# Patient Record
Sex: MALE | Race: WHITE | NOT HISPANIC OR LATINO
[De-identification: names, ages, dates, MRNs, and addresses within clinical notes are randomized per-mention and may not be internally consistent; named-entity substitution may affect disease eponyms.]

---

## 2017-01-05 ENCOUNTER — RX RENEWAL (OUTPATIENT)
Age: 82
End: 2017-01-05

## 2017-02-02 ENCOUNTER — APPOINTMENT (OUTPATIENT)
Dept: CARDIOLOGY | Facility: CLINIC | Age: 82
End: 2017-02-02

## 2017-02-02 ENCOUNTER — NON-APPOINTMENT (OUTPATIENT)
Age: 82
End: 2017-02-02

## 2017-02-02 VITALS — DIASTOLIC BLOOD PRESSURE: 84 MMHG | SYSTOLIC BLOOD PRESSURE: 170 MMHG

## 2017-02-02 VITALS
SYSTOLIC BLOOD PRESSURE: 190 MMHG | DIASTOLIC BLOOD PRESSURE: 80 MMHG | HEART RATE: 55 BPM | WEIGHT: 148 LBS | OXYGEN SATURATION: 95 % | BODY MASS INDEX: 27.23 KG/M2 | HEIGHT: 62 IN

## 2017-02-02 VITALS — SYSTOLIC BLOOD PRESSURE: 180 MMHG | DIASTOLIC BLOOD PRESSURE: 86 MMHG

## 2017-02-02 DIAGNOSIS — R01.1 CARDIAC MURMUR, UNSPECIFIED: ICD-10-CM

## 2017-02-02 DIAGNOSIS — I10 ESSENTIAL (PRIMARY) HYPERTENSION: ICD-10-CM

## 2017-02-02 DIAGNOSIS — E78.5 HYPERLIPIDEMIA, UNSPECIFIED: ICD-10-CM

## 2017-02-02 DIAGNOSIS — I35.0 NONRHEUMATIC AORTIC (VALVE) STENOSIS: ICD-10-CM

## 2017-02-02 DIAGNOSIS — I50.32 CHRONIC DIASTOLIC (CONGESTIVE) HEART FAILURE: ICD-10-CM

## 2017-02-02 DIAGNOSIS — H91.93 UNSPECIFIED HEARING LOSS, BILATERAL: ICD-10-CM

## 2017-02-03 LAB
25(OH)D3 SERPL-MCNC: 20 NG/ML
ALBUMIN SERPL ELPH-MCNC: 4.4 G/DL
ALP BLD-CCNC: 76 U/L
ALT SERPL-CCNC: 13 U/L
ANION GAP SERPL CALC-SCNC: 17 MMOL/L
AST SERPL-CCNC: 21 U/L
BASOPHILS # BLD AUTO: 0.04 K/UL
BASOPHILS NFR BLD AUTO: 0.5 %
BILIRUB SERPL-MCNC: 0.5 MG/DL
BUN SERPL-MCNC: 36 MG/DL
CALCIUM SERPL-MCNC: 10 MG/DL
CHLORIDE SERPL-SCNC: 106 MMOL/L
CHOLEST SERPL-MCNC: 309 MG/DL
CHOLEST/HDLC SERPL: 7.2 RATIO
CO2 SERPL-SCNC: 21 MMOL/L
CREAT SERPL-MCNC: 1.3 MG/DL
CRP SERPL HS-MCNC: 1.2 MG/L
EOSINOPHIL # BLD AUTO: 0.3 K/UL
EOSINOPHIL NFR BLD AUTO: 4 %
GLUCOSE SERPL-MCNC: 130 MG/DL
HBA1C MFR BLD HPLC: 6.6 %
HCT VFR BLD CALC: 43.2 %
HDLC SERPL-MCNC: 43 MG/DL
HGB BLD-MCNC: 13.4 G/DL
IMM GRANULOCYTES NFR BLD AUTO: 0.1 %
LDLC SERPL CALC-MCNC: 210 MG/DL
LYMPHOCYTES # BLD AUTO: 1.64 K/UL
LYMPHOCYTES NFR BLD AUTO: 21.9 %
MAGNESIUM SERPL-MCNC: 2.3 MG/DL
MAN DIFF?: NORMAL
MCHC RBC-ENTMCNC: 28 PG
MCHC RBC-ENTMCNC: 31 GM/DL
MCV RBC AUTO: 90.4 FL
MONOCYTES # BLD AUTO: 0.68 K/UL
MONOCYTES NFR BLD AUTO: 9.1 %
NEUTROPHILS # BLD AUTO: 4.83 K/UL
NEUTROPHILS NFR BLD AUTO: 64.4 %
PLATELET # BLD AUTO: 331 K/UL
POTASSIUM SERPL-SCNC: 4.9 MMOL/L
PROT SERPL-MCNC: 6.8 G/DL
RBC # BLD: 4.78 M/UL
RBC # FLD: 15.9 %
SODIUM SERPL-SCNC: 144 MMOL/L
T3FREE SERPL-MCNC: 2.22 PG/ML
T4 FREE SERPL-MCNC: 1.2 NG/DL
TRIGL SERPL-MCNC: 280 MG/DL
TSH SERPL-ACNC: 1.57 UIU/ML
WBC # FLD AUTO: 7.5 K/UL

## 2017-02-08 ENCOUNTER — APPOINTMENT (OUTPATIENT)
Dept: CARDIOLOGY | Facility: CLINIC | Age: 82
End: 2017-02-08

## 2017-02-23 ENCOUNTER — APPOINTMENT (OUTPATIENT)
Dept: CARDIOLOGY | Facility: CLINIC | Age: 82
End: 2017-02-23

## 2017-02-23 VITALS
HEIGHT: 62 IN | DIASTOLIC BLOOD PRESSURE: 80 MMHG | BODY MASS INDEX: 27.6 KG/M2 | SYSTOLIC BLOOD PRESSURE: 140 MMHG | WEIGHT: 150 LBS

## 2017-04-10 ENCOUNTER — INPATIENT (INPATIENT)
Facility: HOSPITAL | Age: 82
LOS: 9 days | Discharge: NURSING, REHAB OR LONG-TERM CARE | End: 2017-04-20
Attending: INTERNAL MEDICINE | Admitting: INTERNAL MEDICINE
Payer: MEDICARE

## 2017-04-10 VITALS — HEIGHT: 61 IN | WEIGHT: 139.99 LBS

## 2017-04-10 LAB
ALBUMIN SERPL ELPH-MCNC: 3.4 G/DL — SIGNIFICANT CHANGE UP (ref 3.3–5)
ALP SERPL-CCNC: 80 U/L — SIGNIFICANT CHANGE UP (ref 40–120)
ALT FLD-CCNC: 21 U/L — SIGNIFICANT CHANGE UP (ref 12–78)
ANION GAP SERPL CALC-SCNC: 13 MMOL/L — SIGNIFICANT CHANGE UP (ref 5–17)
ANISOCYTOSIS BLD QL: SLIGHT — SIGNIFICANT CHANGE UP
AST SERPL-CCNC: 34 U/L — SIGNIFICANT CHANGE UP (ref 15–37)
BILIRUB SERPL-MCNC: 0.5 MG/DL — SIGNIFICANT CHANGE UP (ref 0.2–1.2)
BUN SERPL-MCNC: 46 MG/DL — HIGH (ref 7–23)
CALCIUM SERPL-MCNC: 8.3 MG/DL — LOW (ref 8.5–10.1)
CHLORIDE SERPL-SCNC: 108 MMOL/L — SIGNIFICANT CHANGE UP (ref 96–108)
CO2 SERPL-SCNC: 23 MMOL/L — SIGNIFICANT CHANGE UP (ref 22–31)
CREAT SERPL-MCNC: 1.39 MG/DL — HIGH (ref 0.5–1.3)
EOSINOPHIL NFR BLD AUTO: 1 % — SIGNIFICANT CHANGE UP (ref 0–6)
GLUCOSE SERPL-MCNC: 122 MG/DL — HIGH (ref 70–99)
HCT VFR BLD CALC: 31.9 % — LOW (ref 39–50)
HGB BLD-MCNC: 10.9 G/DL — LOW (ref 13–17)
LYMPHOCYTES # BLD AUTO: 14 % — SIGNIFICANT CHANGE UP (ref 13–44)
MACROCYTES BLD QL: SLIGHT — SIGNIFICANT CHANGE UP
MCHC RBC-ENTMCNC: 29.8 PG — SIGNIFICANT CHANGE UP (ref 27–34)
MCHC RBC-ENTMCNC: 34.2 GM/DL — SIGNIFICANT CHANGE UP (ref 32–36)
MCV RBC AUTO: 87.1 FL — SIGNIFICANT CHANGE UP (ref 80–100)
MICROCYTES BLD QL: SLIGHT — SIGNIFICANT CHANGE UP
MONOCYTES NFR BLD AUTO: 2 % — SIGNIFICANT CHANGE UP (ref 2–14)
NEUTROPHILS NFR BLD AUTO: 83 % — HIGH (ref 43–77)
OVALOCYTES BLD QL SMEAR: SLIGHT — SIGNIFICANT CHANGE UP
PLAT MORPH BLD: NORMAL — SIGNIFICANT CHANGE UP
PLATELET # BLD AUTO: 237 K/UL — SIGNIFICANT CHANGE UP (ref 150–400)
POTASSIUM SERPL-MCNC: 3.8 MMOL/L — SIGNIFICANT CHANGE UP (ref 3.5–5.3)
POTASSIUM SERPL-SCNC: 3.8 MMOL/L — SIGNIFICANT CHANGE UP (ref 3.5–5.3)
PROT SERPL-MCNC: 6.2 GM/DL — SIGNIFICANT CHANGE UP (ref 6–8.3)
RBC # BLD: 3.66 M/UL — LOW (ref 4.2–5.8)
RBC # FLD: 14.2 % — SIGNIFICANT CHANGE UP (ref 11–15)
RBC BLD AUTO: SIGNIFICANT CHANGE UP
SODIUM SERPL-SCNC: 144 MMOL/L — SIGNIFICANT CHANGE UP (ref 135–145)
TROPONIN I SERPL-MCNC: 0.61 NG/ML — HIGH (ref 0.01–0.04)
WBC # BLD: 6.9 K/UL — SIGNIFICANT CHANGE UP (ref 3.8–10.5)
WBC # FLD AUTO: 6.9 K/UL — SIGNIFICANT CHANGE UP (ref 3.8–10.5)

## 2017-04-10 PROCEDURE — 71010: CPT | Mod: 26

## 2017-04-10 PROCEDURE — 72170 X-RAY EXAM OF PELVIS: CPT | Mod: 26

## 2017-04-10 PROCEDURE — 99285 EMERGENCY DEPT VISIT HI MDM: CPT

## 2017-04-10 RX ORDER — SODIUM CHLORIDE 9 MG/ML
1000 INJECTION INTRAMUSCULAR; INTRAVENOUS; SUBCUTANEOUS ONCE
Qty: 0 | Refills: 0 | Status: COMPLETED | OUTPATIENT
Start: 2017-04-10 | End: 2017-04-10

## 2017-04-10 RX ORDER — HALOPERIDOL DECANOATE 100 MG/ML
5 INJECTION INTRAMUSCULAR ONCE
Qty: 0 | Refills: 0 | Status: COMPLETED | OUTPATIENT
Start: 2017-04-10 | End: 2017-04-10

## 2017-04-10 RX ADMIN — Medication 2 MILLIGRAM(S): at 21:49

## 2017-04-10 RX ADMIN — SODIUM CHLORIDE 250 MILLILITER(S): 9 INJECTION INTRAMUSCULAR; INTRAVENOUS; SUBCUTANEOUS at 22:02

## 2017-04-10 RX ADMIN — HALOPERIDOL DECANOATE 5 MILLIGRAM(S): 100 INJECTION INTRAMUSCULAR at 21:49

## 2017-04-10 NOTE — ED PROVIDER NOTE - PRESENTING SYMPTOMS DETAILS
89M w a hx of dementia, b/l hip surg coems in w worsening agitation. per [place] states past 2-3 days his dementia/confusion has gotten worse to the point he is not making sense and uanble to ambulate well. this evening fell from standing ht in the bathroom, unknown loc but son heard a thump upstairs and saw pt on the floor moving. has mild head contusion, but no active bleeding.   ros - limited due to dementia

## 2017-04-10 NOTE — ED PROVIDER NOTE - PHYSICAL EXAMINATION
GEN: Alert, agitated, no gross signs of pain on examination  HEAD: atraumatic, EOMI, PERRL, normal lids/conjunctiva  ENT: normal hearing, patent oropharynx without erythema/exudate, uvula midline  NECK: +supple, no tenderness/meningismus, +Trachea midline  PULM: Bilateral BS, normal resp effort, no wheeze/stridor/retractions  CV: RRR, no M/R/G, +dist ext pulses  ABD: soft, NT/ND  BACK: no CVAT, no midline tenderness  MSK: no edema/erythema/cyanosis  SKIN: no rash  NEURO: Alert, agitated, no gross motor deficits or cranial nerve deficits

## 2017-04-10 NOTE — ED PROVIDER NOTE - MEDICAL DECISION MAKING DETAILS
pt will be admitted for nstemi, placement, fall risk. cardiology called (dr [facility]), awaiting call back

## 2017-04-10 NOTE — ED ADULT NURSE NOTE - OBJECTIVE STATEMENT
Patient BIBA after Fall. Patient oriented to name only. Mildly combative. Medicated as ordered.  MUNGUIA x 4 without difficultly. No obvious injury. No accompanying family members. Patient on 1:1 for safety and elopement precautions

## 2017-04-10 NOTE — ED PROVIDER NOTE - CARE PLAN
Principal Discharge DX:	NSTEMI (non-ST elevated myocardial infarction)  Secondary Diagnosis:	Agitation

## 2017-04-10 NOTE — ED ADULT NURSE NOTE - CHIEF COMPLAINT QUOTE
fell from standing position , laceration back of his head place one to one with alona for safety, on handcuff, pt very agitated, police removed pt handcuffs

## 2017-04-10 NOTE — ED ADULT TRIAGE NOTE - CHIEF COMPLAINT QUOTE
fell from standing position , laceration back of his head place one to one with alona for safety, on handcuff, pt very agitated fell from standing position , laceration back of his head place one to one with alona for safety, on handcuff, pt very agitated, police removed pt handcuffs

## 2017-04-11 DIAGNOSIS — F03.91 UNSPECIFIED DEMENTIA WITH BEHAVIORAL DISTURBANCE: ICD-10-CM
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
## 2017-04-11 DIAGNOSIS — I21.4 NON-ST ELEVATION (NSTEMI) MYOCARDIAL INFARCTION: ICD-10-CM

## 2017-04-11 LAB
ANION GAP SERPL CALC-SCNC: 12 MMOL/L — SIGNIFICANT CHANGE UP (ref 5–17)
APPEARANCE UR: CLEAR — SIGNIFICANT CHANGE UP
BACTERIA # UR AUTO: ABNORMAL
BASOPHILS # BLD AUTO: 0 K/UL — SIGNIFICANT CHANGE UP (ref 0–0.2)
BASOPHILS NFR BLD AUTO: 0.5 % — SIGNIFICANT CHANGE UP (ref 0–2)
BILIRUB UR-MCNC: NEGATIVE — SIGNIFICANT CHANGE UP
BUN SERPL-MCNC: 39 MG/DL — HIGH (ref 7–23)
CALCIUM SERPL-MCNC: 7.8 MG/DL — LOW (ref 8.5–10.1)
CHLORIDE SERPL-SCNC: 111 MMOL/L — HIGH (ref 96–108)
CO2 SERPL-SCNC: 23 MMOL/L — SIGNIFICANT CHANGE UP (ref 22–31)
COLOR SPEC: YELLOW — SIGNIFICANT CHANGE UP
CREAT SERPL-MCNC: 1.04 MG/DL — SIGNIFICANT CHANGE UP (ref 0.5–1.3)
DIFF PNL FLD: ABNORMAL
EOSINOPHIL # BLD AUTO: 0.1 K/UL — SIGNIFICANT CHANGE UP (ref 0–0.5)
EOSINOPHIL NFR BLD AUTO: 0.9 % — SIGNIFICANT CHANGE UP (ref 0–6)
GLUCOSE SERPL-MCNC: 104 MG/DL — HIGH (ref 70–99)
GLUCOSE UR QL: NEGATIVE MG/DL — SIGNIFICANT CHANGE UP
HCT VFR BLD CALC: 30.2 % — LOW (ref 39–50)
HGB BLD-MCNC: 10.3 G/DL — LOW (ref 13–17)
KETONES UR-MCNC: ABNORMAL
LEUKOCYTE ESTERASE UR-ACNC: NEGATIVE — SIGNIFICANT CHANGE UP
LYMPHOCYTES # BLD AUTO: 1.3 K/UL — SIGNIFICANT CHANGE UP (ref 1–3.3)
LYMPHOCYTES # BLD AUTO: 18.7 % — SIGNIFICANT CHANGE UP (ref 13–44)
MCHC RBC-ENTMCNC: 30.2 PG — SIGNIFICANT CHANGE UP (ref 27–34)
MCHC RBC-ENTMCNC: 34.2 GM/DL — SIGNIFICANT CHANGE UP (ref 32–36)
MCV RBC AUTO: 88.1 FL — SIGNIFICANT CHANGE UP (ref 80–100)
MONOCYTES # BLD AUTO: 0.9 K/UL — SIGNIFICANT CHANGE UP (ref 0–0.9)
MONOCYTES NFR BLD AUTO: 13.2 % — SIGNIFICANT CHANGE UP (ref 2–14)
NEUTROPHILS # BLD AUTO: 4.5 K/UL — SIGNIFICANT CHANGE UP (ref 1.8–7.4)
NEUTROPHILS NFR BLD AUTO: 66.7 % — SIGNIFICANT CHANGE UP (ref 43–77)
NITRITE UR-MCNC: NEGATIVE — SIGNIFICANT CHANGE UP
PH UR: 6 — SIGNIFICANT CHANGE UP (ref 4.8–8)
PLATELET # BLD AUTO: 223 K/UL — SIGNIFICANT CHANGE UP (ref 150–400)
POTASSIUM SERPL-MCNC: 3.7 MMOL/L — SIGNIFICANT CHANGE UP (ref 3.5–5.3)
POTASSIUM SERPL-SCNC: 3.7 MMOL/L — SIGNIFICANT CHANGE UP (ref 3.5–5.3)
PROT UR-MCNC: 15 MG/DL
RBC # BLD: 3.42 M/UL — LOW (ref 4.2–5.8)
RBC # FLD: 14.1 % — SIGNIFICANT CHANGE UP (ref 11–15)
RBC CASTS # UR COMP ASSIST: ABNORMAL /HPF (ref 0–4)
SODIUM SERPL-SCNC: 146 MMOL/L — HIGH (ref 135–145)
SP GR SPEC: 1.01 — SIGNIFICANT CHANGE UP (ref 1.01–1.02)
T3 SERPL-MCNC: 84 NG/DL — SIGNIFICANT CHANGE UP (ref 80–200)
T4 AB SER-ACNC: 8.4 UG/DL — SIGNIFICANT CHANGE UP (ref 4.6–12)
TROPONIN I SERPL-MCNC: 2.1 NG/ML — HIGH (ref 0.01–0.04)
TROPONIN I SERPL-MCNC: 3.17 NG/ML — HIGH (ref 0.01–0.04)
TSH SERPL-MCNC: 0.66 UIU/ML — SIGNIFICANT CHANGE UP (ref 0.36–3.74)
UROBILINOGEN FLD QL: NEGATIVE MG/DL — SIGNIFICANT CHANGE UP
WBC # BLD: 6.8 K/UL — SIGNIFICANT CHANGE UP (ref 3.8–10.5)
WBC # FLD AUTO: 6.8 K/UL — SIGNIFICANT CHANGE UP (ref 3.8–10.5)

## 2017-04-11 PROCEDURE — 70450 CT HEAD/BRAIN W/O DYE: CPT | Mod: 26

## 2017-04-11 PROCEDURE — 99223 1ST HOSP IP/OBS HIGH 75: CPT

## 2017-04-11 PROCEDURE — 72125 CT NECK SPINE W/O DYE: CPT | Mod: 26

## 2017-04-11 PROCEDURE — 99223 1ST HOSP IP/OBS HIGH 75: CPT | Mod: AI

## 2017-04-11 RX ORDER — HYDRALAZINE HCL 50 MG
10 TABLET ORAL ONCE
Qty: 0 | Refills: 0 | Status: COMPLETED | OUTPATIENT
Start: 2017-04-11 | End: 2017-04-11

## 2017-04-11 RX ORDER — SODIUM CHLORIDE 9 MG/ML
1000 INJECTION INTRAMUSCULAR; INTRAVENOUS; SUBCUTANEOUS
Qty: 0 | Refills: 0 | Status: DISCONTINUED | OUTPATIENT
Start: 2017-04-11 | End: 2017-04-12

## 2017-04-11 RX ORDER — HEPARIN SODIUM 5000 [USP'U]/ML
5000 INJECTION INTRAVENOUS; SUBCUTANEOUS EVERY 12 HOURS
Qty: 0 | Refills: 0 | Status: DISCONTINUED | OUTPATIENT
Start: 2017-04-11 | End: 2017-04-20

## 2017-04-11 RX ORDER — ASPIRIN/CALCIUM CARB/MAGNESIUM 324 MG
300 TABLET ORAL ONCE
Qty: 0 | Refills: 0 | Status: COMPLETED | OUTPATIENT
Start: 2017-04-11 | End: 2017-04-11

## 2017-04-11 RX ORDER — ASPIRIN/CALCIUM CARB/MAGNESIUM 324 MG
81 TABLET ORAL DAILY
Qty: 0 | Refills: 0 | Status: DISCONTINUED | OUTPATIENT
Start: 2017-04-11 | End: 2017-04-20

## 2017-04-11 RX ORDER — HALOPERIDOL DECANOATE 100 MG/ML
5 INJECTION INTRAMUSCULAR EVERY 6 HOURS
Qty: 0 | Refills: 0 | Status: DISCONTINUED | OUTPATIENT
Start: 2017-04-11 | End: 2017-04-20

## 2017-04-11 RX ORDER — ASPIRIN/CALCIUM CARB/MAGNESIUM 324 MG
325 TABLET ORAL ONCE
Qty: 0 | Refills: 0 | Status: DISCONTINUED | OUTPATIENT
Start: 2017-04-11 | End: 2017-04-11

## 2017-04-11 RX ORDER — OLANZAPINE 15 MG/1
5 TABLET, FILM COATED ORAL ONCE
Qty: 0 | Refills: 0 | Status: COMPLETED | OUTPATIENT
Start: 2017-04-11 | End: 2017-04-11

## 2017-04-11 RX ORDER — OLANZAPINE 15 MG/1
5 TABLET, FILM COATED ORAL EVERY 12 HOURS
Qty: 0 | Refills: 0 | Status: DISCONTINUED | OUTPATIENT
Start: 2017-04-11 | End: 2017-04-20

## 2017-04-11 RX ADMIN — Medication 2 MILLIGRAM(S): at 09:12

## 2017-04-11 RX ADMIN — Medication 81 MILLIGRAM(S): at 13:20

## 2017-04-11 RX ADMIN — SODIUM CHLORIDE 100 MILLILITER(S): 9 INJECTION INTRAMUSCULAR; INTRAVENOUS; SUBCUTANEOUS at 17:41

## 2017-04-11 RX ADMIN — Medication 2 MILLIGRAM(S): at 00:03

## 2017-04-11 RX ADMIN — HEPARIN SODIUM 5000 UNIT(S): 5000 INJECTION INTRAVENOUS; SUBCUTANEOUS at 06:26

## 2017-04-11 RX ADMIN — Medication 10 MILLIGRAM(S): at 09:15

## 2017-04-11 RX ADMIN — OLANZAPINE 5 MILLIGRAM(S): 15 TABLET, FILM COATED ORAL at 17:40

## 2017-04-11 RX ADMIN — HEPARIN SODIUM 5000 UNIT(S): 5000 INJECTION INTRAVENOUS; SUBCUTANEOUS at 17:40

## 2017-04-11 RX ADMIN — OLANZAPINE 5 MILLIGRAM(S): 15 TABLET, FILM COATED ORAL at 13:17

## 2017-04-11 RX ADMIN — Medication 300 MILLIGRAM(S): at 02:30

## 2017-04-11 NOTE — H&P ADULT. - ASSESSMENT
89 y/o man with advanced dementia, agitation and unsteady gait, s/p probable mechanical fall. Has + troponin, but no acute ST T changes.

## 2017-04-11 NOTE — CONSULT NOTE ADULT - SUBJECTIVE AND OBJECTIVE BOX
Chief Complaint:  Patient is a 89y old  Male who presents with a chief complaint of Agitation with fall. (2017 05:15)      HPI:  89 y/o Man withf dementia, b/l hip surgery brought in for worsening agitation accompanied by son. Son states for past 2-3 days his dementia/confusion has gotten worse to the point he is not making sense and unable to ambulate well. The son heard a thump upstairs and found pt on the floor attempting to get up; he had a mild head contusion, but no active bleeding. No history of seizures. History is limited due to dementia, pt required sedation for agitation in ED. (2017 05:15)    Allergies:        Allergies:  	No Known Allergies:     * Incomplete Medication History as of 10-Apr-2017 22:15 documented in Prescription Writer      Past Medical History:  Dementia.    Past Surgical History:  No significant past surgical history.    Family History:  No pertinent family history in first degree relatives.    Social History:  · Marital Status		  · Lives With	spouse	    Substance Use History:  · Substance Use	never used	    Alcohol Use History:  · Have you ever consumed alcohol	unknown	    Tobacco Usage:  · Tobacco Usage: Unknown if ever smoked	    Review of Systems:  General:  No wt loss, fevers, chills, night sweats  Eyes:  Good vision, no reported pain  ENT:  No sore throat, pain, runny nose, dysphagia  CV:  No pain, palpitation hypo/hypertension  Resp:  No dyspnea, cough, tachypnea, wheezing  GI:  No pain, nausea, vomiting, diarrhea, constipation  :  No pain, bleeding, incontinence, nocturia  Muscle:  No pain, weakness  Breast:  No pain, abscess, mass, discharge  Neuro:  No weakness, tingling, memory problems  Psych:  No fatigue, insomnia, mood problems, depression  Endocrine:  No polyuria, polydypsia, cold/heat intolerance  Heme:  No petechiae, ecchymosis, easy bruisability  Skin:  No rash, edema    Discussed with:  PMD, Family    Physical Exam:  Vital Signs:  Vital Signs Last 24 Hrs  T(C): 37.1, Max: 37.8 (04-10 @ 22:04)  T(F): 98.8, Max: 100 (04-10 @ 22:04)  HR: 78 (62 - 78)  BP: 161/67 (138/71 - 210/104)  RR: 18 (17 - 18)  SpO2: 95% (95% - 98%)  Daily Height in cm: 167.64 (2017 10:20)    Daily Weight in k.3 (2017 10:20)    Tele:   General:  Appears stated age, well-groomed, well-nourished, no distress  HEENT:  NC/AT, patent nares w/ pink mucosa, OP clear w/o lesions, EOMI, conjunctivae clear, no thyromegaly, no JVD  Chest:  Full & symmetric excursion, no increased effort, breath sounds clear  Cardiovascular:  Regular rhythm, S1, S2, no murmur/rub/S3/S4, no carotid bruit, radial/pedal pulses 2+  Abdomen:  Soft, non-tender, non-distended, normoactive bowel sounds  Extremities:  no edema  Skin:  No rash/erythema. Skin is warm/dry  Musculoskeletal:  Full ROM in all joints w/o swelling/tenderness/effusion  Neuro/Psych:  Alert, oriented      Laboratory:                            10.3   6.8   )-----------( 223      ( 2017 05:52 )             30.2     04-11    146<H>  |  111<H>  |  39<H>  ----------------------------<  104<H>  3.7   |  23  |  1.04    Ca    7.8<L>      2017 05:52    TPro  6.2  /  Alb  3.4  /  TBili  0.5  /  DBili  x   /  AST  34  /  ALT  21  /  AlkPhos  80  04-10      CARDIAC MARKERS ( 2017 05:52 )  2.100 ng/mL / x     / x     / x     / x      CARDIAC MARKERS ( 10 Apr 2017 22:11 )  .612 ng/mL / x     / x     / x     / x          LIVER FUNCTIONS - ( 10 Apr 2017 22:11 )  Alb: 3.4 g/dL / Pro: 6.2 gm/dL / ALK PHOS: 80 U/L / ALT: 21 U/L / AST: 34 U/L / GGT: x             Urinalysis Basic - ( 2017 02:45 )    Color: Yellow / Appearance: Clear / S.010 / pH: x  Gluc: x / Ketone: Trace  / Bili: Negative / Urobili: Negative mg/dL   Blood: x / Protein: 15 mg/dL / Nitrite: Negative   Leuk Esterase: Negative / RBC: 3-5 /HPF / WBC x   Sq Epi: x / Non Sq Epi: x / Bacteria: Occasional      Imaging:  ECG:    EXAM:  CHEST SINGLE VIEW                            PROCEDURE DATE:  04/10/2017        INTERPRETATION:  cough    A frontal chest film is severely rotated. Interstitial markings are   prominent    Heart is enlarged. Vascular markings are increased      No mediastinal or hilar adenopathy is suggested. .     The osseous structures appear intact intact.     IMPRESSION:  Severely rotated film. Cardiomegaly with congestive changes.    EXAM:  CT CERVICAL SPINE                          EXAM:  CT BRAIN                        PROCEDURE DATE:  2017    IMPRESSION:     CT BRAIN: No acute intracranial bleeding, mass effect, or shift.     Volume loss and chronic microvascular ischemic change.    CT CERVICAL SPINE: Diffuse osteopenia without displaced fracture.    Grade 2 C7-T1 anterolisthesis due to advanced facet degeneration.    C4-C5 degenerative cord compression and at least moderate canal stenosis   due to disc osteophyte complex and OPLL. If no prior imaging is available   for comparison, consider nonemergent MRI cervical spine to assess for   cord edema or myomalacia. Clinically correlate for myelopathy.    Assessment: 89 y/o Man withf dementia, b/l hip surgery brought in for worsening agitation accompanied by son. Son states for past 2-3 days his dementia/confusion has gotten worse to the point he is not making sense and unable to ambulate well. The son heard a thump upstairs and found pt on the floor attempting to get up; he had a mild head contusion, but no active bleeding. No history of seizures. History is limited due to dementia, pt required sedation for agitation in ED. NSTEMI vs. demand ischemia.    Plan:     Tele monitoring.  Con't with:  aspirin enteric coated 81milliGRAM(s) Oral daily  heparin  Injectable 5000Unit(s) SubCutaneous every 12 hours  sodium chloride 0.9%. 1000milliLiter(s) IV Continuous <Continuous>  OLANZapine 5milliGRAM(s) Oral every 12 hours    Supportive care.  Braxton Lafleur MD, FACC, SRINIVAS, ANA MARIA, FACP  Director, Heart Failure Services  [facility]  , Department of Cardiology  [facility] Chief Complaint:  Patient is a 89y old  Male who presents with a chief complaint of Agitation with fall. (2017 05:15)      HPI:  89 y/o Man withf dementia, b/l hip surgery brought in for worsening agitation accompanied by son. Son states for past 2-3 days his dementia/confusion has gotten worse to the point he is not making sense and unable to ambulate well. The son heard a thump upstairs and found pt on the floor attempting to get up; he had a mild head contusion, but no active bleeding. No history of seizures. History is limited due to dementia, pt required sedation for agitation in ED. (2017 05:15)    Allergies:        Allergies:  	No Known Allergies:     * Incomplete Medication History as of 10-Apr-2017 22:15 documented in Prescription Writer      Past Medical History:  Dementia.    Past Surgical History:  No significant past surgical history.    Family History:  No pertinent family history in first degree relatives.    Social History:  · Marital Status		  · Lives With	spouse	    Substance Use History:  · Substance Use	never used	    Alcohol Use History:  · Have you ever consumed alcohol	unknown	    Tobacco Usage:  · Tobacco Usage: Unknown if ever smoked	    Review of Systems:  General:  No wt loss, fevers, chills, night sweats  Eyes:  Good vision, no reported pain  ENT:  No sore throat, pain, runny nose, dysphagia  CV:  No pain, palpitation hypo/hypertension  Resp:  No dyspnea, cough, tachypnea, wheezing  GI:  No pain, nausea, vomiting, diarrhea, constipation  :  No pain, bleeding, incontinence, nocturia  Muscle:  No pain, weakness  Breast:  No pain, abscess, mass, discharge  Neuro:  No weakness, tingling, memory problems  Psych:  No fatigue, insomnia, mood problems, depression  Endocrine:  No polyuria, polydypsia, cold/heat intolerance  Heme:  No petechiae, ecchymosis, easy bruisability  Skin:  No rash, edema    Discussed with:  PMD, Family    Physical Exam:  Vital Signs:  Vital Signs Last 24 Hrs  T(C): 37.1, Max: 37.8 (04-10 @ 22:04)  T(F): 98.8, Max: 100 (04-10 @ 22:04)  HR: 78 (62 - 78)  BP: 161/67 (138/71 - 210/104)  RR: 18 (17 - 18)  SpO2: 95% (95% - 98%)  Daily Height in cm: 167.64 (2017 10:20)    Daily Weight in k.3 (2017 10:20)    Tele:   General:  Appears stated age, well-groomed, well-nourished, no distress  HEENT:  NC/AT, patent nares w/ pink mucosa, OP clear w/o lesions, EOMI, conjunctivae clear, no thyromegaly, no JVD  Chest:  Full & symmetric excursion, no increased effort, breath sounds clear  Cardiovascular:  Regular rhythm, S1, S2, no murmur/rub/S3/S4, no carotid bruit, radial/pedal pulses 2+  Abdomen:  Soft, non-tender, non-distended, normoactive bowel sounds  Extremities:  no edema  Skin:  No rash/erythema. Skin is warm/dry  Musculoskeletal:  Full ROM in all joints w/o swelling/tenderness/effusion  Neuro/Psych:  Alert, oriented      Laboratory:                            10.3   6.8   )-----------( 223      ( 2017 05:52 )             30.2     04-11    146<H>  |  111<H>  |  39<H>  ----------------------------<  104<H>  3.7   |  23  |  1.04    Ca    7.8<L>      2017 05:52    TPro  6.2  /  Alb  3.4  /  TBili  0.5  /  DBili  x   /  AST  34  /  ALT  21  /  AlkPhos  80  04-10      CARDIAC MARKERS ( 2017 05:52 )  2.100 ng/mL / x     / x     / x     / x      CARDIAC MARKERS ( 10 Apr 2017 22:11 )  .612 ng/mL / x     / x     / x     / x          LIVER FUNCTIONS - ( 10 Apr 2017 22:11 )  Alb: 3.4 g/dL / Pro: 6.2 gm/dL / ALK PHOS: 80 U/L / ALT: 21 U/L / AST: 34 U/L / GGT: x             Urinalysis Basic - ( 2017 02:45 )    Color: Yellow / Appearance: Clear / S.010 / pH: x  Gluc: x / Ketone: Trace  / Bili: Negative / Urobili: Negative mg/dL   Blood: x / Protein: 15 mg/dL / Nitrite: Negative   Leuk Esterase: Negative / RBC: 3-5 /HPF / WBC x   Sq Epi: x / Non Sq Epi: x / Bacteria: Occasional      Imaging:  ECG:    EXAM:  CHEST SINGLE VIEW                            PROCEDURE DATE:  04/10/2017        INTERPRETATION:  cough    A frontal chest film is severely rotated. Interstitial markings are   prominent    Heart is enlarged. Vascular markings are increased      No mediastinal or hilar adenopathy is suggested. .     The osseous structures appear intact intact.     IMPRESSION:  Severely rotated film. Cardiomegaly with congestive changes.    EXAM:  CT CERVICAL SPINE                          EXAM:  CT BRAIN                        PROCEDURE DATE:  2017    IMPRESSION:     CT BRAIN: No acute intracranial bleeding, mass effect, or shift.     Volume loss and chronic microvascular ischemic change.    CT CERVICAL SPINE: Diffuse osteopenia without displaced fracture.    Grade 2 C7-T1 anterolisthesis due to advanced facet degeneration.    C4-C5 degenerative cord compression and at least moderate canal stenosis   due to disc osteophyte complex and OPLL. If no prior imaging is available   for comparison, consider nonemergent MRI cervical spine to assess for   cord edema or myomalacia. Clinically correlate for myelopathy.      Echo from 9-11-15  Conclusions: DllzfAoaybmv18Uno FwnojEpftupf868Nkcju   1. Normal right and left ventricular systolic function. Biatrial enlargement. Grade 1 diastolic dysfunction. HtfbpXhbiwpy141Mcv FqozkEcitlfk632Hfzdc QqvrmLotibtc020Bav EpfsfLmawpzm376Fvyuj   2. Moderate tricuspid insufficiency. Mild mitral and aortic insufficiency. Mild aortic stenosis.   3. No pericardial effusion. NtlwmApbqvyy776Tlh KdcneKgnkpvw044Jwlii KsfluRshvnhf603Dap QgkagTwryshu275Twyev QomxkBbgakxa777Bpp WlvjaSqezqnd984Ecnma MqotzHjegxsm865Xqh FrbdeDiluhly923Wwmyw 4. As compared to previous study dated 14, no significant interval changes are seen. XdodvPbaekva184Gao Fokuqbibnixmtnnx3t0i8t-7c5r-6w8e-7l2e-gw4p64xq4k3lHzjkTdj      Assessment: 89 y/o Man withf dementia, b/l hip surgery brought in for worsening agitation accompanied by son. Son states for past 2-3 days his dementia/confusion has gotten worse to the point he is not making sense and unable to ambulate well. The son heard a thump upstairs and found pt on the floor attempting to get up; he had a mild head contusion, but no active bleeding. No history of seizures. History is limited due to dementia, pt required sedation for agitation in ED. NSTEMI vs. demand ischemia.    Plan:     Tele monitoring.  Con't with:  aspirin enteric coated 81milliGRAM(s) Oral daily  heparin  Injectable 5000Unit(s) SubCutaneous every 12 hours  sodium chloride 0.9%. 1000milliLiter(s) IV Continuous <Continuous>  OLANZapine 5milliGRAM(s) Oral every 12 hours    Supportive care.  Braxton Lafleur MD, FACC, FASCONSTANTINE, ANA MARIA, FACP  Director, Heart Failure Services  [facility]  , Department of Cardiology  [facility]    Last office Visit Note from 17:    Reason For Visit  McxrvedbztQOS8pzt4gz3-9mwn-23v8-w078-13x94o41lc58EqggAbcvs XbgktMlrdiou2Mbmel MIGUEL ACSIANO is being seen for a clinic follow-up of coronary artery disease and Heart murmur. JtspaZgkwrzv4Mgu QetdeWrrmgzg6Nkgnu XxpnhGygokzm2Bol UmqwTybaeka04Bpcdf KocaTnhakgk32Xlj HgjxogPxwhizc60Spdql XvfdacLiqsvjk12Niz IpelfhwkbrETY9iiv1dd4-0yhu-05n8-t598-15v37y44db78DipeAob        ReasonForVisitSectionEnd  HistoryofPresentIllnessSectionStart History of Present Illness  OkcpqytojXTEvq782770-z99e-7kle-cnjw-118223h0e104VgyvPfmgt FowzOcxnGxwka1Vodlk 89-year-old male followed for history of coronary artery disease, Also followed for history of diastolic heart failure, hypertension, hyperlipidemia and aortic stenosis.   Miguel has become quite hard of hearing and it was difficult to get an adequate history. He claims he is compliant with his medications however he can't recall which medications he is taking or how many. He denies any recent chest pain or shortness of breath. His blood pressures, which have been somewhat poorly controlled in the past were extremely high today.No recent labs have been drawn. TrgsGklpFpuzf8Tnl YasqocqvlHHBsu266872-r94u-8tdv-lgpv-970300j4f321WnpoOpc        HistoryofPresentIllnessSectionEnd  CardiologySummarySectionStart Cardiology Summary  YsulevpcsqGgmufrhs239d8qc-uc11-729t-n544-d8o425z28074BnywUnzrf XfpazWjajous7Kuoyq   EK17, Sinus Bradycardia -First degree A-V block Voltage criteria for LVH (R(I)+S(III) exceeds 2.50 mV) -Voltage criteria w/o ST/T abnormality may be normal.  -consider old anterior infarct.  -Nonspecific ST depression + Nonspecific T-abnormality TnislCveiyvx1Zhd UmxeeOfeowhn91Ukakd   Stress Test: 4/10, no Ischemia, no exercise induced arrhythmias, no Symptoms LvfthRpioiqt18Tur VhcuoRmauyjv46Bamew   Echo: 14, Mild AS, AI, MR. Moderate TR. Mild pulmonary hypertension. LVEF 50%. 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 PslbzMbrgbak8Lei VtjsqKkgokmm50Efnlj GkpsbXgjkedy52Cmi FlwjsYeguoji91Zlnkz SaxwnGfkjhis08Xpv IzymkWtyllpv66Biwlu IrgfvTywhwmi70Aiv ZslmwHnshhoy36Woykk QscvbVrwdqhf78Ixs JutanSoqmjkx32Sigsn QmhwhEemkugz80Esg YpsqrFxdrynr98Vejlk FnbgnZhisjsn65Uhc KnzqpRujfyfq32Vyfbl GdkkfHfidbxm75Foq HooxxCdndqll06Viwti FzluwMcecmub73Sfd QmlxmYcenfhk08Pbnli RcabwOsqccpg06Vot KooioYuzalyk00Gayny PbumwVgsnoeu32Ymo HatjzYtifkbj83Hsgnt JrlhtFzsnqaj28Ure JovcfykzvsGdyflfuz003q3go-kv42-701k-a015-s3s213b85407TbcuGqy        CardiologySummarySectionEnd  ActiveProblemsSectionStart Active Problems  ActiveProblems_20_twCiteListControlStart Aortic stenosis, moderate (424.1) (I35.0)  Bilateral hearing loss (389.9) (H91.93)  Chronic diastolic heart failure (428.32) (I50.32)  HTN (hypertension) (401.9) (I10)  Hyperlipidemia (272.4) (E78.5)  Systolic murmur (785.2) (R01.1)  ActiveProblems_20_twCiteListControlEnd     ActiveProblemsSectionEnd  PastMedicalHistorySectionStart Past Medical History  PastMedicalHistory_20_twCiteListControlStart History of Elevated cholesterol (272.0) (E78.00)  History of H/O cardiac catheterization (V45.89) (Z98.890)  History of H/O total hip arthroplasty (V43.64) (Z96.649)  History of angina pectoris (V12.59) (Z86.79)  History of sinus bradycardia (V12.59) (Z86.79)  History of Osteoarthritis, hip, bilateral (715.95) (M16.0)  PastMedicalHistory_20_twCiteListControlEnd     PastMedicalHistorySectionEnd  SurgicalHistorySectionStart Surgical History  SurgicalHistory_20_twCiteListControlStart History of Total Hip Replacement  SurgicalHistory_20_twCiteListControlEnd     SurgicalHistorySectionEnd  FamilyHistorySectionStart Family History  FamilyHistory_20_twCiteListControlStart Family history of Hearing loss, unspecified laterality : Father  No pertinent family history : Mother  FamilyHistory_20_twCiteListControlEnd     FamilyHistorySectionEnd  SocialHistorySectionStart Social History  SocialHistory_20_twCiteListControlStart Former smoker (V15.82) (Z87.891)    Never smoker  Non-smoker (V49.89) (Z78.9)  Social alcohol use (Z78.9)  SocialHistory_20_twCiteListControlEnd     SocialHistorySectionEnd  CurrentMedsSectionStart Current Meds  CurrentMeds_4_twCiteListControlStart Aspirin 325 MG Oral Tablet  Crestor 40 MG Oral Tablet; TAKE 1 TABLET BY MOUTH EVERY DAY  Fenofibric Acid 135 MG Oral Capsule Delayed Release; TAKE 1 CAPSULE BY MOUTH  DAILY  Fenofibric Acid CPDR  Isosorbide Mononitrate ER 60 MG Oral Tablet Extended Release 24 Hour; TAKE 1  TABLET BY MOUTH EVERY DAY  CurrentMeds_4_twCiteListControlEnd     CurrentMedsSectionEnd  AllergiesSectionStart Allergies  Allergies_20_twCiteListControlStart No Known Drug Allergies  Allergies_20_twCiteListControlEnd     AllergiesSectionEnd  HistoryReviewedSectionStart History Reviewed  QyrljnaChxrioke86003414-12i5-51y6-7564-jfxr0dvesc59GggoRpjlg VyofWfclqhh6Jhvnx History reviewed. IfinWtkhnkb6Qal KnunNngnvgc4Vowxf Medications and allergies reviewed. WwqzMesrnmi2Rqu QdqjnupFnrriskw47201829-81h6-98y8-0137-vapi1mweuc25BjunQft        HistoryReviewedSectionEnd  ReviewofSystemsSectionStart Review of Systems  Demuvoej7502xt4p-50f6-343f-so05-tj5ld15g3t1hLmsdWpinf GlikmYmlkmlb9Tbqtj ZrijuQrpevuf8Dvj BlaeyFnexgqj3Gmakf XbfhgLifjavb4Nlc ZpnjfFaewsfj4Krajj OuxkoRqyxluz6Sfj TnsyrBqvtdzp2Fbiqa QzhccSmnymzc7Mfm JcvsnJmtqdqs54Qcwjf VzrrnPtubkfw53Ehr DbptjWbzlfty72Hptfz MxhuyAmxbmgk99Efg DfxunIgpgpfh99Baebq JghnnMdynhkp93Byw RbzbsBkegbey95Deygd YakalVxwzvgk17Bmx UhieuMiztirs76Twxhb ZmfbzIbdtwuq21Pzm WvjuvPyflecp8Llctb WqbosUeaignj6Rpx SnpqjTqayelx7Tyzvm XevooIqyblud7Ynz OzoqaHrivape5Tpryw SvbtmWsnrohq6Sid DcppnKvlqknl7Bymuo MzkjbNeznzdf7Toh XwxefEaeombj98Sdqqw SgtsoFndvhvn68Nfa ExesdTxypsas33Zxldw   Constitutional, Eyes, ENT, Cardiovascular, Respiratory, Gastrointestinal, Genitourinary, Musculoskeletal, Integumentary, Neurological, Psychiatric, Endocrine and Heme/Lymph are otherwise negative. PownkCedagrq93Qnd Mvktplyk2598hh3m-79h8-990y-uo57-ud0fg08n0q2tTsghHou        ReviewofSystemsSectionEnd   VitalsSectionStart Vitals  Vitals_110_twCiteListControlStart Vital Signs    ** Printed in Appendix #1 below.   Vitals_110_twCiteListControlEnd     VitalsSectionEnd  PhysicalExamSectionStart Physical Exam  Constitutional(Brief)WFXUS0d1r854c-7o12-623m-82w2-3sp40xjm2y68EcvhOypng VStart Constitutional: well developed, normal appearance, well groomed, well nourished, no deformities and no acute distress. VEnd ChrghGihnrbb6Sdsph HmsdnUqtwbuz2Jlm DgnrlXarhlrb3Rnnll ZmlaoUjwesrs0Jra PpxhcYwotkhw4Vbqhw BnatjDtybhaj4Weh LsigjUrlcfqo7Jhvwd VmatrPmkaqvq2Hzn Constitutional(Brief)EEHON7m8z238q-7e55-382x-79g0-1up15olc8v36IbctFky Eye(Brief)VDTSQ50451203-931d-8620-a909-527m50l5ql1hFdgjOuvyd   VStart Eyes: the conjunctiva exhibited no abnormalities and the eyelids demonstrated no xanthelasmas. VEnd RmhdvDiqprmh3Nwqpe KowqpOpjsjxr9Web Eye(Brief)EQRHC45070576-083z-4126-o961-181d35x6ll8nGdvkIjs HEENT(Brief)KPKUD286k1n8d-36ea-759r-j977-hz6v95719050IjbsHtxlz   PcpcdTgqytkq2Pfimu HEENT: normal oral mucosa, no oral pallor and no oral cyanosis. XiftxXxghtgm2Phb WqkhcCimtvff7Izplg PpnnvGwvjpnq8Ilj BwqjBcdlNvoiu6Hokzg ZaglOidqNysuz9Spk HEENT(Brief)NWTIE630y5l6f-27ko-309o-a839-xz4d81605979SqgiThx Neck(Brief)FBMFY892rx247-w06s-7ftd-g1q6-wi991333523kPlvvPdfvx   OocbcWgqtxms13Dwmva Neck: normal jugular venous A waves present, normal jugular venous V waves present and no jugular venous lema A waves. UejdoPdfhacz33Tvp KuvesTmiexpq1Jvpzf UktujOzslopa8Ulg Neck(Brief)BAVEK114lh904-z05d-3bpr-q3n6-cv528604316rPyymDek Pulmonary(Brief)RJMQQdcj003n3-8pbt-39d0-95k9-v6j4893fpvhtXvboIoxmd   FqqhjDlbuteu04Juhxg Pulmonary: no respiratory distress, normal respiratory rhythm and effort, no accessory muscle use and lungs were clear to auscultation bilaterally. ErmngYgrdaay85Umz EmhizWcxvdml40Tphym SemdzTvfhugb37Pbu UcxbrEydwghd79Cijub EopfaOthadjx43Gtf AaexyNkbagjc69Qxqxj AcwxlHrgbabv61Fgb HtworBtvmffk81Infir RtmkhVpzxwjt72Qfe QubtcFobpanr03Yhskd WhxfyFekgedk87Wms RujosErapatn68Bmyea QvwwfMbfzbwu78Zue FvlzyBmdsoqy3Hwfqv RkgkiBazxxdq3Hqo Pulmonary(Brief)HFTXGuhf609p8-0gqv-23r0-42m6-n5a2873ibywqUsawBmq Abdomen(Brief)BTFPI07ht55h6-96y4-1ak6-47z9-upgj49f27w5cKsmnNtuay   VStart Abdomen: soft, non-tender, no hepato-splenomegaly and no abdominal mass palpated. VEnd ZeiglMczqfqn1Smgic PnmeaRdbfwbs8Cmo JsvobFdovqop5Mczst FuyzaPwgzprd6Yzz ThbxbSzltfrx6Tvnqz ShawaVrdjfbi7Nsq QkshfHmgacvx9Dljhi LjvmfGgmtycy3Ook EjejhBnittbt3Nivmm JtixaIrderej8Cja DpxvlVveezmw2Lzjpy VkaorEsjlmmy8Yzk ExtrkEvizqbb6Pfseo PdzljPgavbpj9Uhl Abdomen(Brief)WYXSE98pf25z9-23p4-3vr5-75o5-avis45h61v8aAvgsTzi Musculoskeletal(Brief)WHNMM791a7390-7bdy-5327-j9g1-h243j680ecl2HpwqEsftg   GwklsCtxjoux2Tqces Musculoskeletal: normal gait . the gait was sufficient for exercise testing. IgzxvQsisjgr1Zsq ZpsiqSvdvzqm8Kbvqu WdbseWsebpli2Uiq BiwquAmozfwn8Mrbkq RascfGyescww4Vqn Musculoskeletal(Brief)TQPKC557b6755-8gdk-1961-n6c7-u628n284sqf7MwfkUym Extremities(Brief)XAJUT2042s1og-8q9o-8zn8-7580-22892u954623MomtHumas   QyhtrPxszgfh3Hmhpp Extremities: no clubbing of the fingernails, no localized cyanosis, no petechial hemorrhages and no ischemic changes. KupeaYndvaet1Scg NwoidUgqrwsc6Kzqsy NgzzvKnevxqh9Tpw PthjnEcidvqj9Eedtl NvsyoJwgjkre7Bxr XstxeInbopdw8Ctlbk ArjqdLbwsdqn0Krs AcqhwNkvwjzb3Qpucb IxpnnBcdpkev9Evz NovgyAfdzvmw7Ekjkg UblvjJnfeubx5Ovb DlpweZpqmivl1Bbbgr DheilSsjsfkt5Jyk FtyerLkaowkw9Gsewn TyfmbDliragy5Tmp Extremities(Brief)UJLOT6720r2lq-2b6z-4lo9-6400-13577c211928MmqzAye Skin(Brief)QAYWW0fa86nm5-7z5n-236u-g1ks-3mnrwz31y8icHlhiMzxjt   LqqtnWfoinyo9Tutna Skin: normal skin color and pigmentation, no rash, no venous stasis, no skin lesions, no skin ulcer and no xanthoma was observed. AwkhnJhjnbbz5Flz TyfhqSgufiqy7Jukop JnpehNanadvs4Uwl ZudtqNvjzgbd1Ythnl DtfktUzvmquh8Xmu JmjngWrvvrqj3Bgajz DejukKqdxazu4Rfp BbhaaPzmnjch6Ojlde VmwpjOfxjfty9Ffb ObhshTawopfy6Eukjp WivcyTcijrqt6Qyl Skin(Brief)BSTKR4ge52ds4-3h6r-147q-g7jo-4aadsn43b8ofUdxqVmg Psychiatric(Brief)[id number]   UaxaqQwavzxf8Fudru Psychiatric: oriented to person, place, and time, the affect was normal, the mood was normal and not feeling anxious. VdztuSzvfisw6Xeu FljwiYmdcwzj9Eluad RqdrfRvdhwpf5Unf BfqsfTmnhwsk5Hfyzi VgrteEaukrlp6Iyt XcqccBalekzp1Yctcm MysppKuaemvw9Pvl YnhavPnanyyt6Ifnmg CajcfHbvibps5Ojx GatpiYyamhwo7Nsqtm DwmepArhhvuj3Idn PwnkpNgeqcsy7Ajsun HqohjGnvhlep4Kba UywpmMnwcrsu16Esdcn LiphlSlfowdo90Fsg Psychiatric(Brief)[id number] EtqcfqwVNHBPGswoury84xck-w9d4-525a-602k-4v939a5v85p4PzvgUudmq   SszluHdveedl17Bbpnk Cardiovascular: SriigNgqryui46Zdb KppurNkoabim00Xeigl PswnaJoamlbn89Nrh AqibtUsukzth39Wzvno The PMI was palpated at the 5th LICS in the midclavicular line. MqvnrWhidfbu88Olr UekjqBhatdkn8Qcvxb The apical impulse was dyskinetic. XybmsYkuoudm0Svx AmaddKnyapkz34Mwhfj a precordial heave was noted at the left lower parasternal line. EfsksVyxrkix79Gmu HzasiEruqfxo2Ennkk DodigOlltkdq0Fyc MprizKidhndi2Uuzrd The heart rate was bradycardic, 52 bpm. UifvmGadtbtt1Pvd QzwvgSpoxsos40Hetza The rhythm was regular. SpoyaFbxdmzp27Yul MxytqDlnzixa4Ejqll Heart sounds: abnormal S1, normal S2. QpbqvSdqfuez8Ojz [id number] [id number] GdwpcWkezxqr47Fbili SexxsIunapin73Hav EmuqwHqxraet2Kqkjc RkhwhQphhnav4Aen CtjjdZzaqyuz40Ajofr OebozJatecov95Vxj UmdlkMsxbuvd75Vwllt CerneJcgpuas51Ouk UswvcNddhgbg78Qvzut EoosoTaymmmy19Utg VzepsIybmpxc9Klefk JifnkPmueblq6Sha UteyfEqalsiv38Ipito LjvnnMzkbfkp18Zos XefnjMoxvwpq92Yfdxc   A grade 2 systolic murmur was heard at the [facility]. TkbjjOisytje72Spd UrkzgLeajtni98Erguq JaddjClszzkz44Vsu QlltdEruesbp11Gdedq VowbbYxjghrm85Axi ZwpzuAyozvfh94Xhjpi CzhwuEnwydzj67Uco IpmqqWhrrrme05Plgzl SuwkrPbjgsrr51Pgp AewelMydoqjw08Fdnog UpnveSmaycav58Lcc EvfiyOvpyoag88Pqlsw GgjmuHmhogxc17Rua FyhwgHolxcdy42Vbdyr HonmaBchvdfz22Kwx JivmbHgzobuh33Zomat XayclWnnvuar06Xzj BbjdoMljlthn77Wesmo OhkylOvjmnae91Qnq JtbrfHntmrav60Qjvew IvavdPhmtvxp92Xma FwqhxNphzayz96Ovbed NoujmSkbudlc76Svi AgdygSnwuako53Hjokl YqaazZbwkjhg43Oxl NeabtZuuwxvh09Wijfa SmwvzZlbxjny97Fkj FzrsoCbslgcm38Vnnbe NykzlXdoyivh49Lhf DpfttQhcezfl27Mqkjv MzucmBehfkpm93Zwd RrxkaOjfrhai10Pamla PhltyRjolpop31Zen CdkvcVpajlon89Munng   Pulses: no bruit heard over the right carotid and no bruit heard over the left carotid. MvslnDrkntvt73Iuk ArborOqcibng18Aiuws PtsstCgbwuxz81Kxf DjgolVlmzizr95Cmiku ThqxjGlqsvgo82Aub KdmhrOiljhdj76Dtnsg NckrhMfohegh72Urc NmiddFykirjs29Sbyqr MyazkOltwisr95Uba AverqXiswtym97Ckbjt OhjphUozqbjo05Vfx UwgloGyxbxwe50Yzhtx Femoral: right 2+ and left 2+. SxpsvMepkjpf89Vaw NnufnEhjcrqz07Ftgje IfyifBtrclro81Vuh UzlqiSrbsoxf50Ykuma GdfcgJgyxkez76Mrc ElgqeEytxfog83Eviqn EzhpkQnhislw09Hkt UfkfyFutzjwu34Lqsif FmmrmOjrucgx03Uux PjtrsTlxioqb67Qqjay SerkyZgliawu78Ior FudqnYqjjfms77Kexfo Dorsalis pedis: right 2+ and left 2+. MbpbwAtfjzkd96Phh GoarqAjdaddf30Qandv SdzicDjhgham24Llz EvthrZwgzlrp28Hlltg   Abdominal Aorta: the abdominal aorta was not enlarged and no bruit was heard, no bruit heard. JcscpWwouyca61Yfv SwmywVvkcydi82Rwjul   Lower Extremities: no varicose veins of the right leg and no varicose veins of the left leg. QhixsVqroxag08Smm EgtbhZzodbky94Jggxf QubhjDdmtoza82Uvj PobnmYxqrkda25Onmkp PtwvbPslacgf25Uki BdiftPgmoinq55Akeel VyglzQemebks93Bxw CezznHcmdirc25Lefir OgptbBqryena92Scq JuhceTifybpd52Uioqr QnkrkNbweali31Dbl PxotnHociecy50Begcp DbaolZlmajzx08Xhh VoojoKmiqbqa05Iiatv HdztfCryjeyi82Jgl ZnvcambKCBOYEqatqup58mvl-s7p6-619i-719o-6t506l6j77s8TwdnLmt        PhysicalExamSectionEnd  AssessmentSectionStart Assessment  Assessment_80_twCiteListControlStart Aortic stenosis, moderate (424.1) (I35.0)  Bilateral hearing loss (389.9) (H91.93)  Chronic diastolic heart failure (428.32) (I50.32)  HTN (hypertension) (401.9) (I10)  Hyperlipidemia (272.4) (E78.5)  Systolic murmur (785.2) (R01.1)  Assessment_80_twCiteListControlEnd     AssessmentSectionEnd  PlanSectionStart Plan  Plan_2_twCiteListControlStart Aortic stenosis, moderate, Chronic diastolic heart failure, Hyperlipidemia   Complete Blood Count w DIFF; Status:Active; Requested for:[id number];   Comprehensive Metabolic Panel; Status:Active; Requested for:2017;   C-Reactive Protein Cardiac; Status:Active; Requested for:2017;   Free T3; Status:Active; Requested for:2017;   Free T4; Status:Active; Requested for:2017;   Hemoglobin A1C, Whole Blood; Status:Active; Requested for:2017;   Lipid Profile; Status:Active; Requested for:2017;   Magnesium, Serum; Status:Active; Requested for:2017;   Thyroid Stimulating Hormone, Serum; Status:Active; Requested for:2017;   Vitamin D, 25-Hydroxy; Status:Active; Requested for:2017;   HTN (hypertension)   Echocardiogram; Status:Hold For - Scheduling; Requested for:2017;   Follow Up___ Outpatient  .with nurse practitioner  Status: Hold For - Scheduling   Requested for: 2017  Plan_2_twCiteListControlEnd     PlanSectionEnd  Discussion/SummarySectionStart Discussion/Summary  CardiologyD/[id number]-8634-49x9j10u04u9HuefOyxfr AhbsfFzxmwte8Ltawa EcvgcWdoanjh1Jfp VxbgpRsxbgli05Ptwsf BsiquBjenfhi32Qxy JhvlsWoyrkbu3Kenac ZyumuFvaxvfr5Ouz InkuxNdselyn39Kmsnr BzgdmFxseczz90Wfm CnpdiPxoytup1Kffoj MpdukIjawwfd9Zgw PwfjzDxpyzuh1Lmlgy OfgvmXmodplp6Nuj GbnbcLxanfuf8Cdmvn CbcdxFixqmiv0Rnw PkmsgJlaopif6Ujyer IoknhFzwvlbv8Cxt MpzjoFmxszib5Ztrdd DkdxaQxzoeje0Tho TlepbLoyamvg14Biyjp   Coronary Artery Disease Discussion/Summary:   Impression: coronary artery disease. Currently, this condition is stable. Medication Changes: none. ZnwnaFowizrn53Qle JycueOpbgpms8Jzcyl VxrfcCwsyytc1Oso DaynmMcdhgkv98Saspb JrtqgSsfsrmu98Opz XsvrpGptaepw3Sgoay   Hyperlipidemia Discussion/Summary:   The impression is hyperlipidemia. Currently, this condition is stable. The diagnostic plan includes a fasting lipid profile. VivuwUwdhmvd0Vax HefmgMeucctq49Diuaw   Hypertension Discussion/Summary: The impression is hypertension. Currently, the condition is stable. Medication Changes: none. NdakzEvyifwc12Lem GfzdgYbwbdql73Mygem YffccXvqdztm75Zwt JbitwTpgyysa89Qtqyh SmkigTdlwusf36Lcr PnvvyXdvcysp71Hhnno MocmuKuvxzcj96Vvw QdtbxXausihr66Lqgfb   Pulmonary Hypertension Discussion/Summary: The impression is pulmonary arterial hypertension. Currently, the condition is stable. Medication Changes: none. DckfgDjdxqkn01Cgp UtdmvRpipkjn72Gqosu AmidwVjwlkjo80Gwv HwzqbHzptssx83Esecq YmybhVmyblij37Iem WovglLzeelfv03Vippk VqivvWntzanr44Tve LwtxgZphrntz07Pnjox NxlbfJdvcyzs41Gfq GwxcoVlseqhb36Hjwva DuufdGdwpvcp95Ymu SoopQzvtKiovn9Arzbj YphuXbxmMoknh8Fna VgedtEokffse89Meeqv   The treatment plan was discussed with for 20 minutes. AdgraWqzfrqd98Hhk SdgdKhguEgsyv9Klkvs OzngKlfiUaktc9Bnk ArtofKjmsevo71Omavm   Patient is instructed to follow up in 1 month(s) with me. FsbnmZfaykbz97Dsx CardiologyD/[id number]-9d2p-5521-09q5p52u24g5LefzKpu Qwilbcwfzu8059khw-17em-2749-802r-79i6o3e0w0yaOidzHppaj   WpqxzFdlexdk2Cqzyn Poorly controlled hypertension, patient denies any blood pressure issues. Will review his meds and repeat low pressures early next week along with an echocardiogram to reevaluate his aortic stenosis. Labs were drawn today. AofufPtsoepb5Qjg VfylmYdhkiwa7Apcrv Chief Complaint:  Patient is a 89y old  Male who presents with a chief complaint of Agitation with fall. (2017 05:15)      HPI:  89 y/o Man withf dementia, b/l hip surgery brought in for worsening agitation accompanied by son. Son states for past 2-3 days his dementia/confusion has gotten worse to the point he is not making sense and unable to ambulate well. The son heard a thump upstairs and found pt on the floor attempting to get up; he had a mild head contusion, but no active bleeding. No history of seizures. History is limited due to dementia, pt required sedation for agitation in ED. (2017 05:15)    Allergies:        Allergies:  	No Known Allergies:     * Incomplete Medication History as of 10-Apr-2017 22:15 documented in Prescription Writer      Past Medical History:  Dementia.    Past Surgical History:  No significant past surgical history.    Family History:  No pertinent family history in first degree relatives.    Social History:  · Marital Status		  · Lives With	spouse	    Substance Use History:  · Substance Use	never used	    Alcohol Use History:  · Have you ever consumed alcohol	unknown	    Tobacco Usage:  · Tobacco Usage: Unknown if ever smoked	    Review of Systems:  General:  No wt loss, fevers, chills, night sweats  Eyes:  Good vision, no reported pain  ENT:  No sore throat, pain, runny nose, dysphagia  CV:  No pain, palpitation hypo/hypertension  Resp:  No dyspnea, cough, tachypnea, wheezing  GI:  No pain, nausea, vomiting, diarrhea, constipation  :  No pain, bleeding, incontinence, nocturia  Muscle:  No pain, weakness  Breast:  No pain, abscess, mass, discharge  Neuro:  No weakness, tingling, memory problems  Psych:  No fatigue, insomnia, mood problems, depression  Endocrine:  No polyuria, polydypsia, cold/heat intolerance  Heme:  No petechiae, ecchymosis, easy bruisability  Skin:  No rash, edema    Discussed with:  PMD, Family    Physical Exam:  Vital Signs:  Vital Signs Last 24 Hrs  T(C): 37.1, Max: 37.8 (04-10 @ 22:04)  T(F): 98.8, Max: 100 (04-10 @ 22:04)  HR: 78 (62 - 78)  BP: 161/67 (138/71 - 210/104)  RR: 18 (17 - 18)  SpO2: 95% (95% - 98%)  Daily Height in cm: 167.64 (2017 10:20)    Daily Weight in k.3 (2017 10:20)    Tele:   General:  Appears stated age, well-groomed, well-nourished, no distress  HEENT:  NC/AT, patent nares w/ pink mucosa, OP clear w/o lesions, EOMI, conjunctivae clear, no thyromegaly, no JVD  Chest:  Full & symmetric excursion, no increased effort, breath sounds clear  Cardiovascular:  Regular rhythm, S1, S2, no murmur/rub/S3/S4, no carotid bruit, radial/pedal pulses 2+  Abdomen:  Soft, non-tender, non-distended, normoactive bowel sounds  Extremities:  no edema  Skin:  No rash/erythema. Skin is warm/dry  Musculoskeletal:  Full ROM in all joints w/o swelling/tenderness/effusion  Neuro/Psych:  Alert, oriented      Laboratory:                            10.3   6.8   )-----------( 223      ( 2017 05:52 )             30.2     04-11    146<H>  |  111<H>  |  39<H>  ----------------------------<  104<H>  3.7   |  23  |  1.04    Ca    7.8<L>      2017 05:52    TPro  6.2  /  Alb  3.4  /  TBili  0.5  /  DBili  x   /  AST  34  /  ALT  21  /  AlkPhos  80  04-10      CARDIAC MARKERS ( 2017 05:52 )  2.100 ng/mL / x     / x     / x     / x      CARDIAC MARKERS ( 10 Apr 2017 22:11 )  .612 ng/mL / x     / x     / x     / x          LIVER FUNCTIONS - ( 10 Apr 2017 22:11 )  Alb: 3.4 g/dL / Pro: 6.2 gm/dL / ALK PHOS: 80 U/L / ALT: 21 U/L / AST: 34 U/L / GGT: x             Urinalysis Basic - ( 2017 02:45 )    Color: Yellow / Appearance: Clear / S.010 / pH: x  Gluc: x / Ketone: Trace  / Bili: Negative / Urobili: Negative mg/dL   Blood: x / Protein: 15 mg/dL / Nitrite: Negative   Leuk Esterase: Negative / RBC: 3-5 /HPF / WBC x   Sq Epi: x / Non Sq Epi: x / Bacteria: Occasional      Imaging:  ECG:    EXAM:  CHEST SINGLE VIEW                            PROCEDURE DATE:  04/10/2017        INTERPRETATION:  cough    A frontal chest film is severely rotated. Interstitial markings are   prominent    Heart is enlarged. Vascular markings are increased      No mediastinal or hilar adenopathy is suggested. .     The osseous structures appear intact intact.     IMPRESSION:  Severely rotated film. Cardiomegaly with congestive changes.    EXAM:  CT CERVICAL SPINE                          EXAM:  CT BRAIN                        PROCEDURE DATE:  2017    IMPRESSION:     CT BRAIN: No acute intracranial bleeding, mass effect, or shift.     Volume loss and chronic microvascular ischemic change.    CT CERVICAL SPINE: Diffuse osteopenia without displaced fracture.    Grade 2 C7-T1 anterolisthesis due to advanced facet degeneration.    C4-C5 degenerative cord compression and at least moderate canal stenosis   due to disc osteophyte complex and OPLL. If no prior imaging is available   for comparison, consider nonemergent MRI cervical spine to assess for   cord edema or myomalacia. Clinically correlate for myelopathy.      Echo from 9-11-15  Conclusions: IraxyOrjemun05Gcy KjhvhBfwludr666Cxtmo   1. Normal right and left ventricular systolic function. Biatrial enlargement. Grade 1 diastolic dysfunction. UsaaeEvrkqbe038Cww AmrwzHjgpjwp753Nbpiq YoxpnYbdgklw727Tbi QuudiSbldexo410Lsenl   2. Moderate tricuspid insufficiency. Mild mitral and aortic insufficiency. Mild aortic stenosis.   3. No pericardial effusion. IenwwYmtjcfi080Piy GcquaLytkeij996Ueely JagzuMbvuzuz080Ypi RdkfmUsmnyvf930Wsugf XsvgoGitrivr829Rhy QlmsxMrznrlx453Dbpka KfawkOwjkqqc052Njh FtuzwFqvytlr189Cxvla 4. As compared to previous study dated 14, no significant interval changes are seen. IwicfVribats061Obi Vbhnpgjmdjakuoan0b0f6d-3d3u-4w2h-8m5k-qr6c70nd6y5iJkezSxq      Echo from 2017  EF 60-65  Mild / moderate AS with mild AI  Mild TR, Trace MR/MI  Assessment: 89 y/o Man withf dementia, b/l hip surgery brought in for worsening agitation accompanied by son. Son states for past 2-3 days his dementia/confusion has gotten worse to the point he is not making sense and unable to ambulate well. The son heard a thump upstairs and found pt on the floor attempting to get up; he had a mild head contusion, but no active bleeding. No history of seizures. History is limited due to dementia, pt required sedation for agitation in ED. NSTEMI vs. demand ischemia.    Plan:     Tele monitoring.  Con't with:  aspirin enteric coated 81milliGRAM(s) Oral daily  heparin  Injectable 5000Unit(s) SubCutaneous every 12 hours  sodium chloride 0.9%. 1000milliLiter(s) IV Continuous <Continuous>  OLANZapine 5milliGRAM(s) Oral every 12 hours    Supportive care.  Braxton Lafleur MD, FACC, SRINIVAS, ANA MARIA, FACP  Director, Heart Failure Services  [facility]  , Department of Cardiology  [facility]    Last office Visit Note from 17:    Reason For Visit  GyufsgemkzFZY4xcz3ix9-4vcr-14t6-n786-57d40d14nj03EdroCwrfv MujreYmcudjv0Oqser MIGUEL CASIANO is being seen for a clinic follow-up of coronary artery disease and Heart murmur. AypliTlsuubr3Vpu OfsjlNyskrhb9Ucqol ChbvwPxclvmy9Zjk AmrjYnqjwqo39Kyyss RxjhByncxqw64Qmb OauezoUsvuxgt05Qlksj IvqbxcDmvrwao98Kif TbywpybelmMCU7rxp9uh2-9jlx-75h2-l199-86q90b26xs03XedwTwz        ReasonForVisitSectionEnd  HistoryofPresentIllnessSectionStart History of Present Illness  RpxmoyzwvSIMat267661-p55l-3gcn-bsxx-390968u5i938VtsqAxnco EufzHuhhIdsud2Jgqaj 89-year-old male followed for history of coronary artery disease, Also followed for history of diastolic heart failure, hypertension, hyperlipidemia and aortic stenosis.   Miguel has become quite hard of hearing and it was difficult to get an adequate history. He claims he is compliant with his medications however he can't recall which medications he is taking or how many. He denies any recent chest pain or shortness of breath. His blood pressures, which have been somewhat poorly controlled in the past were extremely high today.No recent labs have been drawn. FefkPtavLmrsr5Zpc FzvizlzvqOCCph163228-c15g-1fnk-rgnu-009822c5m870CajqVnv        HistoryofPresentIllnessSectionEnd  CardiologySummarySectionStart Cardiology Summary  IqewypsvkxWezihkuv082p4gj-si80-356a-l369-s8g932z71156LeijXoqbu OcsjnAixmvpt7Ncudz   EK17, Sinus Bradycardia -First degree A-V block Voltage criteria for LVH (R(I)+S(III) exceeds 2.50 mV) -Voltage criteria w/o ST/T abnormality may be normal.  -consider old anterior infarct.  -Nonspecific ST depression + Nonspecific T-abnormality YrkemPxccaqr1Zoy LtcpeZhfhxbd18Goxyw   Stress Test: 4/10, no Ischemia, no exercise induced arrhythmias, no Symptoms ZoykcWibpzjz27Nkf TshphYieqbkn80Odpwi   Echo: 14, Mild AS, AI, MR. Moderate TR. Mild pulmonary hypertension. LVEF 50%. 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 ZgfahGbrrwtx2Red KwijeCrfcirq66Smiys XfddtFbyluzi14Hgs MzaufVlanpgp37Vbviz CailjGmkhxss33Zgg GkqaaSdfopnt67Lcdac GagvnNogzjhw24Bag UugimBhdqmga89Gvbqy SqlfcVqqcjvl35Rcy PjuujHaulqzd47Depol WlvuvEjtwshj86Nae EkiufUkzptxm23Lbbqg WjzxnJufngpe13Wue TmzubIqgpftp25Crwsi ScstePqvudgv65Vjq XvwfsQrrizdi49Ipimg LvfueDdxxmij60Ves LreenNzfzdpg76Mczfq YnkilGbujjib89Gmp HsmugTvoxwwc29Xoxmp ZlmakHfusvqd18Imp WmxlxOcatfss00Qvkkn JllfrQtpowuo94Igj NlytmtojgySzrurjjy653w6lp-tb28-706n-l198-f0m728n71983GvvzNuo        CardiologySummarySectionEnd  ActiveProblemsSectionStart Active Problems  ActiveProblems_20_twCiteListControlStart Aortic stenosis, moderate (424.1) (I35.0)  Bilateral hearing loss (389.9) (H91.93)  Chronic diastolic heart failure (428.32) (I50.32)  HTN (hypertension) (401.9) (I10)  Hyperlipidemia (272.4) (E78.5)  Systolic murmur (785.2) (R01.1)  ActiveProblems_20_twCiteListControlEnd     ActiveProblemsSectionEnd  PastMedicalHistorySectionStart Past Medical History  PastMedicalHistory_20_twCiteListControlStart History of Elevated cholesterol (272.0) (E78.00)  History of H/O cardiac catheterization (V45.89) (Z98.890)  History of H/O total hip arthroplasty (V43.64) (Z96.649)  History of angina pectoris (V12.59) (Z86.79)  History of sinus bradycardia (V12.59) (Z86.79)  History of Osteoarthritis, hip, bilateral (715.95) (M16.0)  PastMedicalHistory_20_twCiteListControlEnd     PastMedicalHistorySectionEnd  SurgicalHistorySectionStart Surgical History  SurgicalHistory_20_twCiteListControlStart History of Total Hip Replacement  SurgicalHistory_20_twCiteListControlEnd     SurgicalHistorySectionEnd  FamilyHistorySectionStart Family History  FamilyHistory_20_twCiteListControlStart Family history of Hearing loss, unspecified laterality : Father  No pertinent family history : Mother  FamilyHistory_20_twCiteListControlEnd     FamilyHistorySectionEnd  SocialHistorySectionStart Social History  SocialHistory_20_twCiteListControlStart Former smoker (V15.82) (Z87.891)    Never smoker  Non-smoker (V49.89) (Z78.9)  Social alcohol use (Z78.9)  SocialHistory_20_twCiteListControlEnd     SocialHistorySectionEnd  CurrentMedsSectionStart Current Meds  CurrentMeds_4_twCiteListControlStart Aspirin 325 MG Oral Tablet  Crestor 40 MG Oral Tablet; TAKE 1 TABLET BY MOUTH EVERY DAY  Fenofibric Acid 135 MG Oral Capsule Delayed Release; TAKE 1 CAPSULE BY MOUTH  DAILY  Fenofibric Acid CPDR  Isosorbide Mononitrate ER 60 MG Oral Tablet Extended Release 24 Hour; TAKE 1  TABLET BY MOUTH EVERY DAY  CurrentMeds_4_twCiteListControlEnd     CurrentMedsSectionEnd  AllergiesSectionStart Allergies  Allergies_20_twCiteListControlStart No Known Drug Allergies  Allergies_20_twCiteListControlEnd     AllergiesSectionEnd  HistoryReviewedSectionStart History Reviewed  PfbrvloFucqawvu23836276-68a3-19a3-8152-bfim4uxfku17BgnlIsduu YciaJsarmny1Hgaje History reviewed. PsoqRvqxbid3Mlf EfgkGlgxgch2Glnju Medications and allergies reviewed. DaxuMjgejkv0Jwn ZxukqukPxzivsrb74692073-16k1-46j1-1070-ryha0hrnlw18VrhnMnj        HistoryReviewedSectionEnd  ReviewofSystemsSectionStart Review of Systems  Jpexxjem5297ew1m-70f4-233z-zz40-zi4nj34c8q1hHdkgXgess PwoisWvtkeju5Eyyzh VrrnqOkjfsgp5Fyu FxqfzJgycagk7Lohat WugnwAdgqfhd7Bvi VyvnqHwgtkey1Ahqli FstpnDsfmspq6Iqx WwtbcXbqsssm0Tzihj PrqejHgpyxzx5Bvw EfvooBpzjhsi96Vjzcr XunaxAvggqrf99Rkq DwtqyYyrnssa51Pddaq KabfuYwafdbw48Xlc KjcnzCsuezng62Vccgv MjnwkPbuugqj08Qha UatcmLdjghdi63Jmmgu YazotMqgtvxm13Ftd XjplrIatleoh17Rwmyp VrgqeWtvotwd96Kgq HrwosMzvknzc4Nilat WstjoVnlyval8Glr IrikzVwnryhl7Aqfhg JsrztLvspknk5Qss NvyolWntlpeo6Khjte WrvepZcugzux6Fxi TxzylOwuakse8Sfxsx RodbiUzahxgb4Eep IlxmoVikxkyz54Uwegv RmoflCvzpgpc24Tja OgcymHrzzbfw39Pvrxf   Constitutional, Eyes, ENT, Cardiovascular, Respiratory, Gastrointestinal, Genitourinary, Musculoskeletal, Integumentary, Neurological, Psychiatric, Endocrine and Heme/Lymph are otherwise negative. LpiuyMufnakb87Nue Gslodswb2812nz1d-85i9-520j-dg75-rq4vc97c1c5zPnvpJmj        ReviewofSystemsSectionEnd   VitalsSectionStart Vitals  Vitals_110_twCiteListControlStart Vital Signs    ** Printed in Appendix #1 below.   Vitals_110_twCiteListControlEnd     VitalsSectionEnd  PhysicalExamSectionStart Physical Exam  Constitutional(Brief)PCVMD7j4b787p-1t47-033u-88e8-8dv53xfw3r97UgsjHofpb VStart Constitutional: well developed, normal appearance, well groomed, well nourished, no deformities and no acute distress. VEnd EhbmnOyzeelj0Fnuqw WhqdbVndupin0Vmj AlvomOgiottw1Ihetm KzorwUqacapp2Cvx PyfkxDgnksya5Zoweo WudoeSwttspr7Poj UtcrnUzqwvio0Gvxgs BkpgkFxegkfw2Zis Constitutional(Brief)TNWTV0q5h721v-6b88-941n-04u8-9qv02jut3m55EzuhGcv Eye(Brief)[id number]-2259-[id number]   VStart Eyes: the conjunctiva exhibited no abnormalities and the eyelids demonstrated no xanthelasmas. VEnd XodizZrknkqg0Vsemt VnsbfXvxayku0Sio Eye(Brief)[id number]-6160-[id number] HEENT(Brief)RQRBV913x7p9p-18ji-725t-e686-[id number]   TvanwEevalkf2Jdqnj HEENT: normal oral mucosa, no oral pallor and no oral cyanosis. KeplwCkiazmb6Ubo BnzqvYmuzwun3Tilfp ZnopzGojoluh6Pik FxhkTkwkPlecz4Ofahp CrbuCdwyCjxav3Lsy HEENT(Brief)XORBZ120u6y6w-58wb-357g-g656-[id number] Neck(Brief)EDKFY353zq961-w54b-0dvj-u6h5-xr094147702rKgllZmdya   GkolcIqqcqzg35Oihvn Neck: normal jugular venous A waves present, normal jugular venous V waves present and no jugular venous lema A waves. TdydlTqalbqs91Lyu JynjhIliphcr5Dacdn FrsecVctqxhp8Lrq Neck(Brief)[id number]-k7b0-[id number] Pulmonary(Brief)DLBJFiyy821v2-4hnl-57b1-19w3-r0p8498usfdrEwaiZuvgt   WxpolXpbbkgo80Delxb Pulmonary: no respiratory distress, normal respiratory rhythm and effort, no accessory muscle use and lungs were clear to auscultation bilaterally. CgppmJyhysbi75Ptv EvqqrXjspbef41Nstzr XglhkHzvfahn91Jba RbdmnGzqortw30Qymyj NrrlcSdgjufx98Cxd KaisrLvdkgdg85Zqxvw CypewEpnfbxh92Mzu MdukuKvhoacz81Maxtq FrwdmAqihszb73Vnp FasrkZqskprp06Mcffn AnbtuIzejakn95Lxs WiqcaRkulsbs51Nwtwd HyisbOhsmxdh77Haw OjckeFzkvvqk1Egbzr UhpkaJjrotkf7Gsq Pulmonary(Brief)FTAHNtvw474l7-2zou-82o0-08b7-k7v4485rvfgqFimlEgi Abdomen(Brief)AWWAG72na79u9-62r1-5yy6-49t8-xqsp23p43y6hZsfqOceig   VStart Abdomen: soft, non-tender, no hepato-splenomegaly and no abdominal mass palpated. VEnd PuoqcMesqevq2Iboar HufgzPydklow0Osf WzrtvHvtmavs2Arpxt VrtlvCcommet9Kid QizhzSnubaqq8Adrxm LydycHgcstiw0Lti UtrnfJwlarqp7Uwegu PnhtiGdbypjr2Jwj IemzdUlrtxhs4Mvvvk KllfqXsqyywc6Jtc ZzmjfWczgxsx8Guvre LrxgwXqycjuy4Mun SaxbtDxkjuqi3Hchsm YzjulTbnsfsu2Bfe Abdomen(Brief)KANLL58kk04d2-52s9-1tf3-29r4-vllk02n78a4dQdulRop Musculoskeletal(Brief)XZVLH423o6881-4nms-6414-y7b6-z270m052fkl8SaoqJrgzw   AzwqxEtyyoye1Lemno Musculoskeletal: normal gait . the gait was sufficient for exercise testing. StigdQmhzeye0Obv OinltDqelrxy1Rzlbj PexccSkwtqfh8Vmr IsbunKnmlsqv9Jrokq LyiyxFdbtids2Yuk Musculoskeletal(Brief)EIKKF101y7353-8ywe-7430-g8q5-t427o273xhr2QgdyHpi Extremities(Brief)HIQCS8041u0uu-0m3v-[id number]   SimoqMgnxubd9Iqrgb Extremities: no clubbing of the fingernails, no localized cyanosis, no petechial hemorrhages and no ischemic changes. MyzlzXlfrgre9Cbp ChsofDjgfank3Bzlbp YpdidVqulwnx3Znt RhegbRnitrdh9Dknae VskhjCrtofha7Jyr QzuybWutvhrh9Xoxyt EybaoSixcsqy8Det UlnctYfckprv8Xkteh ItwdqEsmrchy0Mid IcymxDzlzjau5Iumha DwqjdTnecljk5Kpa KximlWjgunyv9Zyftk ItrqkOezcirb8Nvx WlqrfEpxppzg8Mjrdg CxchjXvifnux4Anh Extremities(Brief)KCIPX6767a7pm-8u2n-6yc3-6707-21308d755913NvwhBbn Skin(Brief)TOJGX7oi14oj3-6d8m-631n-s1na-3eujmj64y8qjZikrZldof   KeesdUukpvoc4Enibz Skin: normal skin color and pigmentation, no rash, no venous stasis, no skin lesions, no skin ulcer and no xanthoma was observed. UljobHacfiuf8Ivv CrkjcDleklun6Drmei FvcedBokfdbh4Eax WonjyQzpoozx1Nemua EktivOvrgqll8Gpt SjwwnMlwotwy0Zzfpj WfkdjGnvgkol0Bkg JlcbtKrtxcfh4Mqcin TuvxiInmsnxw2Dtr BpxznJfrkxzh4Lljcg LqfdaUogvygz9Thf Skin(Brief)AETRI5qy21jj5-9s9y-223b-n4wa-0mbqde11j3rvPsdxXgu Psychiatric(Brief)QJWECn181491m-h0e5-6690-y957-b7zf17vo2601WeqvMrwha   YbaojPgglpfc2Rottb Psychiatric: oriented to person, place, and time, the affect was normal, the mood was normal and not feeling anxious. VqqtsRtplbeo5Zci NeqwxVonmena5Vjdmc UxqdzJcfnmjm8Nwe HuvveFldqwml8Dccke CtywiBcuqbjk8Htl UdpudFknpmyg0Ikrpl UpmvpTdbktmu8Yeg QkvbaGwfstfe5Kwdpp GggziYnxcfbm2Uhz IucoqKdtxkmo7Mfcwm PieoaCztktqp4Vzi BwzlbAijomfq5Nfabk PflwcOxjeiql4Dth JzfmrEaqcvkt56Frgdg FwwvhHrblmqd27Jgz Psychiatric(Brief)OVZNDo321347y-z3u9-6699-u386-s8rr14pm7742YskkXfz KujhuodSOLMGKfhlilk28zbe-d4v7-952t-158j-0g286g3x85u6NkdhOfkjb   NlsdqDvrrmap47Uulpb Cardiovascular: LowrePnxquup94Zku LcqplEndopzg64Wqfii GakojYzlrshw31Rvx UcdhvIjxqxxv26Tgket The PMI was palpated at the 5th LICS in the midclavicular line. XfznbTmxhtid33Vob ZnwbwVwzwccv5Swjbc The apical impulse was dyskinetic. WutrxUyfethn0Sgx LqltjGfmvjst32Wcxxt a precordial heave was noted at the left lower parasternal line. NwtbhNjjcdse85Hsl ZdcalQtcfdmc2Qjkoe GxsrqPrisoyo3Jiu RbvomHsorzoh8Mvteo The heart rate was bradycardic, 52 bpm. AerymAsvxjkn8Zdj RnjflTtfocyq15Syrln The rhythm was regular. YagakTdgstzm82Aii JlgjuBsgoiil6Mfqev Heart sounds: abnormal S1, normal S2. MwudbVlphizz8Tok [id number] [id number] SbqokJlowyqd44Ckpwc BbyazQgpaiwh52Cka WjilvGdrwhla8Krtcp LpgsoJenxedg1Jnv QmmbaGjkonna99Ncxwv XprdtNwsblbf20Ewf UvstjYeypgal97Edpgz GsmfpFbuueic55Dmp WnvxfGbzpuno78Kojzv GgdvxTbaanrm93Ybj FkbxoJseqfcn0Cfxkw UxtywLszxnuw8Jqh JqmedAeqxktk38Bvjny BfiluCrgxcnl48Yvm UyyrnCoeyozs18Jzeec   A grade 2 systolic murmur was heard at the [facility]. NbaquApvvjtu84Sya UmvsqXulmwok53Fbycq UvymoFmvmpih81Uiv JmkxnQxdxijn61Kfrdz BzjorUzdkygw73Qew ScfjcMykrlej63Spckr JcbkrPrvtymf03Vnh SniajDxnkabp33Lheob IrwjtZmeempu47Enr EtsecTpbqpqb01Abkio JsnhtYktmcru28Tyx YgswkPpirtgc60Oztma YlbbnKgcgrww72Tph XqhthZoycmic79Ricpx GpkywPvnrekq17Bgo IzbgkLqqbrvz39Yxspg ClticNagmaxa41Ceh XcdxsCxzqflr39Tosxc BafxzBeoautx40Tlr EveqzLvzcxsv43Akpwc FvwxqGnckrxy81Ctv WcsxtIwiwjbe50Dwuzs YvtpjJqcffmi69Sck ZbkapNwqcnxi53Ydajq AcmntNlhgpuo39Ujr WigarZdgfseg72Qyccq EqjakBgqoeqy43Ijt LyiwzJpgyusg92Ukdit VrqwvHeuveoz91Ari CsgaeIrhpszb70Iqspa KdislKuxzdge19Alg WeqpyDxnkefb68Okwyr CdamdLyrltoj66Nyc NuscbPgmvwci71Cnrvm   Pulses: no bruit heard over the right carotid and no bruit heard over the left carotid. FylnkJxmhmwz36Jxv OyudvUjthgpg98Jpsbq CawdmPjdazlu93Qte RzkigYdaalsb59Uodqv ZjjhyAupsmdz81Rmg AqahmDivblxo98Jnjms PftcvHhovutm29Hyc OztmsFhxtigz89Bzcmp UypefSbqiwta56Otm DyiubZefemun77Syxxz KbdscAsnfkgo73Jph KjdlaFraozjm35Mcolm Femoral: right 2+ and left 2+. CogxsGztsymk92Lzl XfspaHcblgxr64Jggvu CzfxkYiquzah91Cow MybhgAelaxyx34Gwbxz SiktcDehtyqp27Uqm IrlmaSirjdxl67Olldm KjbxfLvchaph81Sbp OeaedRxftidt37Ntkjq GbwktDxaetvy49Rwu WkbzsPkdnhku90Ogbvr TphwzKuusoiz11Aky MpshcXvwqovr94Hcbem Dorsalis pedis: right 2+ and left 2+. QfshrOiqveke84Ehq SfukmLfklgzr43Wcsgr TumntVkepbio96Oqe BrlzvSjkrjud65Lxygf   Abdominal Aorta: the abdominal aorta was not enlarged and no bruit was heard, no bruit heard. CmvuiPntmoet44Jve UhvbcEtldydb23Efkvx   Lower Extremities: no varicose veins of the right leg and no varicose veins of the left leg. PnmpjTyqswyl72Gok SxhoaBiofpeg94Mncjx DfmfsGzcessc20Xat UcdrxJzjvlhe70Mdjjc LqwgtNczflou42Ynq GpdzcTyehxdh38Zjevq NdjwrPntagdu66Tog YklwcWxuthlq62Mqnta QqmnlSsgqsgm38Fhg IfwudGbfkeml01Zuwxs XmsgtApwuxur48Bdy ZrmlmIwhztzt99Kkknz DvfpiTtnftxb76Snc BaxneYmuvghg50Dvqgm CwnsyMkvtxgb51Imk IsvxvvpCLVYLKcgaphx96bab-c6h9-119f-023n-6y195g2o44j9GjymFkj        PhysicalExamSectionEnd  AssessmentSectionStart Assessment  Assessment_80_twCiteListControlStart Aortic stenosis, moderate (424.1) (I35.0)  Bilateral hearing loss (389.9) (H91.93)  Chronic diastolic heart failure (428.32) (I50.32)  HTN (hypertension) (401.9) (I10)  Hyperlipidemia (272.4) (E78.5)  Systolic murmur (785.2) (R01.1)  Assessment_80_twCiteListControlEnd     AssessmentSectionEnd  PlanSectionStart Plan  Plan_2_twCiteListControlStart Aortic stenosis, moderate, Chronic diastolic heart failure, Hyperlipidemia   Complete Blood Count w DIFF; Status:Active; Requested for:05Kyp3511;   Comprehensive Metabolic Panel; Status:Active; Requested for:58Dma1678;   C-Reactive Protein Cardiac; Status:Active; Requested for:81Ipa1776;   Free T3; Status:Active; Requested for:2017;   Free T4; Status:Active; Requested for:2017;   Hemoglobin A1C, Whole Blood; Status:Active; Requested for:2017;   Lipid Profile; Status:Active; Requested for:2017;   Magnesium, Serum; Status:Active; Requested for:2017;   Thyroid Stimulating Hormone, Serum; Status:Active; Requested for:2017;   Vitamin D, 25-Hydroxy; Status:Active; Requested for:2017;   HTN (hypertension)   Echocardiogram; Status:Hold For - Scheduling; Requested for:2017;   Follow Up___ Outpatient  .with nurse practitioner  Status: Hold For - Scheduling   Requested for: 2017  Plan_2_twCiteListControlEnd     PlanSectionEnd  Discussion/SummarySectionStart Discussion/Summary  CardiologyD/[id number] KgvswSzutgka9Vtavm DgdujPmevphu9Nnv YdolwIxenfhg00Ixjgf VixcwKyopdhz28Uhw GgyfiGjxvieu0Gkhnk HqmneXxobghb3Grj DxopjSakdfvv52Sivpe ArzhrMhgxpxq90Nae NisbgBrzveod8Fnvil KvdawZykofoc4Bgf EbtzeQevyqrq3Amzvb MmlpyZmgelxk9Qiv KqjvdGgsvaez7Hxdev FmztkMqdxywn1Uqw ViwlyJctttte3Tgyya RlhbyTlwmcvw5Fcf HoaklLzrgbih8Mdyxs EcrrfSmonnyk1Ewv UxfsoLynngcl28Xkldi   Coronary Artery Disease Discussion/Summary:   Impression: coronary artery disease. Currently, this condition is stable. Medication Changes: none. JtbeeKjefftl91Gmk UcyewEaxqqtx5Nnepb SqjrcEiqthjz8Jgg RndlpFhenkjp96Gmmdm XafkfZivoivd20Lgq OnniuGxldgtj9Nznjo   Hyperlipidemia Discussion/Summary:   The impression is hyperlipidemia. Currently, this condition is stable. The diagnostic plan includes a fasting lipid profile. KjqhmEofcnzx0Oex OpzwrKrflsxa24Jagqu   Hypertension Discussion/Summary: The impression is hypertension. Currently, the condition is stable. Medication Changes: none. NkauwXyksqjg23Rri BvlgwGckpfil41Mxnvw MaesuZqvclpb48Fcz SztbiNqzello16Aprsw JebduUoecdpu90Uub SfvoqFpfjvja87Jvias OkdboJsmgicz80Jmr GqncpWccphio20Coiwh   Pulmonary Hypertension Discussion/Summary: The impression is pulmonary arterial hypertension. Currently, the condition is stable. Medication Changes: none. PggmtGslwswj87Fzd RklytBhjaysq40Syruw XzxffPryombr37Bhr GrfdkArzseup32Mrxyl IkgjlRyzztaf77Gow ToqzwCivfese35Ejcha VbersTxmbmem35Tke BzitvEovirxx02Kgucn FsniiFjohjte01Dfe SfsqfOyuqddm11Ocmcg RnhlzJcmojan25Hcp KkqzCnymHqcbh7Dkzkj IqyxUzvxQygoc7Fnp QqhqfHdcjgop98Ajszf   The treatment plan was discussed with for 20 minutes. KenwlPnvqmsg21Afl UwtrUjfcDsruz1Zgomv PwsyQwcyNirwp8Kzm GfhycUepreho88Odvva   Patient is instructed to follow up in 1 month(s) with me. QuibuEjvgbvs37Mtv CardiologyD/[id number] Sssuwyyvxc0352ruq-71qu-2455-733u-08h1k9t3k9paFzftYnpvj   TexutZfrpocz9Vsyli Poorly controlled hypertension, patient denies any blood pressure issues. Will review his meds and repeat low pressures early next week along with an echocardiogram to reevaluate his aortic stenosis. Labs were drawn today. ZjdfmQunkxmt0Pzw UoeesDiywlyg1Qglfi Chief Complaint:  Patient is a 89y old  Male who presents with a chief complaint of Agitation with fall. (2017 05:15)      HPI:  89 y/o Man withf dementia, b/l hip surgery brought in for worsening agitation accompanied by son. Son states for past 2-3 days his dementia/confusion has gotten worse to the point he is not making sense and unable to ambulate well. The son heard a thump upstairs and found pt on the floor attempting to get up; he had a mild head contusion, but no active bleeding. No history of seizures. History is limited due to dementia, pt required sedation for agitation in ED. (2017 05:15)    Allergies:        Allergies:  	No Known Allergies:     Medications    CurrentMeds_4_twCiteListControlStart Aspirin 325 MG Oral Tablet  Crestor 40 MG Oral Tablet; TAKE 1 TABLET BY MOUTH EVERY DAY  Fenofibric Acid 135 MG Oral Capsule Delayed Release; TAKE 1 CAPSULE BY MOUTH  DAILY  Fenofibric Acid CPDR  Isosorbide Mononitrate ER 60 MG Oral Tablet Extended Release 24 Hour; TAKE 1  TABLET BY MOUTH EVERY DAY      Past Medical History:  Dementia.    Past Surgical History:  No significant past surgical history.    Family History:  No pertinent family history in first degree relatives.    Social History:  · Marital Status		  · Lives With	spouse	    Substance Use History:  · Substance Use	never used	    Alcohol Use History:  · Have you ever consumed alcohol	unknown	    Tobacco Usage:  · Tobacco Usage: Unknown if ever smoked	    Review of Systems:  General:  No wt loss, fevers, chills, night sweats  Eyes:  Good vision, no reported pain  ENT:  No sore throat, pain, runny nose, dysphagia  CV:  No pain, palpitation hypo/hypertension  Resp:  No dyspnea, cough, tachypnea, wheezing  GI:  No pain, nausea, vomiting, diarrhea, constipation  :  No pain, bleeding, incontinence, nocturia  Muscle:  No pain, weakness  Breast:  No pain, abscess, mass, discharge  Neuro:  No weakness, tingling, memory problems  Psych:  No fatigue, insomnia, mood problems, depression  Endocrine:  No polyuria, polydypsia, cold/heat intolerance  Heme:  No petechiae, ecchymosis, easy bruisability  Skin:  No rash, edema    Discussed with:  PMD, Family    Physical Exam:  Vital Signs:  Vital Signs Last 24 Hrs  T(C): 37.1, Max: 37.8 (04-10 @ 22:04)  T(F): 98.8, Max: 100 (04-10 @ 22:04)  HR: 78 (62 - 78)  BP: 161/67 (138/71 - 210/104)  RR: 18 (17 - 18)  SpO2: 95% (95% - 98%)  Daily Height in cm: 167.64 (2017 10:20)    Daily Weight in k.3 (2017 10:20)    Tele:   General:  Appears stated age, well-groomed, well-nourished, no distress  HEENT:  NC/AT, patent nares w/ pink mucosa, OP clear w/o lesions, EOMI, conjunctivae clear, no thyromegaly, no JVD  Chest:  Full & symmetric excursion, no increased effort, breath sounds clear  Cardiovascular:  Regular rhythm, S1, S2, no murmur/rub/S3/S4, no carotid bruit, radial/pedal pulses 2+  Abdomen:  Soft, non-tender, non-distended, normoactive bowel sounds  Extremities:  no edema  Skin:  No rash/erythema. Skin is warm/dry  Musculoskeletal:  Full ROM in all joints w/o swelling/tenderness/effusion  Neuro/Psych:  Confused.     Laboratory:                            10.3   6.8   )-----------( 223      ( 2017 05:52 )             30.2     04-11    146<H>  |  111<H>  |  39<H>  ----------------------------<  104<H>  3.7   |  23  |  1.04    Ca    7.8<L>      2017 05:52    TPro  6.2  /  Alb  3.4  /  TBili  0.5  /  DBili  x   /  AST  34  /  ALT  21  /  AlkPhos  80  04-10      CARDIAC MARKERS ( 2017 05:52 )  2.100 ng/mL / x     / x     / x     / x      CARDIAC MARKERS ( 10 Apr 2017 22:11 )  .612 ng/mL / x     / x     / x     / x          LIVER FUNCTIONS - ( 10 Apr 2017 22:11 )  Alb: 3.4 g/dL / Pro: 6.2 gm/dL / ALK PHOS: 80 U/L / ALT: 21 U/L / AST: 34 U/L / GGT: x             Urinalysis Basic - ( 2017 02:45 )    Color: Yellow / Appearance: Clear / S.010 / pH: x  Gluc: x / Ketone: Trace  / Bili: Negative / Urobili: Negative mg/dL   Blood: x / Protein: 15 mg/dL / Nitrite: Negative   Leuk Esterase: Negative / RBC: 3-5 /HPF / WBC x   Sq Epi: x / Non Sq Epi: x / Bacteria: Occasional      Imaging:  ECG:    EXAM:  CHEST SINGLE VIEW                            PROCEDURE DATE:  04/10/2017        INTERPRETATION:  cough    A frontal chest film is severely rotated. Interstitial markings are   prominent    Heart is enlarged. Vascular markings are increased      No mediastinal or hilar adenopathy is suggested. .     The osseous structures appear intact intact.     IMPRESSION:  Severely rotated film. Cardiomegaly with congestive changes.    EXAM:  CT CERVICAL SPINE                          EXAM:  CT BRAIN                        PROCEDURE DATE:  2017    IMPRESSION:     CT BRAIN: No acute intracranial bleeding, mass effect, or shift.     Volume loss and chronic microvascular ischemic change.    CT CERVICAL SPINE: Diffuse osteopenia without displaced fracture.    Grade 2 C7-T1 anterolisthesis due to advanced facet degeneration.    C4-C5 degenerative cord compression and at least moderate canal stenosis   due to disc osteophyte complex and OPLL. If no prior imaging is available   for comparison, consider nonemergent MRI cervical spine to assess for   cord edema or myomalacia. Clinically correlate for myelopathy.      Echo from 9-11-15  Conclusions: EwhthMnmagjc08Lbw JyxgxNojscaq110Qsbda   1. Normal right and left ventricular systolic function. Biatrial enlargement. Grade 1 diastolic dysfunction. DfineFpdsizg758Bsc CopkeBoooeyo063Ybjuf BsxsuLoiebvc497Akx InsofIlhtmfg977Mckrk   2. Moderate tricuspid insufficiency. Mild mitral and aortic insufficiency. Mild aortic stenosis.   3. No pericardial effusion. ArdpmMuaaihd498Sng XweakGxcowdg653Cyplr ZaicoZpogqsp597Lzy ClynuWyudoqa712Ysbeo NypcmAtfoauu491Aqx NodheYpgpxyf887Ueiwm WbavkBwptwis611Vpq PzrjcQflpmvk297Flsyt 4. As compared to previous study dated 14, no significant interval changes are seen. UaeqlWdrkwmw891Ink Niatwwsacqtqkusw7j4r0r-4o0c-4m5g-8w7n-uq3e84cr7l3yMmkdDpc      Echo from 2017  EF 60-65  Mild / moderate AS with mild AI  Mild TR, Trace MR/MT    Assessment: 89 y/o Man withf dementia, b/l hip surgery brought in for worsening agitation accompanied by son. Son states for past 2-3 days his dementia/confusion has gotten worse to the point he is not making sense and unable to ambulate well. The son heard a thump upstairs and found pt on the floor attempting to get up; he had a mild head contusion, but no active bleeding. No history of seizures. History is limited due to dementia, pt required sedation for agitation in ED. NSTEMI vs. demand ischemia.    Plan:     Tele monitoring.  Con't with:  aspirin enteric coated 81milliGRAM(s) Oral daily  heparin  Injectable 5000Unit(s) SubCutaneous every 12 hours  sodium chloride 0.9%. 1000milliLiter(s) IV Continuous <Continuous>  OLANZapine 5milliGRAM(s) Oral every 12 hours    Supportive care.  Braxton Lafleur MD, FACC, FASE, FASNC, FACP  Director, Heart Failure Services  [facility]  , Department of Cardiology  [facility]    Last office Visit Note from 17:    Reason For Visit  VomajffexxBHG9rot8rd6-1apk-69c5-j313-91d37v52ir61VjnrHoyxh JmiwzItzfvcs7Tfklc ROLY CASIANO is being seen for a clinic follow-up of coronary artery disease and Heart murmur. FazjzLdmargq4Qnm VejjdVwuuakr4Itdgh YyhgmGnblrzn4Wts AyfaPrcuyhk04Wjyve LmrpBhhhssv06Fut DvfcjpRrhdoef97Nyppx ElxyhoWgbjuwb47Swi HwxliqiczqGNR5zlx4bt8-8duf-69e3-k822-51z58f03jn63TkxhNmv        ReasonForVisitSectionEnd  HistoryofPresentIllnessSectionStart History of Present Illness  OptazhiygSNFxm041253-p64h-5psx-lpmm-520809t4r023XyirDbzbn KqfyLszmGbown8Dnzxu 89-year-old male followed for history of coronary artery disease, Also followed for history of diastolic heart failure, hypertension, hyperlipidemia and aortic stenosis.   Roly has become quite hard of hearing and it was difficult to get an adequate history. He claims he is compliant with his medications however he can't recall which medications he is taking or how many. He denies any recent chest pain or shortness of breath. His blood pressures, which have been somewhat poorly controlled in the past were extremely high today.No recent labs have been drawn. AkvfVldnDjmei3Rsn CaemlduptWQVph994664-d42n-0eua-yqhc-006500d5l693NnkxDad        HistoryofPresentIllnessSectionEnd  CardiologySummarySectionStart Cardiology Summary  BafsfyqmtpBgigyjui561p6wx-yp07-527y-b071-k0u824a02183EzhxXcnqc BoxirCvurfch6Iphqs   EK17, Sinus Bradycardia -First degree A-V block Voltage criteria for LVH (R(I)+S(III) exceeds 2.50 mV) -Voltage criteria w/o ST/T abnormality may be normal.  -consider old anterior infarct.  -Nonspecific ST depression + Nonspecific T-abnormality SpylbKvvirzw5Vpi EyzgmZdkahun60Okahv   Stress Test: 4/10, no Ischemia, no exercise induced arrhythmias, no Symptoms XxlkjIkovobq39Ele XgarzQhxooah74Mhjjf   Echo: 14, Mild AS, AI, MR. Moderate TR. Mild pulmonary hypertension. LVEF 50%. 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 NwnpkGrstsng3Hus SlhheKvlzqnz96Eehux YvsvvRfkxeiq32Hsv FxsqqXpjvibw87Hfmrw YybwwVyzzqbx56Bzl WldnfJtookls03Ftpmh ZhxhiWwscfty08Gtk DvizpEspmlar41Jvexd SapcePaiojzu75Ihn EladcGetyazp42Dulkv OvgrpXzebicy55Cnt ZqbuyEomgiqk65Byicj UflnbYtuqoga54Ktp DnsygXkviozq25Pmczh CvdjfHslexhv85Rqq OfsqrYtwvosj04Sabor EwmqjFsxehcf33Fsa CphovFckgjlz71Xmwqe IbihtYeeliqc58Eab HcpvsZduhwhk96Leixd QjihaWbsfsex24Jaw ZahxiFzrajxq84Hjsmx RsixtAteuzwv90Kyv RkcrxuzvxwWtmbnhhn883w4qv-so43-309v-s314-y8p280u74519CswlLvi        CardiologySummarySectionEnd  ActiveProblemsSectionStart Active Problems  ActiveProblems_20_twCiteListControlStart Aortic stenosis, moderate (424.1) (I35.0)  Bilateral hearing loss (389.9) (H91.93)  Chronic diastolic heart failure (428.32) (I50.32)  HTN (hypertension) (401.9) (I10)  Hyperlipidemia (272.4) (E78.5)  Systolic murmur (785.2) (R01.1)  ActiveProblems_20_twCiteListControlEnd     ActiveProblemsSectionEnd  PastMedicalHistorySectionStart Past Medical History  PastMedicalHistory_20_twCiteListControlStart History of Elevated cholesterol (272.0) (E78.00)  History of H/O cardiac catheterization (V45.89) (Z98.890)  History of H/O total hip arthroplasty (V43.64) (Z96.649)  History of angina pectoris (V12.59) (Z86.79)  History of sinus bradycardia (V12.59) (Z86.79)  History of Osteoarthritis, hip, bilateral (715.95) (M16.0)  PastMedicalHistory_20_twCiteListControlEnd     PastMedicalHistorySectionEnd  SurgicalHistorySectionStart Surgical History  SurgicalHistory_20_twCiteListControlStart History of Total Hip Replacement  SurgicalHistory_20_twCiteListControlEnd     SurgicalHistorySectionEnd  FamilyHistorySectionStart Family History  FamilyHistory_20_twCiteListControlStart Family history of Hearing loss, unspecified laterality : Father  No pertinent family history : Mother  FamilyHistory_20_twCiteListControlEnd     FamilyHistorySectionEnd  SocialHistorySectionStart Social History  SocialHistory_20_twCiteListControlStart Former smoker (V15.82) (Z87.891)    Never smoker  Non-smoker (V49.89) (Z78.9)  Social alcohol use (Z78.9)  SocialHistory_20_twCiteListControlEnd     SocialHistorySectionEnd  CurrentMedsSectionStart Current Meds  CurrentMeds_4_twCiteListControlStart Aspirin 325 MG Oral Tablet  Crestor 40 MG Oral Tablet; TAKE 1 TABLET BY MOUTH EVERY DAY  Fenofibric Acid 135 MG Oral Capsule Delayed Release; TAKE 1 CAPSULE BY MOUTH  DAILY  Fenofibric Acid CPDR  Isosorbide Mononitrate ER 60 MG Oral Tablet Extended Release 24 Hour; TAKE 1  TABLET BY MOUTH EVERY DAY  CurrentMeds_4_twCiteListControlEnd     CurrentMedsSectionEnd  AllergiesSectionStart Allergies  Allergies_20_twCiteListControlStart No Known Drug Allergies  Allergies_20_twCiteListControlEnd     AllergiesSectionEnd  HistoryReviewedSectionStart History Reviewed  ZnjtdpgVasxtqtd60407110-41v3-90d4-4433-qnmq9raaxp22AfjaAkzrm CvasIpmiavt3Ubbvz History reviewed. LlghIqbetoq1Xgl XdoiUcftaam2Ghenf Medications and allergies reviewed. KsxxWpolehb0Fyh QmfqupjUyuktlmq00697119-00u4-58p6-3727-vmbq6lolid50RahgWqa        HistoryReviewedSectionEnd  ReviewofSystemsSectionStart Review of Systems  Eslxvggx9582en0h-09a6-056j-fp74-zc9xz82j6v2oDoibPxswg DlwrhBbumhhl7Udrgy CbbdmThqwlcx3Ees GjbvrGrmkwfl3Vvkyj ZqzzoBdbjepx5Vho BpapiNuafmke0Nqegz ZlvzoSbedgrv0Qce XgaqnGlystfj3Cdqbs SuqgpUrfhfbn1Cmr ZjdvlYldvuxa46Mevbs PvxadVxaqewi99Csi HuqtzPpatnws52Qamoc PsnjtZwgmpfr16Qqp WhnkrDcakaug18Qhlet XqfqyIjmcoio81Ztz TvkpfDhdoenj87Giwvs PqgmqPcrvsyy08Dvk RozrkWbtgdru87Sxtxx EreauAumxzpa78Ffj ZvojgXyvteiv6Zpiao MoirsBjrkkae3Zwk YdoacGwroujd5Lujwa PahjaDejjbye7Usb JtohoIukumrg1Nohgu IjiqqUbqrlpx9Mjd NzitlWcdeebc5Wgtwq OyumuHhcicrj8Ufb ImvrxZnremko77Slara QyacvCbmuvbb17Mag ElmvzWkgtmvv27Wgyov   Constitutional, Eyes, ENT, Cardiovascular, Respiratory, Gastrointestinal, Genitourinary, Musculoskeletal, Integumentary, Neurological, Psychiatric, Endocrine and Heme/Lymph are otherwise negative. DsynkTihoyjf83Oln Vmokqyjb9689kb3k-55y7-391f-au93-bp4ay97y7j6sNuuzNhx        ReviewofSystemsSectionEnd   VitalsSectionStart Vitals  Vitals_110_twCiteListControlStart Vital Signs    ** Printed in Appendix #1 below.   Vitals_110_twCiteListControlEnd     VitalsSectionEnd  PhysicalExamSectionStart Physical Exam  Constitutional(Brief)OAAJZ6x0m906e-8b19-744l-48x9-8qr30nws9g71FthsOqcbx VStart Constitutional: well developed, normal appearance, well groomed, well nourished, no deformities and no acute distress. VEnd GbnwaBtpknjf2Yivpz YwmxzFzcostl3Dgw MlsegWyhfhea0Lpkjd RcwznThvrzfa9Qxj AliweUssbxpn8Yqwra QqypzAftvofs0Eps JuiwjYbxjsoh6Cyoji JvobaHgpjmlh9Awy Constitutional(Brief)IEBLW1a4g772f-2b05-031t-30r4-2bv32eva0t45PahsHbv Eye(Brief)[id number]   VStart Eyes: the conjunctiva exhibited no abnormalities and the eyelids demonstrated no xanthelasmas. VEnd VkahcNdcrxng0Blwkn AwvsiTcaetxm4Lni Eye(Brief)[id number] HEENT(Brief)OSOPS083q4o2c-90re-071n-j488-tb3w17465963VpiaWemgm   NfknyOygyzwl0Nolrm HEENT: normal oral mucosa, no oral pallor and no oral cyanosis. WovpsOlhxiwt4Tmu ZpaocWbxvqou5Twxuh DujpwFstthih2Yuu XhzaXbnxZowkf3Urixi XnljUekxHfkzk9Pyb HEENT(Brief)ALKDB384z4g4x-82db-653h-t767-fj9z56421929IpilYug Neck(Brief)[id number]-e7f4-to799456242iNumlQuida   JsfztBpnrpvd47Fvdgs Neck: normal jugular venous A waves present, normal jugular venous V waves present and no jugular venous lema A waves. ElljdBscpdgx31Des ZuddwSnqhdze8Yegfs PtsdbMsxwskj6Cqi Neck(Brief)ENHFJ771fy229-n78n-7qjy-o3t8-[id number] Pulmonary(Brief)JTLGMajr982f0-8zwm-75a1-98q8-u6d3123adzalWqqsRvqcs   ImjigOjrtzfj65Tkyak Pulmonary: no respiratory distress, normal respiratory rhythm and effort, no accessory muscle use and lungs were clear to auscultation bilaterally. EccbuCxdkaqy17Xki JcdwsBixmzuh18Dzmla OfgicSrklitp78Ozc KvqzmFpqiusl93Ubdpa JrpdxGndplyy14Mgn MjqydZldpkfz78Ygqis ZzfcpWkmgibl06Ycc QgxzzOwfmvmn61Ijzft JknnxJpmxhyq40Pza YosfoLvoafdo86Kcord QtdhoVhjvhcn20Kwd AjsfeWsgtgcl99Gbyoe MhsfhRtwxygp51Ike QaevyRvkiwzm9Dhyrm EockzRaovmfh8Fbu Pulmonary(Brief)COFIWhzt203z5-2sur-27t4-58e8-p7m3373pknrcActbQbo Abdomen(Brief)TWXCV28ec40u2-97g6-3td0-18z6-kxhv48q04w3zRedwXstun   VStart Abdomen: soft, non-tender, no hepato-splenomegaly and no abdominal mass palpated. VEnd AkfurEwqcwji2Iegmo ZfqucVnhglen3Gar XzfvsFagzjbx9Akgpo PsnstLhcwegm6Qnj LpaqaVovukbu9Moxrq JipwhYiqevix5Pwt CbcnaWkzwsko9Cgfbs SbsiwNsqevkp1Hsz DshgxHvnadcj7Gmxbs QngheDquyuwc1Zvz XhidpBpazvks9Laury OptetGgeuhay3Pxi RqxuhYksyvaz0Msduj FzchoGfscqdy6Agd Abdomen(Brief)XPPPL29ef64j3-24s5-2gn4-17o3-dbua39i48u2vLcvjCqc Musculoskeletal(Brief)PMAZG284g4198-7oog-3134-c4m6-t060w557qpl3JxutDcdbu   FksnsDvjpsou5Fwaaj Musculoskeletal: normal gait . the gait was sufficient for exercise testing. VmkwcZmjkawq3Bqp ZqpxwMuweexh1Ipnca TurxaYutflve3Lwl IomlrSuughoz7Blbhr HfjnjEkzocud0Fqv Musculoskeletal(Brief)KGDYV264z9465-9myn-7575-p5w6-o610u396rbg1PfvjCau Extremities(Brief)YHOAL3369b8ee-0f2w-3xo8-7349-56870y095032YsnmIgqzd   ScctkLbijsix6Zfrdx Extremities: no clubbing of the fingernails, no localized cyanosis, no petechial hemorrhages and no ischemic changes. UyqqyJrzulmd9Jcz UckxqDkelxng1Xviwx JapsxHgrrtyb7Kzz WszysQvycmpw7Zqhpe BjlseQkcqzyj3Hgd AxirhBtuspcx5Xxhwt QskbvUkxhefq2Trx NwaflLfbfgfb5Ikbnx HtmkvNehuvqr8Swa LymeuIkjjuph8Pwdjh NoayrGenoomp5Uys MwaezMzshbzy1Dvjde IjtyjNwlvgps3Jod ToeexMtkxaca7Skisn ZxcikRdiwxtw9Ygb Extremities(Brief)ZTKZG7875p7aj-8z7v-2bo0-3796-07391c864358MgpbDdt Skin(Brief)UVJLZ6zb63uh0-1u8p-071h-g3va-4mcstk74j9ljKvrkTamve   MgjgkFhohkpj3Unxev Skin: normal skin color and pigmentation, no rash, no venous stasis, no skin lesions, no skin ulcer and no xanthoma was observed. RutugIuzjmsm6Iqt XdmodOnxamhk9Yfwsh ZbpyyIkmcirw9Zja VlfelHxfpzfx8Klhjn QuvyjXushikf3Zbu IvnnoAlsvnrx3Hsufs BxbplPxjtaqb7Ewb OgwyyYipemwo7Aakbc FekwcQoijjbu0Ety TtyxvXmnopyx9Ifdph EysrxXhhpqmz5Ebn Skin(Brief)SJAJH8zt85ts5-4z5p-847w-e0ck-3tyrjc71g6tkVjcwNhm Psychiatric(Brief)YADRBc619759a-c0m5-1623-r843-y2ap19lf6227SkwtIdeez   FdgmrUyqhywd0Ehygm Psychiatric: oriented to person, place, and time, the affect was normal, the mood was normal and not feeling anxious. XcjecVgemusz9Enf MuzykQbvegyk3Ujmts NcqodNkdtzsr4Mqe EavktFipsoil9Gknen YjjxkOvollkb0Lsv IivvzAglsjgr7Iibyo QlwhvVadpsfz6Ckh QqkfnSuvpawc4Jufnk XzqjbMzxustx9Fls FczheUdtmzha1Dhwaf TfgerNtaypte8Qch BwveiRnhxola5Uqqlk YcvvcNbusshp2Bfm MlvanTpuxzsu13Xcjht PnjyzAgfepix43Mzv Psychiatric(Brief)HRMEJl419819e-p5c2-7876-m436-u7eu71ir0380LkbeJpo LvwvinuBFIYQCkvqdtb67nsx-o0r5-958j-992l-8t900m3m57p7EfkfVbxgm   TedgfYckspbi30Xkcpn Cardiovascular: CotwpLskynsc65Gzl QliymUnwybda18Wzjxm VsegfZqjvhhy04Hcf DbjtyUonyyuw68Cetkn The PMI was palpated at the 5th LICS in the midclavicular line. VbwbgXtdsdml14Uye FpsixDysdoel0Fptvz The apical impulse was dyskinetic. JqqofQuevvui3Yyv CsiltChauotf05Tplvq a precordial heave was noted at the left lower parasternal line. CxskcHwbpzpf68Wga XsnmjIvbwmkd4Lyegg NjqmoScsfntd8Zoa NqicjWfxmfyq5Wvtah The heart rate was bradycardic, 52 bpm. XeoegIidwvop2Hma NfhezVkhbgty28Xrgru The rhythm was regular. MluyqMzcpcgl57Rlb QryzqMneqsxh9Vcyoy Heart sounds: abnormal S1, normal S2. EbogwZfpnmqg8Mai [id number] [id number] EfqbgMexgtfv65Lyjmg VyipsBdvjgvq63Xlk ObbqqPzeimsu4Kzlzk IocqjTgsltdl7Uvr RhoooIcoacsc03Bradr ImiigNmandqw31Xly HdkuwErbmhsr41Shqya WhfuqBctoekj48Sdw BhypdMsgkhto85Ruezb QinslNvrefff35Ugh HkywiUbvlvrg3Qktrd DklibPjagadp1Zpf YhvdiRdlyaqi43Qnfqu UireeConrfda60Ahb JidulMjolgdt74Txlwh   A grade 2 systolic murmur was heard at the [facility]. IzgatNulekuc29Sav ZwqycOinkvjn34Zqaqh HkrmsKvjytvs63Uqw VixinKszsjua77Kksut MupjlHguvfen89Zxu FgqeiPnsyoyy19Relrq IjvinFfxommp59Nhw AarbuPozpxyg11Ldypz HjillNqwtjoa23Mwj OqqgnHlxucwa34Ijygi CqrgxCwzryzq43Yic YewyrPuftkyi84Bjdbt HyvqkAjsoqvw26Vwp WkpazSemiehm77Crevy WstuiRmzpuru28Pjo MvlobZtjuind25Xhrjt YppftIezvdfw60Ieb IizvqAnnsdye88Zwltj TqaylRrdojxe99Svx WihveXjtezmv69Vinnr ZlxtbDpcvbsu20Wop HdcqtCokcfkv04Zdgtl QebykZiayomu63Gyo QyvmlMnehtjo22Jxthn DetjjJprzdcl65Xxo UfylvYaqclkx91Ttdhk HrlleSnkslyu74Viz GcosbHddmsgo53Dxhyd EohqiDlqwplw39Zyj WfuhnSdboxyt37Wvkyd SmpmvYumntti62Tqq YatmdNpqjwpn14Oecoy QfahcZyxqucv28Ixt ZkvkyWztxafc69Input   Pulses: no bruit heard over the right carotid and no bruit heard over the left carotid. NihzbLubaspi26Dxk OrpfaAfuzggd49Qhevi YlgzsNhoilze77Xir SecdsAgjuffr43Jwusn GtizrOavfmsy02Jhr OopauVqoirrt28Sneph MjmfvQxxtcyg07Fkx MpcwoErmtajq98Ixhhg UojxdWejkaby19Shl WgpjbEcspugz89Ofwln FttjrUwncoif70Oqm AxrgnJbhmyet84Fiiay Femoral: right 2+ and left 2+. JeqocXjyfyrr71Ebf AjccqMtkozjl86Iitmt UiiejUphpzkl15Ayt KvrgyHkbtnou81Rsfyo NpmyxGmabvck82Ubm DtscvZwdcqhx89Tsics GbhhsPxftejk92Nkp FccelNhwazji84Vaolu CaznnJntzzrp24Xvy EhuodVikvexs04Tyfud UiwyqEdrbuoe92Dhh NvjlgCtwkcji81Vfufj Dorsalis pedis: right 2+ and left 2+. JpicmNumjnmf28Ddx EjeafWewtmyi44Biqnn VohctPiugmuw19Lvo NwsjjDfriivv63Xmgbe   Abdominal Aorta: the abdominal aorta was not enlarged and no bruit was heard, no bruit heard. UipbyTznhhff71Exx RrflxVtbxzhn31Djauf   Lower Extremities: no varicose veins of the right leg and no varicose veins of the left leg. QtigkCyxdtdj71Sbf NwmbrMfdtpdi32Uytfm QaeqxIukykgg92Nfu EmiexClfcnso03Zbbiu NhpmuDgywugy84Hoh AgolpKjejvps49Vpzaq EubmjKiblpar30Cba KguugOqsrgqx49Wfdnd YhoirKrnkeqr94Ceb RydriDisuvnc81Dimcm RcrxoDhwrtun06Lmw GbbgzZbufgnt19Guqdl BrwwdSqpvdls87Tmx JbtecTegwwsk00Jlatt QwtloComkebk79Cnm WghxvkcHRJCUFmwygqh81qca-f9s5-731z-209u-1e065z2l92s8AqmqVgq        PhysicalExamSectionEnd  AssessmentSectionStart Assessment  Assessment_80_twCiteListControlStart Aortic stenosis, moderate (424.1) (I35.0)  Bilateral hearing loss (389.9) (H91.93)  Chronic diastolic heart failure (428.32) (I50.32)  HTN (hypertension) (401.9) (I10)  Hyperlipidemia (272.4) (E78.5)  Systolic murmur (785.2) (R01.1)  Assessment_80_twCiteListControlEnd     AssessmentSectionEnd  PlanSectionStart Plan  Plan_2_twCiteListControlStart Aortic stenosis, moderate, Chronic diastolic heart failure, Hyperlipidemia   Complete Blood Count w DIFF; Status:Active; Requested for:2017;   Comprehensive Metabolic Panel; Status:Active; Requested for:2017;   C-Reactive Protein Cardiac; Status:Active; Requested for:2017;   Free T3; Status:Active; Requested for:2017;   Free T4; Status:Active; Requested for:2017;   Hemoglobin A1C, Whole Blood; Status:Active; Requested for:2017;   Lipid Profile; Status:Active; Requested for:2017;   Magnesium, Serum; Status:Active; Requested for:2017;   Thyroid Stimulating Hormone, Serum; Status:Active; Requested for:2017;   Vitamin D, 25-Hydroxy; Status:Active; Requested for:2017;   HTN (hypertension)   Echocardiogram; Status:Hold For - Scheduling; Requested for:2017;   Follow Up___ Outpatient  .with nurse practitioner  Status: Hold For - Scheduling   Requested for: 2017  Plan_2_twCiteListControlEnd     PlanSectionEnd  Discussion/SummarySectionStart Discussion/Summary  CardiologyD/[id number] YczvpIfdabtw9Pened CxfjxNsgyrim0Huz GfeldDmosian14Vmtnj EnnbmIuurqbo47Myl EuxavAdytexk7Ugmmj OnqyjMgdlrgx8Kii RkugyMpkghpq34Iiwjm AvsodRbhznhi16Kyj CfnuiJssbqsm4Vbbqz GmgbtRvrhzmt5Alw MxmmwEshdxwv7Psean DlsshXhbhsjm8Eip TjgivIvrfchc9Kjyhk ZmbatFwnfzzr6Fxk YamtnTojtjnt9Qgrxf BalplOqhlkvu4Uzm YdgpxSdqfxss9Qtytt OythqFuztbee4Sqa BeqliKalrhpm47Zftum   Coronary Artery Disease Discussion/Summary:   Impression: coronary artery disease. Currently, this condition is stable. Medication Changes: none. OjecpWmwqpvb83Som SdnflDkrjpbx7Zugkn KrnopPozyetn9Eux MvdsdIeevdcc47Vnkhd YbjvfKfmfkzy84Yoy TrxmfMrrvxmw6Xrthm   Hyperlipidemia Discussion/Summary:   The impression is hyperlipidemia. Currently, this condition is stable. The diagnostic plan includes a fasting lipid profile. MbnmzFesowej5Jvf MtcpsIqdlsfw73Nfxjq   Hypertension Discussion/Summary: The impression is hypertension. Currently, the condition is stable. Medication Changes: none. QjcfkKrzoxfx27Jcu GdelxZmcfhcw17Nfnhs ZixoqSgwawkv83Chl VahagMiewxmf23Poeuc GlemoQnagknb49Anu KbzzlCzgtqpa89Mhxuv SzwgePsuyclf02Frs KfmgiJuylteb29Aclfy   Pulmonary Hypertension Discussion/Summary: The impression is pulmonary arterial hypertension. Currently, the condition is stable. Medication Changes: none. EpafdKwlrcds70Pjc StuadVcoshky09Uzymh WmvvkHbdtwye07Pvd CilpdXwmpiie65Nvlqh SadywDtdljuy33Szp AxenaTijdmry74Legyw VifczEkqfnkn73Pjy ZjqtyZqerzbn93Pbhgz KtprdBwizajb65Ikh FnkcmFwudggq85Kenqt UaybbCmvylge88Lwi SptfGsrlEtvwg0Tguix ZsiyJkknDecet1Joz XxgmgOaduldp61Kkinj   The treatment plan was discussed with for 20 minutes. FklueNfretgp40Nyl LxfwVklyJsaet9Cvbgh BzgkDrzqFpldf8Yrf OwngqNpgcytf21Amktw   Patient is instructed to follow up in 1 month(s) with me. GzgjyDqxkksi12Opr CardiologyD/[id number]-0w5l-3063-60z4e34f64t8AoeaHng Bhdotdsvuz1374gmf-37ts-0826-014n-54l1a8o6d4ujEgisKothd   HytrxZjregyn0Tyuse Poorly controlled hypertension, patient denies any blood pressure issues. Will review his meds and repeat low pressures early next week along with an echocardiogram to reevaluate his aortic stenosis. Labs were drawn today. XhkmmTvrhtla0Via VssqeRuxgsax9Vowrm Chief Complaint:  Patient is a 89y old  Male who presents with a chief complaint of Agitation with fall. (2017 05:15)      HPI:  89 y/o Man withf dementia, b/l hip surgery brought in for worsening agitation accompanied by son. Son states for past 2-3 days his dementia/confusion has gotten worse to the point he is not making sense and unable to ambulate well. The son heard a thump upstairs and found pt on the floor attempting to get up; he had a mild head contusion, but no active bleeding. No history of seizures. History is limited due to dementia, pt required sedation for agitation in ED. (2017 05:15)    Allergies:        Allergies:  	No Known Allergies:     Medications    CurrentMeds_4_twCiteListControlStart Aspirin 325 MG Oral Tablet  Crestor 40 MG Oral Tablet; TAKE 1 TABLET BY MOUTH EVERY DAY  Fenofibric Acid 135 MG Oral Capsule Delayed Release; TAKE 1 CAPSULE BY MOUTH  DAILY  Fenofibric Acid CPDR  Isosorbide Mononitrate ER 60 MG Oral Tablet Extended Release 24 Hour; TAKE 1  TABLET BY MOUTH EVERY DAY      Past Medical History:  Dementia.    Past Surgical History:  No significant past surgical history.    Family History:  No pertinent family history in first degree relatives.    Social History:  · Marital Status		  · Lives With	spouse	    Substance Use History:  · Substance Use	never used	    Alcohol Use History:  · Have you ever consumed alcohol	unknown	    Tobacco Usage:  · Tobacco Usage: Unknown if ever smoked	    Review of Systems:  General:  No wt loss, fevers, chills, night sweats  Eyes:  Good vision, no reported pain  ENT:  No sore throat, pain, runny nose, dysphagia  CV:  No pain, palpitation hypo/hypertension  Resp:  No dyspnea, cough, tachypnea, wheezing  GI:  No pain, nausea, vomiting, diarrhea, constipation  :  No pain, bleeding, incontinence, nocturia  Muscle:  No pain, weakness  Breast:  No pain, abscess, mass, discharge  Neuro:  No weakness, tingling, memory problems  Psych:  No fatigue, insomnia, mood problems, depression  Endocrine:  No polyuria, polydypsia, cold/heat intolerance  Heme:  No petechiae, ecchymosis, easy bruisability  Skin:  No rash, edema    Discussed with:  PMD, Family    Physical Exam:  Vital Signs:  Vital Signs Last 24 Hrs  T(C): 37.1, Max: 37.8 (04-10 @ 22:04)  T(F): 98.8, Max: 100 (04-10 @ 22:04)  HR: 78 (62 - 78)  BP: 161/67 (138/71 - 210/104)  RR: 18 (17 - 18)  SpO2: 95% (95% - 98%)  Daily Height in cm: 167.64 (2017 10:20)    Daily Weight in k.3 (2017 10:20)    Tele: SR, first degree AVB, PVCs  General:  Appears stated age, well-groomed, well-nourished, no distress  HEENT:  NC/AT, patent nares w/ pink mucosa, OP clear w/o lesions, EOMI, conjunctivae clear, no thyromegaly, no JVD  Chest:  Full & symmetric excursion, no increased effort, breath sounds clear  Cardiovascular:  Regular rhythm, S1, S2, no murmur/rub/S3/S4, no carotid bruit, radial/pedal pulses 2+  Abdomen:  Soft, non-tender, non-distended, normoactive bowel sounds  Extremities:  no edema  Skin:  No rash/erythema. Skin is warm/dry  Musculoskeletal:  Full ROM in all joints w/o swelling/tenderness/effusion  Neuro/Psych:  Confused.     Laboratory:                            10.3   6.8   )-----------( 223      ( 2017 05:52 )             30.2     04-11    146<H>  |  111<H>  |  39<H>  ----------------------------<  104<H>  3.7   |  23  |  1.04    Ca    7.8<L>      2017 05:52    TPro  6.2  /  Alb  3.4  /  TBili  0.5  /  DBili  x   /  AST  34  /  ALT  21  /  AlkPhos  80  04-10      CARDIAC MARKERS ( 2017 05:52 )  2.100 ng/mL / x     / x     / x     / x      CARDIAC MARKERS ( 10 Apr 2017 22:11 )  .612 ng/mL / x     / x     / x     / x          LIVER FUNCTIONS - ( 10 Apr 2017 22:11 )  Alb: 3.4 g/dL / Pro: 6.2 gm/dL / ALK PHOS: 80 U/L / ALT: 21 U/L / AST: 34 U/L / GGT: x             Urinalysis Basic - ( 2017 02:45 )    Color: Yellow / Appearance: Clear / S.010 / pH: x  Gluc: x / Ketone: Trace  / Bili: Negative / Urobili: Negative mg/dL   Blood: x / Protein: 15 mg/dL / Nitrite: Negative   Leuk Esterase: Negative / RBC: 3-5 /HPF / WBC x   Sq Epi: x / Non Sq Epi: x / Bacteria: Occasional      Imaging:  ECG: SR, first degree AVB, LVH, 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    EXAM:  CHEST SINGLE VIEW                            PROCEDURE DATE:  04/10/2017        INTERPRETATION:  cough    A frontal chest film is severely rotated. Interstitial markings are   prominent    Heart is enlarged. Vascular markings are increased      No mediastinal or hilar adenopathy is suggested. .     The osseous structures appear intact intact.     IMPRESSION:  Severely rotated film. Cardiomegaly with congestive changes.    EXAM:  CT CERVICAL SPINE                          EXAM:  CT BRAIN                        PROCEDURE DATE:  2017    IMPRESSION:     CT BRAIN: No acute intracranial bleeding, mass effect, or shift.     Volume loss and chronic microvascular ischemic change.    CT CERVICAL SPINE: Diffuse osteopenia without displaced fracture.    Grade 2 C7-T1 anterolisthesis due to advanced facet degeneration.    C4-C5 degenerative cord compression and at least moderate canal stenosis   due to disc osteophyte complex and OPLL. If no prior imaging is available   for comparison, consider nonemergent MRI cervical spine to assess for   cord edema or myomalacia. Clinically correlate for myelopathy.      Echo from 9-11-15  Conclusions: FrtznIpzadoi99Fvq WyljpHxwodle370Kmwhr   1. Normal right and left ventricular systolic function. Biatrial enlargement. Grade 1 diastolic dysfunction. XiyrlEhelffx764Bzh HxdbwFrzucin018Fdwvm OjfwbFofhvta810Izu BiqkzGlvmwsm027Lcyaf   2. Moderate tricuspid insufficiency. Mild mitral and aortic insufficiency. Mild aortic stenosis.   3. No pericardial effusion. EudzkSyjmnkv950Krp DatnbHbzelcf302Ivjik QzwknLegcvdh290Nww BpbqxRyroeof486Gueuy FrytcYlekspk906Pyi VgsjnQctsggn670Zbhzp UemanJipnwhv876Shu BswvrQrtwaig732Yliet 4. As compared to previous study dated 14, no significant interval changes are seen. LeipmPdwfoeh084Mjm Aiauvnllhzlhnpzk3o9b4a-6l1r-9v5y-3d0m-fh3u71yx0n6nWcmeHgj      Echo from 2017  EF 60-65  Mild / moderate AS with mild AI  Mild TR, Trace MR/MS    Assessment: 89 y/o Man withf dementia, b/l hip surgery brought in for worsening agitation accompanied by son. Son states for past 2-3 days his dementia/confusion has gotten worse to the point he is not making sense and unable to ambulate well. The son heard a thump upstairs and found pt on the floor attempting to get up; he had a mild head contusion, but no active bleeding. No history of seizures. History is limited due to dementia, pt required sedation for agitation in ED. NSTEMI vs. demand ischemia.    Plan:     Tele monitoring.  Con't with:  aspirin enteric coated 81milliGRAM(s) Oral daily  heparin  Injectable 5000Unit(s) SubCutaneous every 12 hours  sodium chloride 0.9%. 1000milliLiter(s) IV Continuous <Continuous>  OLANZapine 5milliGRAM(s) Oral every 12 hours    Supportive care.  Braxton Lafleur MD, FACC, FASE, FASNC, FACP  Director, Heart Failure Services  [facility]  , Department of Cardiology  [facility]    Last office Visit Note from 17:    Reason For Visit  YuueeptawzTWF6ljk7gg8-6elx-42l0-d930-70t46w78dx84VotkIjibq YlwpeEqvqmgw1Vvwzb ROLY CASIANO is being seen for a clinic follow-up of coronary artery disease and Heart murmur. NiojdApucrjk6Scc TknrnNcohcdg9Lsmkx WbiyuWmiutwe2Mln RkzlBdxwthb51Maabd BwbtRpgvmuq89Wkx LawvdyIcdgwau82Muqph BgvvgeJatpjrn39Rpu UarcqcdcllQCK7pqk0we0-0ksv-18i8-z352-68a78i00iv52BdprTgs        ReasonForVisitSectionEnd  HistoryofPresentIllnessSectionStart History of Present Illness  LniwtrvjeCSIen845831-l06b-2nua-zzhb-314794t9r664JlhzXjyhn WevtEhznCevve0Mxvid 89-year-old male followed for history of coronary artery disease, Also followed for history of diastolic heart failure, hypertension, hyperlipidemia and aortic stenosis.   Roly has become quite hard of hearing and it was difficult to get an adequate history. He claims he is compliant with his medications however he can't recall which medications he is taking or how many. He denies any recent chest pain or shortness of breath. His blood pressures, which have been somewhat poorly controlled in the past were extremely high today.No recent labs have been drawn. SijgUiqkVwdpd5Xrl LzmesznkoTXPxr535178-d34t-8yuz-kpif-145160w5t193XcvnOgx        HistoryofPresentIllnessSectionEnd  CardiologySummarySectionStart Cardiology Summary  IkgydirgboVcnixuae847l7zp-rj99-583m-f119-k4t331o05636YjlbJlymb IaxwbAdmlfcj8Yfrfa   EK17, Sinus Bradycardia -First degree A-V block Voltage criteria for LVH (R(I)+S(III) exceeds 2.50 mV) -Voltage criteria w/o ST/T abnormality may be normal.  -consider old anterior infarct.  -Nonspecific ST depression + Nonspecific T-abnormality JfohcLamwmaj3Qze JttudVeofcne50Lcvxw   Stress Test: 4/10, no Ischemia, no exercise induced arrhythmias, no Symptoms YrjveXtcfwfj72Lth IhytcSsmilzy59Wgxsv   Echo: 14, Mild AS, AI, MR. Moderate TR. Mild pulmonary hypertension. LVEF 50%. 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 WzumpJkrkkbc3Muq RdgreTmisnaq26Yzohn NqqtyGybpdpj82Gpi AhniuDrsuxhx02Bnvav YmwicCshyimg68Ehl KkcuiFvmmfgt42Ebanx PiuatIdkqhay41Udp FgunoKrocfgf82Vjhjm VycdrCfdbteo92Fyi OoobrUbnthtv63Rezij CcrkxJjaente22Uwy IbdxvUnbztch74Sghin UlazeQoqwdrg85Blr CzrpmLeixytl04Fqmzy DjgoxLhteejc75Btd AljpfXzsdjxs74Pwiwm YigewZphesyk96Yps BthboJzvgpgn53Sofrt MmswuGlzrtnw63Vxr ZpchoAporjrl32Cramh AltljUqhbeaw38Jpp QfjpcDzgpxjv06Rhcbc GqpimDfzzqex47Dlw PtiwodrdvpSrqkpvbj350s8da-vp67-741r-o522-o8d219d48415DqbqFhn        CardiologySummarySectionEnd  ActiveProblemsSectionStart Active Problems  ActiveProblems_20_twCiteListControlStart Aortic stenosis, moderate (424.1) (I35.0)  Bilateral hearing loss (389.9) (H91.93)  Chronic diastolic heart failure (428.32) (I50.32)  HTN (hypertension) (401.9) (I10)  Hyperlipidemia (272.4) (E78.5)  Systolic murmur (785.2) (R01.1)  ActiveProblems_20_twCiteListControlEnd     ActiveProblemsSectionEnd  PastMedicalHistorySectionStart Past Medical History  PastMedicalHistory_20_twCiteListControlStart History of Elevated cholesterol (272.0) (E78.00)  History of H/O cardiac catheterization (V45.89) (Z98.890)  History of H/O total hip arthroplasty (V43.64) (Z96.649)  History of angina pectoris (V12.59) (Z86.79)  History of sinus bradycardia (V12.59) (Z86.79)  History of Osteoarthritis, hip, bilateral (715.95) (M16.0)  PastMedicalHistory_20_twCiteListControlEnd     PastMedicalHistorySectionEnd  SurgicalHistorySectionStart Surgical History  SurgicalHistory_20_twCiteListControlStart History of Total Hip Replacement  SurgicalHistory_20_twCiteListControlEnd     SurgicalHistorySectionEnd  FamilyHistorySectionStart Family History  FamilyHistory_20_twCiteListControlStart Family history of Hearing loss, unspecified laterality : Father  No pertinent family history : Mother  FamilyHistory_20_twCiteListControlEnd     FamilyHistorySectionEnd  SocialHistorySectionStart Social History  SocialHistory_20_twCiteListControlStart Former smoker (V15.82) (Z87.891)    Never smoker  Non-smoker (V49.89) (Z78.9)  Social alcohol use (Z78.9)  SocialHistory_20_twCiteListControlEnd     SocialHistorySectionEnd  CurrentMedsSectionStart Current Meds  CurrentMeds_4_twCiteListControlStart Aspirin 325 MG Oral Tablet  Crestor 40 MG Oral Tablet; TAKE 1 TABLET BY MOUTH EVERY DAY  Fenofibric Acid 135 MG Oral Capsule Delayed Release; TAKE 1 CAPSULE BY MOUTH  DAILY  Fenofibric Acid CPDR  Isosorbide Mononitrate ER 60 MG Oral Tablet Extended Release 24 Hour; TAKE 1  TABLET BY MOUTH EVERY DAY  CurrentMeds_4_twCiteListControlEnd     CurrentMedsSectionEnd  AllergiesSectionStart Allergies  Allergies_20_twCiteListControlStart No Known Drug Allergies  Allergies_20_twCiteListControlEnd     AllergiesSectionEnd  HistoryReviewedSectionStart History Reviewed  MirfpowCmhhphqv56489399-24o0-14s2-9459-unyl5diims90FrjzFopvq NpoxOuzbdie1Oqkqy History reviewed. JpfkJxqgjko2Mgj IewnZhqgvvh8Sekef Medications and allergies reviewed. IajlRaxvzse3Gam LcbpulkIqkuralw41720619-25x5-32h3-3177-ggly3exean39DdvjClx        HistoryReviewedSectionEnd  ReviewofSystemsSectionStart Review of Systems  Otshjexs3758at6w-28p6-413a-ta15-uu2tt29s1h8pSvvyZoprl ZdyzdXrifhbg5Kdsyv DbpsfMilkhot5Qpr CheurTdtgqxt8Jrihk IyxbcPqzbmez3Pam NcxpnPijcukq9Iqycy DufsfPtzttzv9Oui PhifyLwosnyv3Gzgsh YioqsMsazorq8Zsp DmwfzLgsewfr74Tjgmo PryawRxujfxd19Run AdjfoXxtihnh70Tnwya DtgshPgtwhkr82Tmd TattjIdhwgwg39Zvzhj BhmzjMmawqwj68Ndc EsojlXkqxwam30Nuzhj PktllRsltejt05Cys XlldaCzpldal48Xxoxr DrwsrVrccsps59Imn XhlvyMjnobnw8Lfuec KjnnjAwrwzvi8Nvi UtlrcCskidcg5Vkuci WegmgYydhipi3Kgr SvzoaJcjiemo0Prtzm ZdotoGgvqfgc5Jgk CkdzjKdzhluk7Yenma XsqgvSnickix9Ggr BzmeuEklorjh06Brtao MeapdNuourgh65Jza SnylfGpiudpr06Pdcgh   Constitutional, Eyes, ENT, Cardiovascular, Respiratory, Gastrointestinal, Genitourinary, Musculoskeletal, Integumentary, Neurological, Psychiatric, Endocrine and Heme/Lymph are otherwise negative. CtwzzPctzovg18Yjp Ssbrwxki6646sy3t-76r4-843h-wc65-le1ky43s6n1aWxvpSjs        ReviewofSystemsSectionEnd   VitalsSectionStart Vitals  Vitals_110_twCiteListControlStart Vital Signs    ** Printed in Appendix #1 below.   Vitals_110_twCiteListControlEnd     VitalsSectionEnd  PhysicalExamSectionStart Physical Exam  Constitutional(Brief)FSISQ1j0o787f-6a35-554p-47u7-9wm97jik0o73SmljTgecj VStart Constitutional: well developed, normal appearance, well groomed, well nourished, no deformities and no acute distress. VEnd BsqgkFhoygqy6Wktqu GarrlDtwlzbo4Fnx GrdtrUbpgzsf6Feyja OavoqZnopshe2Kwz LdegqXotswwm5Liplk UabfiDugelet4Ceg CooodHktaibw7Aysmr ByrrvPsfkkay8Cdg Constitutional(Brief)ZYBYD1v1o666z-3v56-438y-29z5-6ej78uok0o04HkzwAtp Eye(Brief)BGJZL07561677-404q-9002-q224-922i54r7uh5oKholFdjaa   VStart Eyes: the conjunctiva exhibited no abnormalities and the eyelids demonstrated no xanthelasmas. VEnd QcozdKqnchlu7Uvehj PccglZjweejd0Xzc Eye(Brief)YXQFP10675376-964y-4922-n015-739v46a5by7pImdtRdr HEENT(Brief)SCYLJ320z8u3r-01zo-811e-h116-nm6h86615265DmkfHjaos   NdhqzRhnvbqm2Ntbrs HEENT: normal oral mucosa, no oral pallor and no oral cyanosis. SnillKjgaeog8Zvn GdznwAsmuazy4Rqqsy IfzpaXtbgqiu4Tcd GegkGkslVupea7Qklyr YruhJrcjUpjuo2Rhn HEENT(Brief)XBFBX847m8y3u-55qt-938e-u445-at4i08350846WnubNss Neck(Brief)QATTM316rt804-w04x-3kmn-y3z8-nd168715015jTvngMqqto   AajztCdxvukw44Xpajj Neck: normal jugular venous A waves present, normal jugular venous V waves present and no jugular venous lema A waves. CpfmiVyjmtbu25Klu KjnvkRlnubzi6Ydxrk GzkeeVcrlcpv4Kmc Neck(Brief)TWBNI391vl286-e37q-0bfo-g9d1-nx818380586pZqwaZru Pulmonary(Brief)JSAJGpiy558n6-0qgo-21s7-03h2-w2b5942ozdxzEwpwZnbxi   SrmxfKurjpcu51Cfekc Pulmonary: no respiratory distress, normal respiratory rhythm and effort, no accessory muscle use and lungs were clear to auscultation bilaterally. OhexcFpnaweq21Pcs ZuaqiXawbvac60Gdszg UeucxWultkha51Dxh JflbeBwfzhjf84Mxxoq HnnokPrlynmy77Byu KlfkiFpgospc71Kbzwz BtguyBjrnotv22Yhu KaasaAncbsab49Zdtxg EusnxRxnxlyl04Gjj FqbdcUwvlqfc65Iiepm SrkgsMvqbmkb31Iyf FxlolItufxjq85Rtvtm JhzwiPupeoem99Hlf AeukfEimhdqo6Faqyf XdgatCqtjkhr8Ypu Pulmonary(Brief)DQELQfwo720j3-3irg-16g0-74h1-p0w6215fjiurDlhjTbr Abdomen(Brief)FPACX18qf60j7-25t0-1js0-41w5-iron77e66u1cVwewJqfrm   VStart Abdomen: soft, non-tender, no hepato-splenomegaly and no abdominal mass palpated. VEnd SucibBnkyoth0Mutdx RxcgaAnqomwc3Iyj AdsldFfofpzc7Jtkbu TwrdqUputgwt0Jex XkwifJrvwwas6Kriie YsbqvAsqkkvt5Fea GxwdjJepvomp4Bghra ZazidIfpgnoq5Yaw BcgygZwlpcdo4Asliw AncuqByjlguv2Mpl UjelxUxexkjy4Wfkof FhawgMqxwmjb6Ltc TjcatCqxtxqg1Jrfyn JbcwtSofqsqm3Srz Abdomen(Brief)UVHVX55gc87k3-60g9-1va9-55s3-jgto52p27g8kJrqtBql Musculoskeletal(Brief)BFYJJ559m8607-4qji-5977-f2b4-g854x586bhv0HgzeAabgd   LkpfjFtgcvcu3Dlagc Musculoskeletal: normal gait . the gait was sufficient for exercise testing. QbtfbJmtwahh1Ycy AsujsIymblwd4Leewi EwtkaHgoaqre4Irc MzhmuZujbmlh1Ewclk DxmecWejfmbg4Vva Musculoskeletal(Brief)ATCXQ219f1995-7ctz-6467-h5m7-t008z785frq5KcfcRmx Extremities(Brief)ZLBMT6076f0zg-8d6o-8xh5-4666-90297f602999UnwuDdkuk   AmlohZeyajqu5Rnenv Extremities: no clubbing of the fingernails, no localized cyanosis, no petechial hemorrhages and no ischemic changes. LxvnyEjqtaof5Qej AorczFtvtqiw7Uedrp OvqhyDqhfqxx6Bnf SgkwjCrevqdj7Hbyls MvqooEvrkcxj8Emg QxfhbPlmufgh4Paeeg RtsdnSzkkphh1Yfp HlsldWatymbu1Zoyay KjloqBnmhgvf0Zpv SqpncUbhisbt8Pdvqd DffewDgbkbek7She QztifKylrykx1Rylbc VtxpoGbfmcdw4Dex AaospSqykcwm7Abglf XjmwqMqjxzuy2Lyo Extremities(Brief)ZSKFJ9657o8kn-0y5h-0om0-8026-32535f870731IzwmBen Skin(Brief)UYVRP8ry07yu1-8x0j-927d-r6rm-9lsltj76d1njUjmkCffwh   HktcyGxulsfb6Sqkrx Skin: normal skin color and pigmentation, no rash, no venous stasis, no skin lesions, no skin ulcer and no xanthoma was observed. WrxnjBnbdzmv3Mdy ElmgzXegpevs0Cmnec HczzfYtiwzfb9Yqo AlvezGbxhzxx1Pzxrb XksucSroxbme6Het UlvpsLexsscc4Gigfl PhmseGrctlqa2Ife WmhxzXpykjyo2Eppwu PikfhTbpkcqk5Bfd UbgdqNozsrkw5Hurmo UoswrXajojyl5Cnb Skin(Brief)XAKYF3rm37hz8-3i1h-634c-f3vf-9wsvce55d0ccPaxkYwh Psychiatric(Brief)DYZKCb025469e-u2w5-3325-c522-q5gi19he7957VfsjFkcyf   DozmlVtsqrnz3Stxtd Psychiatric: oriented to person, place, and time, the affect was normal, the mood was normal and not feeling anxious. PgzijPqrldkq2Mvl CsivbHklhdaz0Tfpnr UfvrkNumlcuw9Gau IdibnNqvodyc7Tearu SmmkoMgisrlq9Wrd WlkfeUvkzzjf9Cvooe GaytbYmrvpps5Wuh XnroePvqhlhr5Xehao JriheEjgqddq4Kdl NsbixTlwvohf2Xexhb GboukTqusjms8Bmn XeghwFcxsesa7Zrtur LarryNjaxpdg9Twh VnjutZezmwck04Nrzfc VkawzMgaffit22Bvo Psychiatric(Brief)UOOCNi499011h-d3l8-2740-h162-p7pd16hj2978KvqzUly PsocymeYXHILPigaomd02moa-m5c5-560i-626e-8c246g7b20o3MsglQyims   RlnsrPufrwox75Ucsbe Cardiovascular: EzebbMnxetbw19Ucz RraupWcjjnyt46Cmtyd LlrpjWlyhzss23Udk NefceLtaklzj20Bcmpy The PMI was palpated at the 5th LICS in the midclavicular line. CwtpvUmsctln92Lis QkaibRxqwfkp9Hqdee The apical impulse was dyskinetic. VuiizHdeapld8Noe TbxwnYfpgcvb60Hbdnm a precordial heave was noted at the left lower parasternal line. BswzyQtqycxb28Fwn NhbunEyncqha4Rpdbd HrvdiInbiukf6Prh UpmaeBhxcpyn4Yfiin The heart rate was bradycardic, 52 bpm. AgewuLhzvzqh3Eiy WdfjaBgboenj44Ahltp The rhythm was regular. InaacVxmojix32Tec TgmemFdgbsxz8Qxxcz Heart sounds: abnormal S1, normal S2. UjsghSpqhuna6Uhy [id number] [id number] FyadvVrzdfci17Dvdfl UwhrtAehnsoj90Jno TyblxRdqxwvf8Cuquk SgpygDkctaep8Rxw PplzsPcnkhdw77Pijpk LysxuNhbgwaq50Tgs IynawWhfkduc83Ejlew JetobVcpbgpy67Puk JikipTjcvsdc50Sgjaf FgjiuAqxtbds95Twr UjpmnGplrcyq9Tlyrn ZrrdzSlsyyui0Mqg DbjqkLyavesg89Cmkrz SvrceAfkpudj68Pco WqqdwXkcefvh91Kbmvq   A grade 2 systolic murmur was heard at the [facility]. ZbvuuOfuxpev97Yzi LkcatCjtebdo91Fyvtd PeoycEvinjko37Fvr WoiwqQqhvwpu80Nagnl AxggiTdlgxpv28Vjx XvdjqRqwmdvs92Bvmjo YnizuGsbqbqy42Isi DkbynHevhzro07Fawkk ZjfxwEvprdgd77Kfn QvvdjWpsimra31Rnazg RgaovWsupaka94Prj NbdswUfreasr43Dwlmo WgzraKncoexa38Vwe SbuowItzvdob81Pzdcb NiarrVxryrmg10Qsl DmtphRejyivx19Ztgky AyklcXluwbhu72Hvf JwwszNrxtmve55Uwuqo FabcmPtxwfvi17Lgm ZckyfGqjzlys02Dbjhd RgzmvIeugume69Tkk FmfhgMmqzvus39Fqqsj LlyceRothmcs44Yme GishuWuhapte85Uhqtw TtwmoBovtvhb53Nxb RypccPgvbqut75Mkpdm BdrucRsosqzh39Fgb PzecaChcdlsa78Jcmob FjesdDahrzxb40Lub QlydhYcgdtwf30Lkuij UgnqvWunwlgt23Inb AextxYfudgkr96Aeejx AmnosEfxwleu94Vmc BtzmzYblvene81Zeaoi   Pulses: no bruit heard over the right carotid and no bruit heard over the left carotid. NxqjdNokmlhc44Gah MhvhmRrldpch78Sshwh QckxuZkjskkl29Fmp PyrpjYjnjzqk08Lxlig BhkarTpfovle99Sjf VubqrNonfdgs24Tzmcr CgwgfCycyaln98Hvs JocmpGiuarwp92Hxxno DocbzFqqlnvu81Smi XikqiSajwbme98Xeiqa IygtxOkxgjqp90Nfa LywcxXiaptyh35Envrp Femoral: right 2+ and left 2+. XcpdmYrwawgv51Zpf RfuhiZupnfhm12Aoxme BxzrlAwlskmj98Nri AcjyuPfdqlnu36Eqrpb OzpnvQcgnirk88Brg KahvsKbrwquz25Yvtft FzojmUjlvzjq37Ugm ZdvrcAfynket16Kttri VcocfPwtwryo29Tpy KdckwRsmvcqv04Hntzm XqgpwOnaowks15Kzb WqhxzHinzjps99Szijb Dorsalis pedis: right 2+ and left 2+. NjfakYnqlpjt85Rxm ComvsWgwywnh11Jqymq LlhhoLffzpem65Evn VhjnpMjshrdz04Tbyal   Abdominal Aorta: the abdominal aorta was not enlarged and no bruit was heard, no bruit heard. ImoeiFutwrzm93Wml AnurjMwmboze66Cvfgw   Lower Extremities: no varicose veins of the right leg and no varicose veins of the left leg. UuxbdCqhwyev86Rfb XkjysFemvdsr27Fjcrx LmtepOrkljys17Lbd CscfaDxufayx06Qtgca DxlpaFfstwzd58Qae LtfvtEeqldyb31Ofgfu SjeobLhwnohu92Kww QugolWytcduz41Ybyvv GslzwYslzvjo48Vmr CtkvxZqpfnrx91Eusih QojgsVwppocv95Mey AavgdTchvcud95Gnytp OrhpkPvzinet67Siq IdepwGpkyqwz82Mctik VbyoiRohhydw69Ghc UvhlcozNGUUCSshfohw14ckz-k1t5-109z-174h-6i525v3l26k5YtaxZcp        PhysicalExamSectionEnd  AssessmentSectionStart Assessment  Assessment_80_twCiteListControlStart Aortic stenosis, moderate (424.1) (I35.0)  Bilateral hearing loss (389.9) (H91.93)  Chronic diastolic heart failure (428.32) (I50.32)  HTN (hypertension) (401.9) (I10)  Hyperlipidemia (272.4) (E78.5)  Systolic murmur (785.2) (R01.1)  Assessment_80_twCiteListControlEnd     AssessmentSectionEnd  PlanSectionStart Plan  Plan_2_twCiteListControlStart Aortic stenosis, moderate, Chronic diastolic heart failure, Hyperlipidemia   Complete Blood Count w DIFF; Status:Active; Requested for:2017;   Comprehensive Metabolic Panel; Status:Active; Requested for:2017;   C-Reactive Protein Cardiac; Status:Active; Requested for:2017;   Free T3; Status:Active; Requested for:2017;   Free T4; Status:Active; Requested for:2017;   Hemoglobin A1C, Whole Blood; Status:Active; Requested for:2017;   Lipid Profile; Status:Active; Requested for:2017;   Magnesium, Serum; Status:Active; Requested for:2017;   Thyroid Stimulating Hormone, Serum; Status:Active; Requested for:2017;   Vitamin D, 25-Hydroxy; Status:Active; Requested for:2017;   HTN (hypertension)   Echocardiogram; Status:Hold For - Scheduling; Requested for:2017;   Follow Up___ Outpatient  .with nurse practitioner  Status: Hold For - Scheduling   Requested for: 2017  Plan_2_twCiteListControlEnd     PlanSectionEnd  Discussion/SummarySectionStart Discussion/Summary  CardiologyD/[id number]-1005-12z4l25u93l8HbsdClguq ZdpbdXkqaccq4Qxmar AaavkIoqxcye3Uyx FblnsHqnfppo09Plkur UyyaqKlwjqqq27Dxl QqrtpTbqunqq7Aoqkd GfbkjHuaxxxu0Mnp XuxcxMofirfe30Jheeb ZhysqVxwdhwy74Hhe WumsgZwuiapm9Pnfuh UwjvrNktzqnq4Key VdekqZiacfgj8Mchwe QkmceIquvvdp0Rqg ObugqJnfwzds6Hgivn EyqxrZkyjtkl8Hgb PvqatGwcamlx9Jmcsl XcfggQvtggma6Cmc HtqbdQuztrht2Zknsm OexofKbvktis8Zwi VntfvNsbtjsn64Ppxph   Coronary Artery Disease Discussion/Summary:   Impression: coronary artery disease. Currently, this condition is stable. Medication Changes: none. HiaeyWfxjpsv73Nhy CvartTqbttwz3Xfwlg LsesgChcfrda5Zuv ZhihjEosanla73Bkmgn WtwwaSxlptlp46Jmw DvyecKmmmjlt8Graco   Hyperlipidemia Discussion/Summary:   The impression is hyperlipidemia. Currently, this condition is stable. The diagnostic plan includes a fasting lipid profile. IskbmYevjjcg6Gvg SrdvoPornhep45Dimsw   Hypertension Discussion/Summary: The impression is hypertension. Currently, the condition is stable. Medication Changes: none. FcqzvUljvuuk64Dhw JehuyVijbdwn52Byhtu WwspkEpbmudw05Iwf XtvvvEquklqt50Djeig WrzioCnnyhik27Fzd GkpnbWarnlfv77Blncj DoyrpJsxyglt54Myo QipzvZcacqdl87Jmrpe   Pulmonary Hypertension Discussion/Summary: The impression is pulmonary arterial hypertension. Currently, the condition is stable. Medication Changes: none. MuvymEomjomx66Svt AznvjTadqrem09Srvcu HqvynDqmipit99Onb FouudUqmemsb88Icwtb HtcrcGwpmvsg99Nsz RxjvtTlpvlsk60Ibemh OsbtmTydtgjz40Ure EzkypGezfnyi51Wbpqw DkjjlEndbfet23Cxp SewaqPcmrmgx32Xzmzm EqvlsQidpbkn38Jrc EtclNftuIjmia2Kzbyt GeesRijzHmksp6Yao DpjngEdrwyja42Hcecj   The treatment plan was discussed with for 20 minutes. JflhhYsbqqkp27Yil OfmaCellHyvbz7Chkfd QdweEizkZmsas1Aws SjxeyChfhaxy36Kqdnk   Patient is instructed to follow up in 1 month(s) with me. NskwaSrmtjpn22Vfr CardiologyD/[id number]-[id number] Upizyhvnlm0594gtn-19cc-2806-771f-18o8u4f9e8mxWkssFbcdr   LmwrfSotopom6Ybbzg Poorly controlled hypertension, patient denies any blood pressure issues. Will review his meds and repeat low pressures early next week along with an echocardiogram to reevaluate his aortic stenosis. Labs were drawn today. ZyafdFewlkbk5Mnw VeeevXhfzdpz0Ymses Chief Complaint:  Patient is a 89y old  Male who presents with a chief complaint of agitation with fall. (2017 05:15)      HPI:  89 y/o Man with dementia, b/l hip surgery brought in for worsening agitation accompanied by son. Son states for past 2-3 days his dementia/confusion has gotten worse to the point he is not making sense and unable to ambulate well. The son heard a thump upstairs and found pt on the floor attempting to get up; he had a mild head contusion, but no active bleeding. No history of seizures. History is limited due to dementia, pt required sedation for agitation in ED. (2017 05:15)    Allergies:        Allergies:  	No Known Allergies:     Medications    CurrentMeds_4_twCiteListControlStart Aspirin 325 MG Oral Tablet  Crestor 40 MG Oral Tablet; TAKE 1 TABLET BY MOUTH EVERY DAY  Fenofibric Acid 135 MG Oral Capsule Delayed Release; TAKE 1 CAPSULE BY MOUTH  DAILY  Fenofibric Acid CPDR  Isosorbide Mononitrate ER 60 MG Oral Tablet Extended Release 24 Hour; TAKE 1  TABLET BY MOUTH EVERY DAY      Past Medical History:  Dementia.  HTN,  HLD  chronic diastolic HF      Past Surgical History:  PastMedicalHistory_20_twCiteListControlStart History of Elevated cholesterol (272.0) (E78.00)  History of H/O cardiac catheterization (V45.89) (Z98.890)  History of H/O total hip arthroplasty (V43.64) (Z96.649)  History of angina pectoris (V12.59) (Z86.79)  History of sinus bradycardia (V12.59) (Z86.79)  History of Osteoarthritis, hip, bilateral (715.95) (M16.0)    Family History:  No pertinent family history in first degree relatives.    Social History:  · Marital Status		  · Lives With	spouse	    Substance Use History:  · Substance Use	never used	    Alcohol Use History:  · Have you ever consumed alcohol	unknown	    Tobacco Usage:  · Tobacco Usage: no tobacco use	    Review of Systems:  Confused/Agitated. Unable to assess.    Discussed with: Family    Physical Exam:  Vital Signs:  Vital Signs Last 24 Hrs  T(C): 37.1, Max: 37.8 (04-10 @ 22:04)  T(F): 98.8, Max: 100 (04-10 @ 22:04)  HR: 78 (62 - 78)  BP: 161/67 (138/71 - 210/104)  RR: 18 (17 - 18)  SpO2: 95% (95% - 98%)  Daily Height in cm: 167.64 (2017 10:20)    Daily Weight in k.3 (2017 10:20)    Tele: SR, first degree AVB, PVCs  General:  Appears stated age, well-groomed, well-nourished, no distress  HEENT:  NC/AT, patent nares w/ pink mucosa, OP clear w/o lesions, conjunctivae clear, no thyromegaly, no JVD  Chest:  Full & symmetric excursion, no increased effort, breath sounds clear  Cardiovascular:  Regular rhythm, S1, S2, 2/6 ISAÍAS at base, no carotid bruit, radial pulses 1+  Abdomen:  Soft, non-tender, non-distended, normoactive bowel sounds  Extremities:  no edema  Skin:  No rash/erythema. Skin is warm/dry  Musculoskeletal:  Full ROM in all joints w/o swelling/tenderness/effusion  Neuro/Psych:  Confused.     Laboratory:                            10.3   6.8   )-----------( 223      ( 2017 05:52 )             30.2     04-11    146<H>  |  111<H>  |  39<H>  ----------------------------<  104<H>  3.7   |  23  |  1.04    Ca    7.8<L>      2017 05:52    TPro  6.2  /  Alb  3.4  /  TBili  0.5  /  DBili  x   /  AST  34  /  ALT  21  /  AlkPhos  80  04-10      CARDIAC MARKERS ( 2017 05:52 )  2.100 ng/mL / x     / x     / x     / x      CARDIAC MARKERS ( 10 Apr 2017 22:11 )  .612 ng/mL / x     / x     / x     / x          LIVER FUNCTIONS - ( 10 Apr 2017 22:11 )  Alb: 3.4 g/dL / Pro: 6.2 gm/dL / ALK PHOS: 80 U/L / ALT: 21 U/L / AST: 34 U/L / GGT: x             Urinalysis Basic - ( 2017 02:45 )    Color: Yellow / Appearance: Clear / S.010 / pH: x  Gluc: x / Ketone: Trace  / Bili: Negative / Urobili: Negative mg/dL   Blood: x / Protein: 15 mg/dL / Nitrite: Negative   Leuk Esterase: Negative / RBC: 3-5 /HPF / WBC x   Sq Epi: x / Non Sq Epi: x / Bacteria: Occasional      Imaging:  ECG: SR, first degree AVB, LVH, 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    EXAM:  CHEST SINGLE VIEW                            PROCEDURE DATE:  04/10/2017        INTERPRETATION:  cough    A frontal chest film is severely rotated. Interstitial markings are   prominent    Heart is enlarged. Vascular markings are increased      No mediastinal or hilar adenopathy is suggested. .     The osseous structures appear intact intact.     IMPRESSION:  Severely rotated film. Cardiomegaly with congestive changes.    EXAM:  CT CERVICAL SPINE                          EXAM:  CT BRAIN                        PROCEDURE DATE:  2017    IMPRESSION:     CT BRAIN: No acute intracranial bleeding, mass effect, or shift.     Volume loss and chronic microvascular ischemic change.    CT CERVICAL SPINE: Diffuse osteopenia without displaced fracture.    Grade 2 C7-T1 anterolisthesis due to advanced facet degeneration.    C4-C5 degenerative cord compression and at least moderate canal stenosis   due to disc osteophyte complex and OPLL. If no prior imaging is available   for comparison, consider nonemergent MRI cervical spine to assess for   cord edema or myomalacia. Clinically correlate for myelopathy.      Echo from 9-11-15  Conclusions: WffopFrhmfpl71Yry NgwwhCigzgez891Wcqbh   1. Normal right and left ventricular systolic function. Biatrial enlargement. Grade 1 diastolic dysfunction. AbeniGbxtxwx730Qmg QjdskWmrvftg617Infun MxfpqJyocras463Twi MiunlHdvybwc819Fcvcu   2. Moderate tricuspid insufficiency. Mild mitral and aortic insufficiency. Mild aortic stenosis.   3. No pericardial effusion. ImiyfBvmbxzz300Udk ZcjjhNbacpml757Eqypp BmjktLdvbavf063Qwa ZjnbaKoktfdh228Queei IkqilUausjnj403Pcs EbexnBhezibu677Ukrxq LqhzwEvuwjrv707Xrp OtvruSvxmhaf173Iuuvr 4. As compared to previous study dated 14, no significant interval changes are seen. PezpxIxpkvbb598Hhp Bdkzuvzxllgqxzpy1u2a8x-3x3j-4q5v-8g3d-xm0s32cp2c1aVgceGkh      Echo from 2017  EF 60-65  Mild / moderate AS with mild AI  Mild TR, Trace MR/CA    Assessment: 89 y/o Man withf dementia, b/l hip surgery brought in for worsening agitation accompanied by son. Son states for past 2-3 days his dementia/confusion has gotten worse to the point he is not making sense and unable to ambulate well. The son heard a thump upstairs and found pt on the floor attempting to get up; he had a mild head contusion, but no active bleeding. No history of seizures. History is limited due to dementia, pt required sedation for agitation in ED. NSTEMI vs. demand ischemia.    Plan:     Tele monitoring.  Con't with:  aspirin enteric coated 81milliGRAM(s) Oral daily  heparin  Injectable 5000Unit(s) SubCutaneous every 12 hours  sodium chloride 0.9%. 1000milliLiter(s) IV Continuous <Continuous>  OLANZapine 5milliGRAM(s) Oral every 12 hours  Re start statin and imdur.    Supportive care.    Braxton Lafleur MD, FACC, FASCONSTANTINE, ANA MARIA, FACP  Director, Heart Failure Services  [facility]  , Department of Cardiology  [facility]    Last office Visit Note from 17:    Reason For Visit  EfhpymzaylQQW7iyp5is7-4knc-75q1-i764-19q96k05ku61ZkkcAjpqa QrhycEzrtxlv5Tpaae MIGUEL CASIANO is being seen for a clinic follow-up of coronary artery disease and Heart murmur. JzhgwTtckncg7Raq HiiqeJsuuzdp1Bpudp QpserXqweduo9Rcm OdzrFwfbbvp96Cuemb ZwqxRrrzlcj74Yea TzwijcZixcyfl97Xgren RdviywJpzxiwc39Wmw XabxyggtzqDPP2edl2ct6-5htk-90u5-y799-83j91k90qx04TsxrBtf        ReasonForVisitSectionEnd  HistoryofPresentIllnessSectionStart History of Present Illness  HxggciockFOOxk814971-t03b-9iht-qvap-322276k3y825AfmeSebez NeoaBtbfMbqyd0Sftkv 89-year-old male followed for history of coronary artery disease, Also followed for history of diastolic heart failure, hypertension, hyperlipidemia and aortic stenosis.   Miguel has become quite hard of hearing and it was difficult to get an adequate history. He claims he is compliant with his medications however he can't recall which medications he is taking or how many. He denies any recent chest pain or shortness of breath. His blood pressures, which have been somewhat poorly controlled in the past were extremely high today.No recent labs have been drawn. EhzgSzuhXloug9Iqv StqwzshqmKHPgf383963-q13t-3ipg-mzqy-308014j7d128PxabNwh        HistoryofPresentIllnessSectionEnd  CardiologySummarySectionStart Cardiology Summary  VljthrceolVsqoxcek667m6bt-bg97-678r-b767-s6d789c30420NghoAmunb IpijiSqzdxfo6Hrmzi   EK17, Sinus Bradycardia -First degree A-V block Voltage criteria for LVH (R(I)+S(III) exceeds 2.50 mV) -Voltage criteria w/o ST/T abnormality may be normal.  -consider old anterior infarct.  -Nonspecific ST depression + Nonspecific T-abnormality WkeoyLckzvnt3Sgu KuogsLscfetw51Zxhux   Stress Test: 4/10, no Ischemia, no exercise induced arrhythmias, no Symptoms NvkueAvfphiq80Vgr AumqpSdobmmp79Blace   Echo: 14, Mild AS, AI, MR. Moderate TR. Mild pulmonary hypertension. LVEF 50%. 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 BvagqMglpckz8Wwh UiinqSbzpeew70Twwjb PhnsxOhmaqxs53Sjx ClibcHimkzrl13Frqcx UinwyWegevio81Zqj PeeatYwfzpqd86Jorsa FivjnHobrqea19Fnx KefxwWvplnkw46Rptkm XhjplXqdhftl13Nfr GdfshLxxghst59Eexmr EzipvTvvjfaf26Hfh WvneaRmqkijy01Niiac AojnlSieyqai73Vsd PswauAjkahdw72Gtatt KugesHwopadc76Hyj GuapbEahbgtu36Tgeic TrkuiArnatdy98Ort PtmxlXnjhuol12Fxmuz VvnjsXgevmsy76Shg FggthStvxhuv81Kdbps BzhtrPmpgqmu31Snx MjdnxVuemelj47Sjxuk PorbqFuvjcbc82Wga XtainzpxifGvzzpxxw549x2sz-zg73-356z-t487-l9u383e85791JwxqEcj        CardiologySummarySectionEnd  ActiveProblemsSectionStart Active Problems  ActiveProblems_20_twCiteListControlStart Aortic stenosis, moderate (424.1) (I35.0)  Bilateral hearing loss (389.9) (H91.93)  Chronic diastolic heart failure (428.32) (I50.32)  HTN (hypertension) (401.9) (I10)  Hyperlipidemia (272.4) (E78.5)  Systolic murmur (785.2) (R01.1)  ActiveProblems_20_twCiteListControlEnd     ActiveProblemsSectionEnd  PastMedicalHistorySectionStart Past Medical History  PastMedicalHistory_20_twCiteListControlStart History of Elevated cholesterol (272.0) (E78.00)  History of H/O cardiac catheterization (V45.89) (Z98.890)  History of H/O total hip arthroplasty (V43.64) (Z96.649)  History of angina pectoris (V12.59) (Z86.79)  History of sinus bradycardia (V12.59) (Z86.79)  History of Osteoarthritis, hip, bilateral (715.95) (M16.0)  PastMedicalHistory_20_twCiteListControlEnd     PastMedicalHistorySectionEnd  SurgicalHistorySectionStart Surgical History  SurgicalHistory_20_twCiteListControlStart History of Total Hip Replacement  SurgicalHistory_20_twCiteListControlEnd     SurgicalHistorySectionEnd  FamilyHistorySectionStart Family History  FamilyHistory_20_twCiteListControlStart Family history of Hearing loss, unspecified laterality : Father  No pertinent family history : Mother  FamilyHistory_20_twCiteListControlEnd     FamilyHistorySectionEnd  SocialHistorySectionStart Social History  SocialHistory_20_twCiteListControlStart Former smoker (V15.82) (Z87.891)    Never smoker  Non-smoker (V49.89) (Z78.9)  Social alcohol use (Z78.9)  SocialHistory_20_twCiteListControlEnd     SocialHistorySectionEnd  CurrentMedsSectionStart Current Meds  CurrentMeds_4_twCiteListControlStart Aspirin 325 MG Oral Tablet  Crestor 40 MG Oral Tablet; TAKE 1 TABLET BY MOUTH EVERY DAY  Fenofibric Acid 135 MG Oral Capsule Delayed Release; TAKE 1 CAPSULE BY MOUTH  DAILY  Fenofibric Acid CPDR  Isosorbide Mononitrate ER 60 MG Oral Tablet Extended Release 24 Hour; TAKE 1  TABLET BY MOUTH EVERY DAY  CurrentMeds_4_twCiteListControlEnd     CurrentMedsSectionEnd  AllergiesSectionStart Allergies  Allergies_20_twCiteListControlStart No Known Drug Allergies  Allergies_20_twCiteListControlEnd     AllergiesSectionEnd  HistoryReviewedSectionStart History Reviewed  IpjatnuObdwhvfa53949044-62q3-11u5-0037-yrrr4uiafl70TijdIqonx XhfkDzvynpo8Tscaq History reviewed. CwfyEetdhgx3Sng LjjxBjixjwe0Hxmxi Medications and allergies reviewed. ClobLbtivss5Hpe MvutvguRqgycecl94262294-79i6-28c3-1921-dugy1gmfvr81WdcuYvm        HistoryReviewedSectionEnd  ReviewofSystemsSectionStart Review of Systems  Dypyqodb1018ux9n-21p7-209o-zu23-eu9tn27t5z8vWazxZblbg RkbngTcjdsxo1Bcygw NztjhNswfxzl1Zwe HgfuvLmhhsnj3Jaefv IdvfiFjuqtpr1Ntp YvqptBtnlhxe3Xhifb WmmnzHmxsdfc4Jnm DhewqTtseonp7Lvrfc KnfqsDaooqiz5Znv ZwyfyQfosjjb88Myfpr IoikoBpytyvh31Jyk CudmxDkdlwhb81Utdae ZisaiCnwbylk78Tqf SmbbxXlzyehy81Owged WquefMslgsuh52Plk EiwqyXciavgu76Djdcj LgnaqBwajdwr80Ykb JyqkiRdvawgz55Tqbfy LmqqlFabdrgo91Goj CtwtcPcdnljx4Moabh FiypgUglvyic5Xod JdqclQovtpbr4Qkzag AmtigYsihelh2Kjc JzfzbWkqrjso2Egipd UafepZrludxq4Lsg JzgpkUsfkbre0Luvul LpfnuIsbifvq8Iib NqiyoDbhylby17Lkxch RctrrEejzypf19Jfx KylglEsaibdu77Agxek   Constitutional, Eyes, ENT, Cardiovascular, Respiratory, Gastrointestinal, Genitourinary, Musculoskeletal, Integumentary, Neurological, Psychiatric, Endocrine and Heme/Lymph are otherwise negative. ZasjiKmvfntz77Hqa Crjqqsbs3174da1f-69c4-929w-rp80-aj7mx13s3q9oSpnxWdg        ReviewofSystemsSectionEnd   VitalsSectionStart Vitals  Vitals_110_twCiteListControlStart Vital Signs    ** Printed in Appendix #1 below.   Vitals_110_twCiteListControlEnd     VitalsSectionEnd  PhysicalExamSectionStart Physical Exam  Constitutional(Brief)FMWJI6i6n717l-3a22-534o-53z9-6uh68fsc2r30JvibNnmsk VStart Constitutional: well developed, normal appearance, well groomed, well nourished, no deformities and no acute distress. VEnd HufevWorqqas4Fpaud DqajnJqvcsdt6Res JiyxtSipvkyb5Mxqay TqiyzCpuwyue1Lqh ZfggfSrjgoia9Dsgsx PuwwiTnnltdk7Saq JwllgBbrbzyn7Tynvh CplieOvcuawc8Tww Constitutional(Brief)LUQDE4k5l135o-7y11-597z-87v4-9ko21eom3q90BtdoEwv Eye(Brief)[id number]   VStart Eyes: the conjunctiva exhibited no abnormalities and the eyelids demonstrated no xanthelasmas. VEnd KpwkoMxekzpn3Uybhh VcnmePahoioo8Fpy Eye(Brief)[id number] HEENT(Brief)NSSLZ479s2i3r-97pt-248e-z552-[id number]   KebkmOtskmam8Gogab HEENT: normal oral mucosa, no oral pallor and no oral cyanosis. LdqbpAsafgdm0Iyf UatwgYifbyvk1Uhbnd MnsddEduxioi7Iiu ZgcaDrorJcscm1Bvoyb JdulXoxaJljql7Qnp HEENT(Brief)ICSBH164q9h2g-37uo-515f-o000-bh5m77030328HlflIsq Neck(Brief)ELBVW774nw772-v81e-0gam-f3g6-zc997302421qRyomVprne   KhvblQfssqjc52Zmzwj Neck: normal jugular venous A waves present, normal jugular venous V waves present and no jugular venous lema A waves. HqsyiHaagnme44Xdp OogbsHulqwzw4Qnqej RiaxzGjuwlhv3Qhz Neck(Brief)[id number]-[id number]-d8u9-[id number] Pulmonary(Brief)ERZROyjx617p7-8zrp-59s1-58z7-m6f2217wohdsOsvmPnyzk   KzyutFvpsacp08Qfsoe Pulmonary: no respiratory distress, normal respiratory rhythm and effort, no accessory muscle use and lungs were clear to auscultation bilaterally. RmulwXlpjmso81Hsl ZxiixZbgygti53Gkxad XyrjmLnwjwhj20Tjt DjfjcAjjlpni02Gcqua TttigCnvkvhi85Jzo TsfovMvrpnkk56Jihxo SceeeWbvozor37Tgy VhsicUchlkqp94Tvcza EdnogQmqjszt91Wmz WgjdaHrzppjt43Mtbst KimwdQsvjuvv86Thg GyjyjNeoycxw12Nnuja OuvmwNydnxal68Psn IhedyEplumyl3Kwkbk AgvrsRpatntc8Kqc Pulmonary(Brief)CQOSOzaa040l0-2coy-27f8-31w3-m8i2713dxgdaKrtkGle Abdomen(Brief)NHJXK38rw70v7-69z8-7aa5-65l3-eith03t53o0bQxyfWozhz   VStart Abdomen: soft, non-tender, no hepato-splenomegaly and no abdominal mass palpated. VEnd CttdaKtmfvxy5Fxumd MixsgPnpknyj6Nvt ZhztrVxeazin4Srwyc FyabhTvjymyn2Kev RxtiqNqikxsg9Rslmi FleslTxdguvt4Vuz QnvyeBrczzro8Kbyov ZepswVirlity1Dbl KyevcTgrugwv7Eqpml KbjcjXoywtzm1Fxm ApenbXigwchn7Rcymj CkcrpYazbjff1Gjs QfdndWnyhdjw0Siqkb AsasoSiowsby5Dxu Abdomen(Brief)OHMFX39lo25z5-08j5-6ae5-45f4-leij14u26y0kYotxNqt Musculoskeletal(Brief)LPFHV527p5617-6eun-5203-t8j6-d557p090img2DzabChgqz   CamclVjqqwqo7Bqxpv Musculoskeletal: normal gait . the gait was sufficient for exercise testing. PycsyPrzsvty4Zzb YaxiiKzlymwc1Uuekh WwpiyLdvcsge5Mja TohkqIjbhifi7Xnhlu GkuqtGesdikm3Zio Musculoskeletal(Brief)MWXDP677m3311-6qfz-4359-a0g7-n846b934spv7IuatZdj Extremities(Brief)QNNFV7894i2jg-8w8y-9qp0-5324-69053h070069ItsaMouvo   WfevmVrjqeld9Gyxkj Extremities: no clubbing of the fingernails, no localized cyanosis, no petechial hemorrhages and no ischemic changes. QirhdAjszsbl4Bhx HygahBekyvih4Qklcu SnyclVjyxrsj8Civ YmlzkIxhvpph1Mekzm DrjwkTtvhkra1Abd GktsgYovynvm5Voxoy TcpskXrrxcyx9Nqs YfvnbOglvloy1Vhcbk WrdezYowfgjv9Ruc JmbfeWpsttat7Ccyzh RdfpbFsfihhz3Mca FhttmAbvgind2Kvgsw YugtaCielbpi8Dzu YhqvrAeifoqk4Igvij IrilkJqerbwp2Gca Extremities(Brief)HUUUW7225a0mq-8u3s-6xe8-4502-79714n511811VueuTbq Skin(Brief)ANSOQ5kd80wo3-7j8b-235x-l9gf-7qbahf88e1mwZdmoWsxoc   LknepPuolewz1Crsas Skin: normal skin color and pigmentation, no rash, no venous stasis, no skin lesions, no skin ulcer and no xanthoma was observed. ZvejcUkeuvkd6Jcp VzlojKtsczqr9Zhlxs JnddgGsskxyu1Ore GifpfUdmzqfh6Qemnb HpnpiQpvvrqc8Ain KmvfpYcgtpmy5Qaxnt SvarhIvapxeu6Ssn RwmbuTswieft9Vktwn HbibwMcxkemn0Vnu MypxoSyrrznj3Yvbfu YtvrsWpwceze2Orl Skin(Brief)NBUME5dm66ei0-1e1s-039h-x0em-0nawyp37q2emBjlsJwo Psychiatric(Brief)ZOJWIq147092i-i0b8-7969-o101-l4jh12mx4526FggdHzego   DopoxJtshqqq5Bgjnz Psychiatric: oriented to person, place, and time, the affect was normal, the mood was normal and not feeling anxious. ZwvlnQnkptkr8Oqr CxkhbDpgkfkw1Rmbpt QywpqKwjhlxf2Hcv OclqhSrnvcxv0Tykkf CftvuRmhlhdl0Zwf XjobmFartosr6Mypch OiclwDvqwryz9Ucv UxoqvAvykyik5Rharm MsporWxlobbd2Tvr RealuTxirlwy0Xmyjw TtdauXpprnbq8Mkk ZjxgaEzfwgrn3Kodnz IsongAnewzki7Tke PgxglHmpbncv31Oidqs EcuglZabcujr23Pbw Psychiatric(Brief)WPKZWy000438v-b4h3-2772-t327-c8wj67lu9598MnnrGlr WfndxjuJJBNOKldogoa91rik-s9q1-559s-498h-6n104o9q12w2IcwaBrmqu   UjbkdIczvwhh74Owdru Cardiovascular: NsyjpIgbruqm35Ipe EdjjzKikcvbm05Gjvhv CriwpWjglose00Nuh YifuwRbmjeml16Drzfb The PMI was palpated at the 5th LICS in the midclavicular line. OditkBboqyjp39Ayd PzulgWgbynrc9Iluuy The apical impulse was dyskinetic. CabieYbdbajb2Hbt OkwqyAlflgoq71Oojxo a precordial heave was noted at the left lower parasternal line. SyqobUwlpamq11Zds CuuatAdqrasr1Cmmse FqmfzTiesojk0Eob TpcznRvjlsap3Lkxpj The heart rate was bradycardic, 52 bpm. DinbmGfbppmo2Rit PmnydIwmctzc61Hddtn The rhythm was regular. SsefaXflltqc75Epr SgvhpTqodcdy9Avrcq Heart sounds: abnormal S1, normal S2. YzefnRwotimp3Mro [id number] [id number] NbscuCpiqkfj86Axqfh LhizrVkvgeac67Hvj IzohfFurbswv6Cmrmh NvucsDbiuxaj9Edn JfqvhMcaufiz25Tmyad LdkfjRmbkycl23Yza FdjkoNiycckw62Oslhx XwedwHvzppbr95Wib SimwcYsbsxcy60Uhsbq ZmygdApzniyu64Urp VvrfyYaslwuy6Neyec TqiygXhhrstc4Aci MnwkaFzqlzpa36Yugup EshmwExxzcxt86Yya LyvpfXeadlyb34Dyikb   A grade 2 systolic murmur was heard at the [facility]. WtcjdKudqueh00Anz AqpemKrpeefy24Nsngt KypmkTqfoicf02Obd NivufEhybhfx92Urwow PoixgHectxro34Gwi BmdxcZfxkbsn85Nnpkb FlzrrChprlbg60Byy AhlosZtpwnny65Uqklu JapjdWdqcyfb23Rav ZegblRgdprld08Kwraq IsyzdCwuhvsb07Xru RrepfDnkmlhf13Haxfm JefifCiomdkl91Jtu RrogsRzdtjsz91Bexdd UdjmtJjzfcjp66Zfb VygmdLvibfhe49Imilm LfvdmJjmfmvl65Asc OtopoWbfpite98Qctvb TtsmuDbturyx04Rjb SfhrgBfgblzv32Osnpy LgnvxVeqkfzf24Fxr HhyopFtqbxcd04Qkkfj AvcczIuutglc53Hom GacjhTwclqwh01Krykm QflisXvopfzp54Ryq PjkqkMymfyas34Yphbq YvocxZtpfvqg90Yev VskfcWegqeot80Choav BelauKkhglqs87Hxt PeafxIgsgeze79Iakcu JtghuDxqtgto07Uxe QkswhEdkvtiq23Oroiw GcvanHtynexi22Ljt NjwqiBvcnvar46Mugrv   Pulses: no bruit heard over the right carotid and no bruit heard over the left carotid. TnhneKfhogxe58Lgm KqygwBmevwxw68Lwmwd JxzeoCkrigbu35Zex DiybmVznrwgy06Ycszp SoeguBmfkpnj70Lbw FcxhnDizurmt18Smosw OplgdJarozfg01Zna XoactHvycbtr74Kkrzs DjvnkIczqaox22Lli RacsqMmsjntx79Ytrnu MqyktEexxxth98Hng XusrpWvivtnb36Kilur Femoral: right 2+ and left 2+. ZtcgmGhuzzcz64Xoc OkdebLkkeuna44Didur UhsqgSxbarkd05Qyv DustzZtuxvgl28Htlba YutcyPvqihlr95Sru QsbwzVrtmaki12Ihcqi BkpjhRiegznx21Cfe VspunOwwmsso85Xbtem XpxriMfnapkq48Cdf UgmzgJafwvcv34Savmt RxurqQolqzpy99Src TwdksXncixlv56Ifmdw Dorsalis pedis: right 2+ and left 2+. BnlhaXvhyroe39Ksp KdufvWrypmnt86Jkwme AaknpGfxgfzs51Dyg NqbulSeiupdp72Tdyxj   Abdominal Aorta: the abdominal aorta was not enlarged and no bruit was heard, no bruit heard. ZiqgiRxkwxgv26Rld PcqmiCriarax42Upilq   Lower Extremities: no varicose veins of the right leg and no varicose veins of the left leg. BymfeTkybzli63Llp JmvraMywecui59Fyfpr RfgllLllgkgi23Rhj UkpvsSlodndx19Fyspj LczkbJnytcoy30Fvw CdzrrEkwbcpp69Labff IpoxqLvxxolw02Vdb YapyvVhqtvfg98Xgmwb DebytDcsynbg20Qvy McsspTpcogxe49Ldwof DmeyqSvonkmx24Ztq CmymwSskfoql76Mltrx VedegRuwcrgn42Ehp CujffFzwxnsk97Awcer EfxrrKzaxase70Xwq KlmjercFHETZJsyieco75dtn-u9p5-571k-371h-7w661f1h62f6GuzaUdd        PhysicalExamSectionEnd  AssessmentSectionStart Assessment  Assessment_80_twCiteListControlStart Aortic stenosis, moderate (424.1) (I35.0)  Bilateral hearing loss (389.9) (H91.93)  Chronic diastolic heart failure (428.32) (I50.32)  HTN (hypertension) (401.9) (I10)  Hyperlipidemia (272.4) (E78.5)  Systolic murmur (785.2) (R01.1)  Assessment_80_twCiteListControlEnd     AssessmentSectionEnd  PlanSectionStart Plan  Plan_2_twCiteListControlStart Aortic stenosis, moderate, Chronic diastolic heart failure, Hyperlipidemia   Complete Blood Count w DIFF; Status:Active; Requested for:2017;   Comprehensive Metabolic Panel; Status:Active; Requested for:2017;   C-Reactive Protein Cardiac; Status:Active; Requested for:2017;   Free T3; Status:Active; Requested for:2017;   Free T4; Status:Active; Requested for:2017;   Hemoglobin A1C, Whole Blood; Status:Active; Requested for:2017;   Lipid Profile; Status:Active; Requested for:2017;   Magnesium, Serum; Status:Active; Requested for:2017;   Thyroid Stimulating Hormone, Serum; Status:Active; Requested for:2017;   Vitamin D, 25-Hydroxy; Status:Active; Requested for:2017;   HTN (hypertension)   Echocardiogram; Status:Hold For - Scheduling; Requested for:2017;   Follow Up___ Outpatient  .with nurse practitioner  Status: Hold For - Scheduling   Requested for: 2017  Plan_2_twCiteListControlEnd     PlanSectionEnd  Discussion/SummarySectionStart Discussion/Summary  CardiologyD/[id number] JndvoLhqnsdt2Ylwjo GlrrsQctwadf1Gyw UgkxnVfnqbpd78Lleez WhrzcIejusda99Sww SzzmxZpcvmhb1Ivrog EdxjoCgqpszv6Ewo WonqqKckrzmj18Erjiz QwibdGklvjnz31Zgy AqpuoNxdvqie3Wqlmx OednsCfnqtxc1Jjy EhnzyWklpqgk4Idsuc JfnsxVldkoxq6Qwx GkxrsVxlpbrt7Iurjj UfrniSvvahuu4Ogr XrlikDyxhtvw6Xulhd BwtgaRavskqm4Dcv OidunGrdgwtu3Skmtj SbkppYniyzjb8Ucd GgxzdXysvdck88Rhftb   Coronary Artery Disease Discussion/Summary:   Impression: coronary artery disease. Currently, this condition is stable. Medication Changes: none. JqdltCameptv65Xoq MypvqXvhathh9Mqorf QbalhEbrqwro3Blt NmjsfCrnhvqi94Atpou NfdioCwgajpm36Xpj JgvalUurasul7Tehmz   Hyperlipidemia Discussion/Summary:   The impression is hyperlipidemia. Currently, this condition is stable. The diagnostic plan includes a fasting lipid profile. SnjakBqlrqak6Jib CnqwwXhrctpq25Jaljo   Hypertension Discussion/Summary: The impression is hypertension. Currently, the condition is stable. Medication Changes: none. SkrpmBkhiokh42Scu EygxqIlzdftm42Yenij FqazuOxyuvxf43Mbs AdpizKjtipsq15Aqemo EdcgaJdqempz51Zjl ZvajdWgpnxnk36Imgsc MnfnwZtaxltq41Srz TfueiHacfwsi38Mzggp   Pulmonary Hypertension Discussion/Summary: The impression is pulmonary arterial hypertension. Currently, the condition is stable. Medication Changes: none. MqdvlYwpoded91Lzz OphsbWtkbpvj31Yglxt OjkjxWwtpvuz10Xry OtmhoCbjwteg81Mnfet FgfacChglexg32Uxd NolftYhanbpu96Supgt BhhcjRdvbhnz87Blf NtthsXrifxxa84Wrmul FbufsRhtndiv23Afk WdivmFciksgi52Mpbkp CruxoTehwsri78Qry FculOihgSozes2Jompd JqjyUehgLlamh1Htb UrehiPmawgoc14Hffev   The treatment plan was discussed with for 20 minutes. DitayOhfbocc35Rqa YebnLdslXyhnm5Soyes LnkjZdohGbptd4Klz WjdqzZuysnis91Qvaqx   Patient is instructed to follow up in 1 month(s) with me. UftflQglttxt23Jgs CardiologyD/[id number] Qdshinbnpf1918eev-97lu-[id number]-[id number]   BeyxaMqughax2Gjixv Poorly controlled hypertension, patient denies any blood pressure issues. Will review his meds and repeat low pressures early next week along with an echocardiogram to reevaluate his aortic stenosis. Labs were drawn today. WwapkFofgozk7Anx OibegOsrkcqp0Rtdau

## 2017-04-11 NOTE — H&P ADULT. - HISTORY OF PRESENT ILLNESS
89 y/o Man withf dementia, b/l hip surgery brought in for worsening agitation accompanied by son. Son states for past 2-3 days his dementia/confusion has gotten worse to the point he is not making sense and unable to ambulate well. The son heard a thump upstairs and found pt on the floor attempting to get up; he had a mild head contusion, but no active bleeding. No history of seizures.  History is limited due to dementia, pt required sedation for agitation in ED.

## 2017-04-11 NOTE — CHART NOTE - NSCHARTNOTEFT_GEN_A_CORE
patient agitated. trying to climb out of bed. Demented 89 year old male with no acute head trauma or CVa. Plan Zyprexa for agitation.

## 2017-04-11 NOTE — H&P ADULT. - RS GEN PE MLT RESP DETAILS PC
good air movement/clear to auscultation bilaterally/no chest wall tenderness/no rhonchi/airway patent/respirations non-labored/no intercostal retractions/breath sounds equal/no wheezes/no rales

## 2017-04-12 LAB
ANION GAP SERPL CALC-SCNC: 13 MMOL/L — SIGNIFICANT CHANGE UP (ref 5–17)
BUN SERPL-MCNC: 22 MG/DL — SIGNIFICANT CHANGE UP (ref 7–23)
CALCIUM SERPL-MCNC: 8.4 MG/DL — LOW (ref 8.5–10.1)
CHLORIDE SERPL-SCNC: 111 MMOL/L — HIGH (ref 96–108)
CK MB BLD-MCNC: 1.5 % — SIGNIFICANT CHANGE UP (ref 0–3.5)
CK MB CFR SERPL CALC: 9.7 NG/ML — HIGH (ref 0.5–3.6)
CK SERPL-CCNC: 653 U/L — HIGH (ref 26–308)
CO2 SERPL-SCNC: 21 MMOL/L — LOW (ref 22–31)
CREAT SERPL-MCNC: 0.92 MG/DL — SIGNIFICANT CHANGE UP (ref 0.5–1.3)
CULTURE RESULTS: NO GROWTH — SIGNIFICANT CHANGE UP
GLUCOSE SERPL-MCNC: 92 MG/DL — SIGNIFICANT CHANGE UP (ref 70–99)
HCT VFR BLD CALC: 31.6 % — LOW (ref 39–50)
HGB BLD-MCNC: 11.4 G/DL — LOW (ref 13–17)
MCHC RBC-ENTMCNC: 31.5 PG — SIGNIFICANT CHANGE UP (ref 27–34)
MCHC RBC-ENTMCNC: 35.9 GM/DL — SIGNIFICANT CHANGE UP (ref 32–36)
MCV RBC AUTO: 87.8 FL — SIGNIFICANT CHANGE UP (ref 80–100)
PLATELET # BLD AUTO: 246 K/UL — SIGNIFICANT CHANGE UP (ref 150–400)
POTASSIUM SERPL-MCNC: 3.5 MMOL/L — SIGNIFICANT CHANGE UP (ref 3.5–5.3)
POTASSIUM SERPL-SCNC: 3.5 MMOL/L — SIGNIFICANT CHANGE UP (ref 3.5–5.3)
RBC # BLD: 3.6 M/UL — LOW (ref 4.2–5.8)
RBC # FLD: 13.9 % — SIGNIFICANT CHANGE UP (ref 11–15)
SODIUM SERPL-SCNC: 145 MMOL/L — SIGNIFICANT CHANGE UP (ref 135–145)
SPECIMEN SOURCE: SIGNIFICANT CHANGE UP
T PALLIDUM AB TITR SER: NEGATIVE — SIGNIFICANT CHANGE UP
TROPONIN I SERPL-MCNC: 4.49 NG/ML — HIGH (ref 0.01–0.04)
TROPONIN I SERPL-MCNC: 4.67 NG/ML — HIGH (ref 0.01–0.04)
VIT B12 SERPL-MCNC: 508 PG/ML — SIGNIFICANT CHANGE UP (ref 243–894)
WBC # BLD: 6.3 K/UL — SIGNIFICANT CHANGE UP (ref 3.8–10.5)
WBC # FLD AUTO: 6.3 K/UL — SIGNIFICANT CHANGE UP (ref 3.8–10.5)

## 2017-04-12 PROCEDURE — 99232 SBSQ HOSP IP/OBS MODERATE 35: CPT

## 2017-04-12 PROCEDURE — 99233 SBSQ HOSP IP/OBS HIGH 50: CPT

## 2017-04-12 PROCEDURE — 71010: CPT | Mod: 26

## 2017-04-12 RX ORDER — SODIUM CHLORIDE 9 MG/ML
1000 INJECTION INTRAMUSCULAR; INTRAVENOUS; SUBCUTANEOUS
Qty: 0 | Refills: 0 | Status: DISCONTINUED | OUTPATIENT
Start: 2017-04-12 | End: 2017-04-14

## 2017-04-12 RX ORDER — ACETAMINOPHEN 500 MG
650 TABLET ORAL EVERY 6 HOURS
Qty: 0 | Refills: 0 | Status: DISCONTINUED | OUTPATIENT
Start: 2017-04-12 | End: 2017-04-20

## 2017-04-12 RX ADMIN — OLANZAPINE 5 MILLIGRAM(S): 15 TABLET, FILM COATED ORAL at 06:39

## 2017-04-12 RX ADMIN — HEPARIN SODIUM 5000 UNIT(S): 5000 INJECTION INTRAVENOUS; SUBCUTANEOUS at 06:39

## 2017-04-12 RX ADMIN — OLANZAPINE 5 MILLIGRAM(S): 15 TABLET, FILM COATED ORAL at 17:54

## 2017-04-12 RX ADMIN — HALOPERIDOL DECANOATE 5 MILLIGRAM(S): 100 INJECTION INTRAMUSCULAR at 20:09

## 2017-04-12 RX ADMIN — Medication 81 MILLIGRAM(S): at 12:33

## 2017-04-12 RX ADMIN — HEPARIN SODIUM 5000 UNIT(S): 5000 INJECTION INTRAVENOUS; SUBCUTANEOUS at 17:54

## 2017-04-12 RX ADMIN — SODIUM CHLORIDE 125 MILLILITER(S): 9 INJECTION INTRAMUSCULAR; INTRAVENOUS; SUBCUTANEOUS at 16:55

## 2017-04-12 RX ADMIN — Medication 650 MILLIGRAM(S): at 06:40

## 2017-04-12 NOTE — PROGRESS NOTE ADULT - ASSESSMENT
89-year-old male admitted with agitation, status post fall. Elevated troponins likely due to non-ST elevation MI either prior or after the fall. Patient appears more confused than baseline but does not appear to be in any significant distress. Given his age, dementia and overall debility there is likely little point in pursuing any diagnostic testing or revascularization procedures. Patient appears hemodynamically stable and would continue to treat the patient conservatively. Attempts to reach out to family and discuss this matter; will likely require short-term rehabilitation.

## 2017-04-12 NOTE — PROGRESS NOTE ADULT - SUBJECTIVE AND OBJECTIVE BOX
Chief Complaint:  Patient is a 89y old  Male who presents with a chief complaint of agitation with fall. (2017 05:15)      HPI:  89 y/o Man with dementia, b/l hip surgery brought in for worsening agitation accompanied by son. Son states for past 2-3 days his dementia/confusion has gotten worse to the point he is not making sense and unable to ambulate well. The son heard a thump upstairs and found pt on the floor attempting to get up; he had a mild head contusion, but no active bleeding. No history of seizures. History is limited due to dementia, pt required sedation for agitation in ED. (2017 05:15)    Allergies:        Allergies:  	No Known Allergies:     Medications    CurrentMeds_4_twCiteListControlStart Aspirin 325 MG Oral Tablet  Crestor 40 MG Oral Tablet; TAKE 1 TABLET BY MOUTH EVERY DAY  Fenofibric Acid 135 MG Oral Capsule Delayed Release; TAKE 1 CAPSULE BY MOUTH  DAILY  Fenofibric Acid CPDR  Isosorbide Mononitrate ER 60 MG Oral Tablet Extended Release 24 Hour; TAKE 1  TABLET BY MOUTH EVERY DAY      Past Medical History:  Dementia.  HTN,  HLD  chronic diastolic HF      Past Surgical History:  PastMedicalHistory_20_twCiteListControlStart History of Elevated cholesterol (272.0) (E78.00)  History of H/O cardiac catheterization (V45.89) (Z98.890)  History of H/O total hip arthroplasty (V43.64) (Z96.649)  History of angina pectoris (V12.59) (Z86.79)  History of sinus bradycardia (V12.59) (Z86.79)  History of Osteoarthritis, hip, bilateral (715.95) (M16.0)    Family History:  No pertinent family history in first degree relatives.    Social History:  · Marital Status		  · Lives With	spouse	    Substance Use History:  · Substance Use	never used	    Alcohol Use History:  · Have you ever consumed alcohol	unknown	    Tobacco Usage:  · Tobacco Usage: no tobacco use	    Review of Systems:  Confused/Agitated. Unable to assess.    Discussed with: Family    Physical Exam:  Vital Signs:  Vital Signs Last 24 Hrs  T(C): 37.1, Max: 37.8 (04-10 @ 22:04)  T(F): 98.8, Max: 100 (04-10 @ 22:04)  HR: 78 (62 - 78)  BP: 161/67 (138/71 - 210/104)  RR: 18 (17 - 18)  SpO2: 95% (95% - 98%)  Daily Height in cm: 167.64 (2017 10:20)    Daily Weight in k.3 (2017 10:20)    Tele: SR, first degree AVB, PVCs  General:  Appears stated age, well-groomed, well-nourished, no distress  HEENT:  NC/AT, patent nares w/ pink mucosa, OP clear w/o lesions, conjunctivae clear, no thyromegaly, no JVD  Chest:  Full & symmetric excursion, no increased effort, breath sounds clear  Cardiovascular:  Regular rhythm, S1, S2, 2/6 ISAÍAS at base, no carotid bruit, radial pulses 1+  Abdomen:  Soft, non-tender, non-distended, normoactive bowel sounds  Extremities:  no edema  Skin:  No rash/erythema. Skin is warm/dry  Musculoskeletal:  Full ROM in all joints w/o swelling/tenderness/effusion  Neuro/Psych:  Confused.     Laboratory:                            10.3   6.8   )-----------( 223      ( 2017 05:52 )             30.2     04-11    146<H>  |  111<H>  |  39<H>  ----------------------------<  104<H>  3.7   |  23  |  1.04    Ca    7.8<L>      2017 05:52    TPro  6.2  /  Alb  3.4  /  TBili  0.5  /  DBili  x   /  AST  34  /  ALT  21  /  AlkPhos  80  04-10      CARDIAC MARKERS ( 2017 05:52 )  2.100 ng/mL / x     / x     / x     / x      CARDIAC MARKERS ( 10 Apr 2017 22:11 )  .612 ng/mL / x     / x     / x     / x          LIVER FUNCTIONS - ( 10 Apr 2017 22:11 )  Alb: 3.4 g/dL / Pro: 6.2 gm/dL / ALK PHOS: 80 U/L / ALT: 21 U/L / AST: 34 U/L / GGT: x             Urinalysis Basic - ( 2017 02:45 )    Color: Yellow / Appearance: Clear / S.010 / pH: x  Gluc: x / Ketone: Trace  / Bili: Negative / Urobili: Negative mg/dL   Blood: x / Protein: 15 mg/dL / Nitrite: Negative   Leuk Esterase: Negative / RBC: 3-5 /HPF / WBC x   Sq Epi: x / Non Sq Epi: x / Bacteria: Occasional      Imaging:  ECG: SR, first degree AVB, LVH, 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    EXAM:  CHEST SINGLE VIEW                            PROCEDURE DATE:  04/10/2017        INTERPRETATION:  cough    A frontal chest film is severely rotated. Interstitial markings are   prominent    Heart is enlarged. Vascular markings are increased      No mediastinal or hilar adenopathy is suggested. .     The osseous structures appear intact intact.     IMPRESSION:  Severely rotated film. Cardiomegaly with congestive changes.    EXAM:  CT CERVICAL SPINE                          EXAM:  CT BRAIN                        PROCEDURE DATE:  2017    IMPRESSION:     CT BRAIN: No acute intracranial bleeding, mass effect, or shift.     Volume loss and chronic microvascular ischemic change.    CT CERVICAL SPINE: Diffuse osteopenia without displaced fracture.    Grade 2 C7-T1 anterolisthesis due to advanced facet degeneration.    C4-C5 degenerative cord compression and at least moderate canal stenosis   due to disc osteophyte complex and OPLL. If no prior imaging is available   for comparison, consider nonemergent MRI cervical spine to assess for   cord edema or myomalacia. Clinically correlate for myelopathy.      Echo from 9-11-15  Conclusions: CsvcuIxdcveg75Cqc ZhblhWyelmhf227Cndao   1. Normal right and left ventricular systolic function. Biatrial enlargement. Grade 1 diastolic dysfunction. TrqlqGlqvbbd247Noz RfloxTzivzhn233Gntce GmlyzFvhobcr776Njn RfyhyZhkhnuy242Qmuzr   2. Moderate tricuspid insufficiency. Mild mitral and aortic insufficiency. Mild aortic stenosis.   3. No pericardial effusion. ZqcbfBuaqrzv449Isp FkwwgBzkpfty353Bxvhh LnvcoWvupueb213Wjr ZxveiAvmfqzf721Linta XeqvyZmtkdms962Eck GipxnCjntubf528Jxkjk JdkqiGkhpigt993Cdj YjkfpJcniwzn809Pccek 4. As compared to previous study dated 14, no significant interval changes are seen. XbgypDjadvvl747Nss Lumlinsvsccckjby0k7c2d-9l2y-5v8o-0y1t-jl7q12zu0z8yCciiPbz      Echo from 2017  EF 60-65  Mild / moderate AS with mild AI  Mild TR, Trace MR/VT    Assessment: 89 y/o Man withf dementia, b/l hip surgery brought in for worsening agitation accompanied by son. Son states for past 2-3 days his dementia/confusion has gotten worse to the point he is not making sense and unable to ambulate well. The son heard a thump upstairs and found pt on the floor attempting to get up; he had a mild head contusion, but no active bleeding. No history of seizures. History is limited due to dementia, pt required sedation for agitation in ED. NSTEMI vs. demand ischemia.    Plan:     Tele monitoring.  Con't with:  aspirin enteric coated 81milliGRAM(s) Oral daily  heparin  Injectable 5000Unit(s) SubCutaneous every 12 hours  sodium chloride 0.9%. 1000milliLiter(s) IV Continuous <Continuous>  OLANZapine 5milliGRAM(s) Oral every 12 hours  Re start statin and imdur.    Last office Visit Note from 17:    Reason For Visit  WcimaewfjwQJK3mwn2ut9-3iib-18v8-j911-51z02s17ul43LskuQfacw JpgtgRfzmkoe3Ujhek ROLY CASIANO is being seen for a clinic follow-up of coronary artery disease and Heart murmur. TczufPjztydw7Ghu FudyrGovbsaj7Ztyrh OqjcwLwaelau1Sxh IeueCkheyil37Ywbaa TabaTcbvpwm59Rps HvrkfiJjvrxgf30Dteim ZtfinrNarikgx00Bcs LjdnuahnlbSLH4nkf8tq3-9fsx-91o1-p584-66o00b12ui02DialIqd        ReasonForVisitSectionEnd  HistoryofPresentIllnessSectionStart History of Present Illness  NhyhlcqiiMGFjs432355-n66x-0ttl-hioc-139439d2m810NompBakgw TcxkRezrQecdk1Fzhan 89-year-old male followed for history of coronary artery disease, Also followed for history of diastolic heart failure, hypertension, hyperlipidemia and aortic stenosis.   Roly has become quite hard of hearing and it was difficult to get an adequate history. He claims he is compliant with his medications however he can't recall which medications he is taking or how many. He denies any recent chest pain or shortness of breath. His blood pressures, which have been somewhat poorly controlled in the past were extremely high today.No recent labs have been drawn. RmjcOdmiEuqdy3Oxc SbzkdyodrALJaa664741-c01v-4lys-wbzb-241567z7s141WysbCpz        HistoryofPresentIllnessSectionEnd  CardiologySummarySectionStart Cardiology Summary  GjjqdrailhYbekhykf925w4ql-ue28-695z-x483-e9g163s82084OyiwDocxj VyxfvCwwjdtx4Tairz   EK17, Sinus Bradycardia -First degree A-V block Voltage criteria for LVH (R(I)+S(III) exceeds 2.50 mV) -Voltage criteria w/o ST/T abnormality may be normal.  -consider old anterior infarct.  -Nonspecific ST depression + Nonspecific T-abnormality WpzrgDrddejc0Wtg IlgzyFnnlleo50Czmth   Stress Test: 4/10, no Ischemia, no exercise induced arrhythmias, no Symptoms BnpwtXzmmcwc50Yim YzaboSmieyzu60Dqxpf   Echo: 14, Mild AS, AI, MR. Moderate TR. Mild pulmonary hypertension. LVEF 50%. 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 ApzjuIvgtstz5Oyg SoojoEneyjcf01Lbnot NsddzJupsrrn16Yie ByupwQjcczae76Uvjzi MqmhrGahflti44Bip YpkgjXxypjhv83Zthrz LmddoDjjodwb70Pht WqzyuMlnsuvd82Pzjwr XwbdvLyaabie83Cng FmconCfanzlp61Nkprz NnztzOhkvbwh36Kqx AlenvEwzcywz55Hzvjs EpdgfUtavyqm95Mwf NcnmlTncogwt60Nufhl FhnquOhrigpr58Ari JohpjZzzxroy60Rptnl RpvetMxignot08Vlw DmbgjOnitsdu34Zrlgp XpqgkXouvdoy73Pqr FheicVukrzti69Bjhnj RzfqbHyiatif55Teu CyieyAmjufai29Gyeft ZcrngOkvxvii17Ump SfcifjzteiTgjetemb070d5gl-gc47-301q-z508-g7b332q86915IbdtSna        CardiologySummarySectionEnd  ActiveProblemsSectionStart Active Problems  ActiveProblems_20_twCiteListControlStart Aortic stenosis, moderate (424.1) (I35.0)  Bilateral hearing loss (389.9) (H91.93)  Chronic diastolic heart failure (428.32) (I50.32)  HTN (hypertension) (401.9) (I10)  Hyperlipidemia (272.4) (E78.5)  Systolic murmur (785.2) (R01.1)  ActiveProblems_20_twCiteListControlEnd     ActiveProblemsSectionEnd  PastMedicalHistorySectionStart Past Medical History  PastMedicalHistory_20_twCiteListControlStart History of Elevated cholesterol (272.0) (E78.00)  History of H/O cardiac catheterization (V45.89) (Z98.890)  History of H/O total hip arthroplasty (V43.64) (Z96.649)  History of angina pectoris (V12.59) (Z86.79)  History of sinus bradycardia (V12.59) (Z86.79)  History of Osteoarthritis, hip, bilateral (715.95) (M16.0)  PastMedicalHistory_20_twCiteListControlEnd     PastMedicalHistorySectionEnd  SurgicalHistorySectionStart Surgical History  SurgicalHistory_20_twCiteListControlStart History of Total Hip Replacement  SurgicalHistory_20_twCiteListControlEnd     SurgicalHistorySectionEnd  FamilyHistorySectionStart Family History  FamilyHistory_20_twCiteListControlStart Family history of Hearing loss, unspecified laterality : Father  No pertinent family history : Mother  FamilyHistory_20_twCiteListControlEnd     FamilyHistorySectionEnd  SocialHistorySectionStart Social History  SocialHistory_20_twCiteListControlStart Former smoker (V15.82) (Z87.891)    Never smoker  Non-smoker (V49.89) (Z78.9)  Social alcohol use (Z78.9)  SocialHistory_20_twCiteListControlEnd     SocialHistorySectionEnd  CurrentMedsSectionStart Current Meds  CurrentMeds_4_twCiteListControlStart Aspirin 325 MG Oral Tablet  Crestor 40 MG Oral Tablet; TAKE 1 TABLET BY MOUTH EVERY DAY  Fenofibric Acid 135 MG Oral Capsule Delayed Release; TAKE 1 CAPSULE BY MOUTH  DAILY  Fenofibric Acid CPDR  Isosorbide Mononitrate ER 60 MG Oral Tablet Extended Release 24 Hour; TAKE 1  TABLET BY MOUTH EVERY DAY  CurrentMeds_4_twCiteListControlEnd     CurrentMedsSectionEnd  AllergiesSectionStart Allergies  Allergies_20_twCiteListControlStart No Known Drug Allergies  Allergies_20_twCiteListControlEnd     AllergiesSectionEnd  HistoryReviewedSectionStart History Reviewed  QngxkujJekccrsp82407646-80v0-03t5-3048-svqn1wfrbv53KhdnFnits FlhxVlbnlpw9Znskv History reviewed. DcgmKsvpnwz1Tpc WdxcEfzgwdk4Hlaym Medications and allergies reviewed. RokyDkjmqre9Oes SvrnqksVeolbvek91492003-55h2-31q0-0469-llkw0owglz56YjowIet        HistoryReviewedSectionEnd  ReviewofSystemsSectionStart Review of Systems  Dltxjrbi3888ic2s-26q5-593n-no11-ou3nh24w1x1mOimuBatxc IfuxgOtntbgk9Tuxbt FkwdcRfddkqg2Tel VsnrcAweuzfg9Voivm CleoyGjblsxe0Vzw NrhnlEsetbrp2Wvzwu XwhtmHrrdkpi9Wad FnifsWwayzah1Cfwwc TfamnSibcywy4Uaf BhgaaGomovej72Pbhux DdxohOkcrghj53Ibm VumzlGzzikne19Qrjeg AumklDabccez01Xiu IgdfmXoawyxz84Cmwwr AqkmrHuemyzd58Sxt QrmxbMlzszsu43Glwxi VfntgEcbeydf50Rzg FsmqfFdsbznz82Kqcvs ToudpKscpkmp26Jsr WrasxVrpelmx9Wcyfk UutrbSnfvxmy2Zkt HcppnLutilkm5Uamew UdylmIsaktgr6Wcw KfcpjVcnqfcw3Maope HpbjeSdutkso7Rbu BqmugLejrcsd4Wldbs UrlakWiduzac4Qnr MfivbPwvqwyx29Ehain QfdrqFeatbuq93Nlu RuzdhSlfzkxk53Zojqk   Constitutional, Eyes, ENT, Cardiovascular, Respiratory, Gastrointestinal, Genitourinary, Musculoskeletal, Integumentary, Neurological, Psychiatric, Endocrine and Heme/Lymph are otherwise negative. HpbvsZnwlkcu78Tod Wgwmpqta7189hx1f-65t7-902f-ve35-nu1wd33f6x1cDakbYuy        ReviewofSystemsSectionEnd   VitalsSectionStart Vitals  Vitals_110_twCiteListControlStart Vital Signs    ** Printed in Appendix #1 below.   Vitals_110_twCiteListControlEnd     VitalsSectionEnd  PhysicalExamSectionStart Physical Exam  Constitutional(Brief)JXKAS6w1i200o-7o87-134q-20l3-7dq66ezt6s32VxivAwasn VStart Constitutional: well developed, normal appearance, well groomed, well nourished, no deformities and no acute distress. VEnd BhjmpGzfpoha5Bozur PzzzdFunoets5Lkp QjmbeQwejmes4Tycfn GocnkIxlilhk9Zdi IjujbUsdsnxe3Nqzxd FugbcKyhtiyu6Chc JzwknZftquhq3Waxpf SslkeJwcgrlm3Moi Constitutional(Brief)DRFEC6z5v292i-0f59-895f-09g8-1re10oeq9o72QvmaWhy Eye(Brief)GEVCW20296420-964c-6603-o051-571f34j7hg5lNhjfAelpi   VStart Eyes: the conjunctiva exhibited no abnormalities and the eyelids demonstrated no xanthelasmas. VEnd BomhiVvzqthz3Cghap MebdzMuldnrw4Ior Eye(Brief)EFRWV13363652-658p-3694-r468-114z91z5zg8aFbysFnt HEENT(Brief)RFBTY254u1n2k-82pw-428x-w765-cn7e31921037PugvDttcc   QgodmFoeibju6Jmwzt HEENT: normal oral mucosa, no oral pallor and no oral cyanosis. SjsbxBoowulb0Isu WnjofJhcvduv4Cdipz IdayoNswbvcq9Cap CzmjSmhvSxsew1Vbqcz VaodJtihMkmop0Mtn HEENT(Brief)TMXNI977q4o7d-06ja-043j-n734-eb7d38086883WcxzHsv Neck(Brief)VYEZD431ik764-s08x-7xnr-i1i7-la409778541zGhtgKadxq   NjqejBvadyzd47Jtzhu Neck: normal jugular venous A waves present, normal jugular venous V waves present and no jugular venous lema A waves. VehieQvypudk03Sld LvqgqDqiajfg7Oxock VjxkkStvcglw3Ymo Neck(Brief)CUPFW326np661-d07q-8yrx-q4s9-un267401498cKtnwFte Pulmonary(Brief)XQTOUlun557z4-5nqb-33a2-83t5-d0g7344zspocXojyGvjin   NzjjkJnakvtg32Jfydb Pulmonary: no respiratory distress, normal respiratory rhythm and effort, no accessory muscle use and lungs were clear to auscultation bilaterally. QazizOmgxztg04Enk IcpbpCqngxjh87Kswdh JbqutAflkoeb41Aut LdglbXtlnrto02Vhytd KzgxsNatqspo62Hpb CljsjErhhses37Ypkff ZkkeuPtjwkri80Izh WgjlbPhmwnff30Infvn KbwfrQpasyog61Ppd JglqaDoktbnl41Xoqjz TwgetBmogdhv64Rgn DjkirRbzebao11Umrgk UurjvSstasrb58Xgd FqgywJaijnkm8Ttygv JlsnmSuqacky2Zjo Pulmonary(Brief)DRDUSqvr819o7-6xtj-11u7-11y7-z1a4483vgscaTlndTrn Abdomen(Brief)RNYXF07zo92y1-67h0-5nz4-56s6-qjxa68o37u7sSipdCabql   VStart Abdomen: soft, non-tender, no hepato-splenomegaly and no abdominal mass palpated. VEnd YxzhfAyksbkv6Sclbh NqpttPknwhdz8Nro QudfyOjnmpbq3Pnkxv BjizoPuoxnou2Qqi AidmlHwcnrvv1Ujrdc VoxgjTdsjyyi5Fqv CdgwqFtamfhr5Mabhl RercvDzsvtgu7Prf LrwewGprtlhq8Lhpgc FulxdAsegxfn9Hxm XhrwxYpsugqd0Ovlec GnaptNndkgwf6Dbz DclmtAgdevuq6Jctaw WwpmrTfpbtoa7Fyy Abdomen(Brief)GQGUG80qh12j8-59b0-4dm1-15s0-fyek15s90q6dByydTlb Musculoskeletal(Brief)NBSAA016l6730-8phn-8667-f0q1-b912y858vac6EfrbHcykr   RebdgRfnkxno3Lfnxl Musculoskeletal: normal gait . the gait was sufficient for exercise testing. YhgouKbmztua3Grr RrpnwEtwxgml5Alsng WklzvIpyempw6Vol XjoioFfcvhad8Fvyjh FtjkkSqqngif3Ypf Musculoskeletal(Brief)SXQFB291n0833-5iyj-2458-k2n2-l911m991bvk8TywtDsj Extremities(Brief)HBADA4437u3rh-3y0o-0fp7-1193-02887v846808LgimUdply   WkuinKrlihwr5Sgxin Extremities: no clubbing of the fingernails, no localized cyanosis, no petechial hemorrhages and no ischemic changes. WqnarNkghoaa5Qpb EpcosKbhnjbp3Yfqjo KuahgInagdnq9Cor CybzkCelxvjj2Hmyqu MsuxnKgloepn9Jvo LxinkSvfsylt4Woqxu HrolgPexlsbr0Dti UilvjQmcipjx3Hzrji GiepqUzlrwqp8Ymu JzqvbHedwzat3Zhdyy FnwrrKvqeugl6Rxv AibfsZavfkyw1Uctom PoogoZlfoarj8Uhj VnnlmMrwuahk8Hhntt LvrvbXhihtrb7Nkg Extremities(Brief)YXPPZ5193z7vb-4i5n-9kg2-0188-90005o754003GdyrYxx Skin(Brief)GLELG7pc02ux9-7d3c-983n-c7wq-8irsjz11g2ejPzuaUapst   ZuobxDpwswyu7Nmqzb Skin: normal skin color and pigmentation, no rash, no venous stasis, no skin lesions, no skin ulcer and no xanthoma was observed. EuvkcDnfbhxa8Zow HcfufAjfzuub6Rovym OxuigImzbxrc2Beh TefyvYzxdfxm5Jbkle PlxrwNlxalcn7Yyj ZumomCtufgya6Wujln NgkgwNufpdev6Dcn IpbcoNzqijue5Qjrzb FnvucLqfwuek3Dkb NyctyNcmalys1Okhfg JuuufUcfmvod8Clk Skin(Brief)UCLJD9uy59dl8-5x3x-797q-z4pv-0eclaz51d8unIkikIcz Psychiatric(Brief)GSBAKb417315a-s5h7-9433-v525-w7fi53jp8844OqzeDypkx   LxdihGnxnfjx2Ckxkk Psychiatric: oriented to person, place, and time, the affect was normal, the mood was normal and not feeling anxious. ZlsejFdtmyvl9Got UqrvxPhydzlr8Vecyi HvjjtSkutfzh6Wnu ZsdgrJqpjmmf9Catjn DoshdUgnghrf7Eng MtejuHyaxkwm7Ixcyk TeofdKpaggjl2Baj OuxhzVlyaigz9Qpuno YvsruQvhblyn9Rdh JctbqCuyntdz3Aucdd VzrweSkjnhlz6Qoh GgnkzNuwmrfu9Gypyq PratgHijrbso4Lsv SefszPincmoj39Oxodi QdhgdEsqfuhu20Exd Psychiatric(Brief)KXBVIu901245y-v1a0-1743-m321-f3ui33nt1576AnvpPwd QvvpezsNQJQDJdeokel00drc-l4f5-321s-873k-3z992n8o93x0MfoaOrhkw   FmezcOtnfezr84Pbmca Cardiovascular: GtorsKumdnlo73Afm XmpkxBhkozro60Pfnio EpsxeXzluuzz88Gst CsbuiAqfqsre08Kylwi The PMI was palpated at the 5th LICS in the midclavicular line. MragpWedvmbf90Txn FhvluIewtvpv5Dwiez The apical impulse was dyskinetic. EpofrErbzezq7Fga QyhoeCkjydpi53Rjpfq a precordial heave was noted at the left lower parasternal line. TlmqrImofyhy52Hfn WcuwmWwzmzof0Utglc WhfxpVmkzavf1Nsh TruamYiboqzj3Dsmaw The heart rate was bradycardic, 52 bpm. OopswLkjqyde9Hzz BiqfhGjpsjos63Ahxsd The rhythm was regular. BeuraPnbcwcj49Qmi OmnqlRcyjdsf3Savks Heart sounds: abnormal S1, normal S2. CuwanUmufgrv8Rfj JifqyhHxzqvxi5117088886Dazgq DdqmzhJvnbriv8609090105Zhp WqcyoLdqlder65Fpnht FkrcyUhseidh05Agw FixpsIlwcmyj6Evmpm RevqfBvjykwq1Vqp NnezdTabnlaj28Wkstt FufmlUqsokqb13Bfi TfohnIlialdw42Igkdu GawmmUgsclbn76Tdw IyouuPzeefmq95Mcmjr MidhtXpcrayh57Ipm DzemxQfxvryp0Uuelx YbdcbOnqtuba6Wzg UeihlFxndazx22Qfkxd DuieyXgpwioj10Cbf UredmVyukspq47Emsee   A grade 2 systolic murmur was heard at the LUSB. XmxopDvhzokl22Wiu QankbDgbxxwj73Dshqh YzumoXnpifvh79Thz ZeyryBicpllc42Quhiv LfmpeZiietra26Hee DrbvaGhmjstc21Udlon GsyvyTlobkyu50Spw JotiwZbzeldp51Igzow WallbLobebad06Glt KkiisUnomqxv36Gvizq MqmakWnuiyrw13Vgv HkxqlLsybqml75Vfewa YrdevGxndbrv02Tdz BavptPpnkcfa48Avucj VgnbmSeuawnu40Ykg WdogtTghzivw28Fmxfl WrezjWnsujwq33Xvt XvkorIrtpezj91Miqzu OnmufEekuokz65Fxg BtxwfEfszfnc99Lplwb BobrcZoexire08Zuc RlxzuTzscygo43Mjonv MavqxUjlceel69Nwz OredhSecjcez06Swnwv WtvufIdzyens33Fgb JoyipKircgpx11Svasq RugqdQeibiyn62Xzg ZftheCpknqhe90Wgfet ShwfbYarqmhn52Xos DmrqfKosmqia31Llxxw CbppzWwgczaf01Mfu BdzpiJjmwwib57Eyqgr CedbaPowifei29Jcj JjrrqOamreyh86Msgcn   Pulses: no bruit heard over the right carotid and no bruit heard over the left carotid. QfwdeHfornhy49Yve GkdjzLlktbia89Zoaqt ZnslzPpvpsxs02Gtb TuwsbTmralpz55Tnday CivwvUysswjb64Inn QfbehAndfpfj08Rulup BmlhfRdsuewr05Fjf YlkwlThqtvtf50Utbuk MjbxcOwrvudt33Uku ZvscbGdodeob29Orvnw XickvQfubaal58Zie EcttrQkgosbd43Sbtou Femoral: right 2+ and left 2+. CazfmRvyzahk03Yia PfjhbPmweyrw88Hxqpp ZtxmrYhdwefg48Sea AekjbPegkacp69Kfznk OsjtcLfdxxwq46Ile CebukUgfvegn56Nxyov BghokXzshhvr53Sif RuhlqNwwanrx52Sclew LfnisToyldjg57Cqq NyeqgVnlcggy70Qwqmq UgduwHjnatji87Tni AudeqQwiicvl88Urpme Dorsalis pedis: right 2+ and left 2+. XmlvhGmistgr13Mcv FzjvnZijrclo54Edqmg GkbmiPioiacz02Kea QarduUndavwu48Jpmjl   Abdominal Aorta: the abdominal aorta was not enlarged and no bruit was heard, no bruit heard. HgmvySkatuie19Bdy NmmwrUmmqill76Qjirf   Lower Extremities: no varicose veins of the right leg and no varicose veins of the left leg. ZpssrEtmbjze38Rhu VsanjOeimfkf77Mcqiv NgewiKbsedky63Zxm OivyqXghfggv23Zzyxq QyotjRrdjfeb83Sut RgvhcLlofcic73Ypkhl IahwhUvpmttn03Zhv HkqfbKszsnht42Sqjvw LuagaCypmdya38Apc EwgopLlgygcu38Zbuuu WbdezOmvperp96Nmz KxwsaZbvhzug79Nldao FifzkOsqlrda25Yty YioeuLdodjic81Ebmvh ZciwsWkgobvq80Vtc WmzdogfKKSUKIyxmahm42ajx-z8o9-946t-032e-1u631m6o61h9SmqyEtr        PhysicalExamSectionEnd  AssessmentSectionStart Assessment  Assessment_80_twCiteListControlStart Aortic stenosis, moderate (424.1) (I35.0)  Bilateral hearing loss (389.9) (H91.93)  Chronic diastolic heart failure (428.32) (I50.32)  HTN (hypertension) (401.9) (I10)  Hyperlipidemia (272.4) (E78.5)  Systolic murmur (785.2) (R01.1)  Assessment_80_twCiteListControlEnd     AssessmentSectionEnd  PlanSectionStart Plan  Plan_2_twCiteListControlStart Aortic stenosis, moderate, Chronic diastolic heart failure, Hyperlipidemia   Complete Blood Count w DIFF; Status:Active; Requested for:73Ozj0354;   Comprehensive Metabolic Panel; Status:Active; Requested for:39Unf1728;   C-Reactive Protein Cardiac; Status:Active; Requested for:2017;   Free T3; Status:Active; Requested for:2017;   Free T4; Status:Active; Requested for:2017;   Hemoglobin A1C, Whole Blood; Status:Active; Requested for:2017;   Lipid Profile; Status:Active; Requested for:2017;   Magnesium, Serum; Status:Active; Requested for:2017;   Thyroid Stimulating Hormone, Serum; Status:Active; Requested for:2017;   Vitamin D, 25-Hydroxy; Status:Active; Requested for:2017;   HTN (hypertension)   Echocardiogram; Status:Hold For - Scheduling; Requested for:2017;   Follow Up___ Outpatient  .with nurse practitioner  Status: Hold For - Scheduling   Requested for: 2017  Plan_2_twCiteListControlEnd     PlanSectionEnd  Discussion/SummarySectionStart Discussion/Summary  CardiologyD/[id number] XjagoTholenw9Jhfep HnisaCkudnpe4Qii BivqrHfwpdbp38Ffkxo FpqftWgsjsme52Ywu ApmxyNpqccyl4Cchyj ZvzvlZchtstw4Dgb GobugXjfdcsz41Ccakz OzovqVdcmwps77Fcq PpbkzXqppprm1Jabba CoddcSwickbk4Zjk DyqjbOyemtzu3Soaeu SuqbyAloibir6Dxe HvxgbOksaqcj8Trmuq KvqjrVlxxhrs9Iwh BegquFqwopdp6Pmysc XjlmhSughgol4Qan JlnbfZtfwepq5Gzffn UhgcaXdiibqr3Zjo RbshvEcrmsqb20Miacy   Coronary Artery Disease Discussion/Summary:   Impression: coronary artery disease. Currently, this condition is stable. Medication Changes: none. OjvtcTprmpiu00Mbj IjrwqWbyrmzf6Vjmkm NdbjpSzxqlmq0Sju OpeqeNizldlv21Jjcqx UjuszZobfunm11Iuc BhzvxTfccppe8Arosh   Hyperlipidemia Discussion/Summary:   The impression is hyperlipidemia. Currently, this condition is stable. The diagnostic plan includes a fasting lipid profile. QlvqtVswphmp2Akd BkftaQkmvevl53Uovaj   Hypertension Discussion/Summary: The impression is hypertension. Currently, the condition is stable. Medication Changes: none. SspohWxmqfjk34Syr SiqopNkwsyyv45Rfogt FeetuRqmqdup79Xii NdeatYldssin91Ytjsi VxolnIjbitor05Krc KnddgVxyfdja34Vqrry KuxtfXoxcrkr41Hmy EybfgUkqpiho11Waltr   Pulmonary Hypertension Discussion/Summary: The impression is pulmonary arterial hypertension. Currently, the condition is stable. Medication Changes: none. JsrzgQtfkofx42Dxy IqstoXptykli95Zrbto FqsdqHifvueq94Oah DewxkEbfxszt66Yhphw ZrediChpmkck52Xwq LdwtvWzrmida33Rgrvv KfjvtTqcqqge64Eff ZbaaiXbbqryc58Wjnfk QdwklZkudeaq57Sum WdypgYdyvewu85Ehdgg UynigNjxogbm31Jut PakfJsqjKkezs5Btsvx QqwjJnhzUaxqo9Qsq ZyitmSspkzbd45Ovnnx   The treatment plan was discussed with for 20 minutes. WezykQrsiimb99Zyd MthiFuvnUhvtf2Tjpsh VhqlGtecPpqoy2Fsh KmfxiPjcvfsj15Bvppj   Patient is instructed to follow up in 1 month(s) with me. LhljvUomvjfg97Wrm CardiologyD/[id number] Fckrctfioe3320wvd-82kq-4246-121y-42z9g1p5m2ebKybqWqrme   KgjhvZxerwpb6Zkoua Poorly controlled hypertension, patient denies any blood pressure issues. Will review his meds and repeat low pressures early next week along with an echocardiogram to reevaluate his aortic stenosis. Labs were drawn today. SebhlHixxwjj7Pvf QscjmIipesvn2Gcxax

## 2017-04-12 NOTE — PHYSICAL THERAPY INITIAL EVALUATION ADULT - MODIFIED CLINICAL TEST OF SENSORY INTEGRATION IN BALANCE TEST
Barthel Index: Feeding Score 0___, Bathing Score _0__, Grooming Score _0__, Dressing Score _0__, Bowels Score __0_, Bladder Score __0_, Toilet Score __0_, Transfers Score __0_, Mobility Score __0_, Stairs Score _0__,     Total Score ___

## 2017-04-12 NOTE — PROGRESS NOTE ADULT - ASSESSMENT
dented 89 year old male with increasing confusion, not eating, has cardiac disease. Elevated troponins, seen by cardiology. possible NSTMI. . patient has low grade fever.. clinically has poor skin turgor.   patient will need placement due to socail situation, [place] to talk with family.

## 2017-04-12 NOTE — PROGRESS NOTE ADULT - ASSESSMENT
a wake opne eyes does not follows commands  dementia confused ct head no acute path for sub acute rehab

## 2017-04-12 NOTE — PROGRESS NOTE ADULT - SUBJECTIVE AND OBJECTIVE BOX
Patient is a 89y old  Male who presents with a chief complaint of mental status change. (2017 10:52)  patient has not [id number] any sedation today, confused, trying to get out of bed.    INTERVAL HPI/OVERNIGHT EVENTS: No appetite not eating    MEDICATIONS  (STANDING):  aspirin enteric coated 81milliGRAM(s) Oral daily  heparin  Injectable 5000Unit(s) SubCutaneous every 12 hours  sodium chloride 0.9%. 1000milliLiter(s) IV Continuous <Continuous>  OLANZapine 5milliGRAM(s) Oral every 12 hours    MEDICATIONS  (PRN):  haloperidol    Injectable 5milliGRAM(s) IntraMuscular every 6 hours PRN Agitation  acetaminophen   Tablet 650milliGRAM(s) Oral every 6 hours PRN For Temp greater than 38 C (100.4 F)      Allergies    No Known Allergies    Intolerances        REVIEW OF SYSTEMS:  CONSTITUTIONAL: No fever, weight loss, or fatigue  EYES: No eye pain, visual disturbances, or discharge  ENMT:  No difficulty hearing, tinnitus, vertigo; No sinus or throat pain  NECK: No pain or stiffness  BREASTS: No pain, masses, or nipple discharge  RESPIRATORY: No cough, wheezing, chills or hemoptysis; No shortness of breath  CARDIOVASCULAR: No chest pain, palpitations, dizziness, or leg swelling  GASTROINTESTINAL: No abdominal or epigastric pain. No nausea, vomiting, or hematemesis; No diarrhea or constipation. No melena or hematochezia.  GENITOURINARY: No dysuria, frequency, hematuria, or incontinence  NEUROLOGICAL: No headaches, memory loss, loss of strength, numbness, or tremors  SKIN: No itching, burning, rashes, or lesions   LYMPH NODES: No enlarged glands  ENDOCRINE: No heat or cold intolerance; No hair loss  MUSCULOSKELETAL: No joint pain or swelling; No muscle, back, or extremity pain  PSYCHIATRIC: No depression, anxiety, mood swings, or difficulty sleeping  HEME/LYMPH: No easy bruising, or bleeding gums  ALLERY AND IMMUNOLOGIC: No hives or eczema    Vital Signs Last 24 Hrs  T(C): 38.2, Max: 38.2 (-12 @ 00:29)  T(F): 100.8, Max: 100.8 (04-12 @ 00:29)  HR: 82 (70 - 82)  BP: 158/90 (147/92 - 183/82)  BP(mean): --  RR: 18 (18 - 18)  SpO2: 98% (97% - 98%)    PHYSICAL EXAM:  GENERAL: confused, demented male, clinically dehydrated, with low grade fever.  HEAD:  Atraumatic, Normocephalic  EYES: EOMI, PERRLA, conjunctiva and sclera clear  ENMT: No tonsillar erythema, exudates, or enlargement; Moist mucous membranes, Good dentition, No lesions  NECK: Supple, No JVD, Normal thyroid  NERVOUS SYSTEM:  Alert & Oriented X3, Good concentration; Motor Strength 5/5 B/L upper and lower extremities; DTRs 2+ intact and symmetric  CHEST/LUNG: Clear to percussion bilaterally; No rales, rhonchi, wheezing, or rubs  HEART: Regular rate and rhythm; No murmurs, rubs, or gallops  ABDOMEN: Soft, Nontender, Nondistended; Bowel sounds present  EXTREMITIES:  2+ Peripheral Pulses, No clubbing, cyanosis, or edema  LYMPH: No lymphadenopathy noted  SKIN: No rashes or lesions. poor skin turgor.    LABS:                        11.4   6.3   )-----------( 246      ( 2017 06:47 )             31.6     04-12    145  |  111<H>  |  22  ----------------------------<  92  3.5   |  21<L>  |  0.92    Ca    8.4<L>      2017 06:47    TPro  6.2  /  Alb  3.4  /  TBili  0.5  /  DBili  x   /  AST  34  /  ALT  21  /  AlkPhos  80  04-10        Urinalysis Basic - ( 2017 02:45 )    Color: Yellow / Appearance: Clear / S.010 / pH: x  Gluc: x / Ketone: Trace  / Bili: Negative / Urobili: Negative mg/dL   Blood: x / Protein: 15 mg/dL / Nitrite: Negative   Leuk Esterase: Negative / RBC: 3-5 /HPF / WBC x   Sq Epi: x / Non Sq Epi: x / Bacteria: Occasional      CAPILLARY BLOOD GLUCOSE      CARDIAC MARKERS ( 2017 06:47 )  4.670 ng/mL / x     / 653 U/L / x     / 9.7 ng/mL  CARDIAC MARKERS ( 2017 23:43 )  4.490 ng/mL / x     / x     / x     / x      CARDIAC MARKERS ( 2017 14:09 )  3.170 ng/mL / x     / x     / x     / x      CARDIAC MARKERS ( 2017 05:52 )  2.100 ng/mL / x     / x     / x     / x      CARDIAC MARKERS ( 10 Apr 2017 22:11 )  .612 ng/mL / x     / x     / x     / x                RADIOLOGY & ADDITIONAL TESTS:    Imaging Personally Reviewed:  [ ] YES  [ ] NO    Consultant(s) Notes Reviewed:  [ ] YES  [ ] NO    Care Discussed with Consultants/Other Providers [ ] YES  [ ] NO    Care discussed with family,         [  ]   yes  [  ]  No    imp:    stable[ ]    unstable[  ]     improving [   ]       unchanged  [  ]                Plans:  Continue present plans  [ x ] increase Iv fluids. try to hold sedation.                 New consult [  ]   specialty  .......               order test[  ]    test name.   chest x-ray to r/o PNA . labs in Am                Discharge Planning  [  ]

## 2017-04-13 DIAGNOSIS — I10 ESSENTIAL (PRIMARY) HYPERTENSION: ICD-10-CM

## 2017-04-13 DIAGNOSIS — S01.01XD LACERATION WITHOUT FOREIGN BODY OF SCALP, SUBSEQUENT ENCOUNTER: ICD-10-CM

## 2017-04-13 DIAGNOSIS — R33.8 OTHER RETENTION OF URINE: ICD-10-CM

## 2017-04-13 LAB
APPEARANCE UR: CLEAR — SIGNIFICANT CHANGE UP
BASOPHILS # BLD AUTO: 0.1 K/UL — SIGNIFICANT CHANGE UP (ref 0–0.2)
BASOPHILS NFR BLD AUTO: 1.2 % — SIGNIFICANT CHANGE UP (ref 0–2)
BILIRUB UR-MCNC: NEGATIVE — SIGNIFICANT CHANGE UP
COLOR SPEC: YELLOW — SIGNIFICANT CHANGE UP
DIFF PNL FLD: ABNORMAL
EOSINOPHIL # BLD AUTO: 0.3 K/UL — SIGNIFICANT CHANGE UP (ref 0–0.5)
EOSINOPHIL NFR BLD AUTO: 4.5 % — SIGNIFICANT CHANGE UP (ref 0–6)
GLUCOSE UR QL: NEGATIVE MG/DL — SIGNIFICANT CHANGE UP
HCT VFR BLD CALC: 36.4 % — LOW (ref 39–50)
HGB BLD-MCNC: 12.6 G/DL — LOW (ref 13–17)
KETONES UR-MCNC: ABNORMAL
LEUKOCYTE ESTERASE UR-ACNC: NEGATIVE — SIGNIFICANT CHANGE UP
LYMPHOCYTES # BLD AUTO: 1.2 K/UL — SIGNIFICANT CHANGE UP (ref 1–3.3)
LYMPHOCYTES # BLD AUTO: 16.5 % — SIGNIFICANT CHANGE UP (ref 13–44)
MCHC RBC-ENTMCNC: 30.3 PG — SIGNIFICANT CHANGE UP (ref 27–34)
MCHC RBC-ENTMCNC: 34.6 GM/DL — SIGNIFICANT CHANGE UP (ref 32–36)
MCV RBC AUTO: 87.5 FL — SIGNIFICANT CHANGE UP (ref 80–100)
MONOCYTES # BLD AUTO: 0.9 K/UL — SIGNIFICANT CHANGE UP (ref 0–0.9)
MONOCYTES NFR BLD AUTO: 13.3 % — SIGNIFICANT CHANGE UP (ref 2–14)
NEUTROPHILS # BLD AUTO: 4.5 K/UL — SIGNIFICANT CHANGE UP (ref 1.8–7.4)
NEUTROPHILS NFR BLD AUTO: 64.5 % — SIGNIFICANT CHANGE UP (ref 43–77)
NITRITE UR-MCNC: NEGATIVE — SIGNIFICANT CHANGE UP
PH UR: 6 — SIGNIFICANT CHANGE UP (ref 4.8–8)
PLATELET # BLD AUTO: 287 K/UL — SIGNIFICANT CHANGE UP (ref 150–400)
PROT UR-MCNC: 100 MG/DL
RBC # BLD: 4.16 M/UL — LOW (ref 4.2–5.8)
RBC # FLD: 13.6 % — SIGNIFICANT CHANGE UP (ref 11–15)
RBC CASTS # UR COMP ASSIST: SIGNIFICANT CHANGE UP /HPF (ref 0–4)
SP GR SPEC: 1.01 — SIGNIFICANT CHANGE UP (ref 1.01–1.02)
UROBILINOGEN FLD QL: NEGATIVE MG/DL — SIGNIFICANT CHANGE UP
WBC # BLD: 7 K/UL — SIGNIFICANT CHANGE UP (ref 3.8–10.5)
WBC # FLD AUTO: 7 K/UL — SIGNIFICANT CHANGE UP (ref 3.8–10.5)
WBC UR QL: SIGNIFICANT CHANGE UP

## 2017-04-13 PROCEDURE — 99233 SBSQ HOSP IP/OBS HIGH 50: CPT

## 2017-04-13 PROCEDURE — 99232 SBSQ HOSP IP/OBS MODERATE 35: CPT

## 2017-04-13 RX ORDER — DIPHENHYDRAMINE HCL 50 MG
25 CAPSULE ORAL ONCE
Qty: 0 | Refills: 0 | Status: COMPLETED | OUTPATIENT
Start: 2017-04-13 | End: 2017-04-13

## 2017-04-13 RX ORDER — METOPROLOL TARTRATE 50 MG
25 TABLET ORAL
Qty: 0 | Refills: 0 | Status: DISCONTINUED | OUTPATIENT
Start: 2017-04-13 | End: 2017-04-20

## 2017-04-13 RX ORDER — HYDRALAZINE HCL 50 MG
10 TABLET ORAL ONCE
Qty: 0 | Refills: 0 | Status: COMPLETED | OUTPATIENT
Start: 2017-04-13 | End: 2017-04-13

## 2017-04-13 RX ADMIN — Medication 10 MILLIGRAM(S): at 06:42

## 2017-04-13 RX ADMIN — HALOPERIDOL DECANOATE 5 MILLIGRAM(S): 100 INJECTION INTRAMUSCULAR at 22:41

## 2017-04-13 RX ADMIN — HEPARIN SODIUM 5000 UNIT(S): 5000 INJECTION INTRAVENOUS; SUBCUTANEOUS at 17:45

## 2017-04-13 RX ADMIN — OLANZAPINE 5 MILLIGRAM(S): 15 TABLET, FILM COATED ORAL at 22:31

## 2017-04-13 RX ADMIN — Medication 25 MILLIGRAM(S): at 18:47

## 2017-04-13 RX ADMIN — HEPARIN SODIUM 5000 UNIT(S): 5000 INJECTION INTRAVENOUS; SUBCUTANEOUS at 06:33

## 2017-04-13 RX ADMIN — Medication 25 MILLIGRAM(S): at 01:34

## 2017-04-13 RX ADMIN — OLANZAPINE 5 MILLIGRAM(S): 15 TABLET, FILM COATED ORAL at 06:33

## 2017-04-13 RX ADMIN — Medication 81 MILLIGRAM(S): at 17:45

## 2017-04-13 NOTE — PROGRESS NOTE ADULT - PROBLEM SELECTOR PLAN 2
admit telemetry, serial cardiac enzymes, ASA  cardiology consult telemetry, ASA  cardiology follow up

## 2017-04-13 NOTE — PROGRESS NOTE ADULT - SUBJECTIVE AND OBJECTIVE BOX
Patient is a 89y old  Male who presents with  mental status change. (11 Apr 2017 10:52)      PAST MEDICAL & SURGICAL HISTORY:  Dementia  No significant past surgical history      INTERVAL HISTORY: Resting in bed, not in any acute distress,  	  MEDICATIONS:  MEDICATIONS  (STANDING):  aspirin enteric coated 81milliGRAM(s) Oral daily  heparin  Injectable 5000Unit(s) SubCutaneous every 12 hours  OLANZapine 5milliGRAM(s) Oral every 12 hours  sodium chloride 0.9%. 1000milliLiter(s) IV Continuous <Continuous>    MEDICATIONS  (PRN):  haloperidol    Injectable 5milliGRAM(s) IntraMuscular every 6 hours PRN Agitation  acetaminophen   Tablet 650milliGRAM(s) Oral every 6 hours PRN For Temp greater than 38 C (100.4 F)      Vitals:  T(F): 99.2, Max: 100.6 (04-12 @ 17:45)  HR: 79 (74 - 99)  BP: 164/92 (164/92 - 190/102)  RR: 18 (18 - 18)  SpO2: 96% (96% - 98%)  Wt(kg): --    I & Os for current day (as of 04-13 @ 17:06)  =============================================  IN:    Oral Fluid: 120 ml    Total IN: 120 ml  ---------------------------------------------  OUT:    Total OUT: 0 ml  ---------------------------------------------  Total NET: 120 ml    Weight (kg): 65.3 (04-11 @ 10:20)  BMI (kg/m2): 23.2 (04-11 @ 10:20)      PHYSICAL EXAM:  Neuro: Awake, responsive  CV: S1 S2 RRR  Lungs: CTABL  GI: Soft, BS +, ND, NT  Extremities: No edema    TELEMETRY:   	    RADIOLOGY: CHEST SINGLE VIEW                        Pulmonary vascular congestion.  Increasing right lower and left upper lung opacities.        DIAGNOSTIC TESTING:    [P ] Echocardiogram:     LABS:	 	    CARDIAC MARKERS:  Troponin I, Serum: 4.670 ng/mL (04-12 @ 06:47)  Troponin I, Serum: 4.490 ng/mL (04-11 @ 23:43)  Troponin I, Serum: 3.170 ng/mL (04-11 @ 14:09)  Troponin I, Serum: 2.100 ng/mL (04-11 @ 05:52)  Troponin I, Serum: .612 ng/mL (04-10 @ 22:11)          12 Apr 2017 06:47    145    |  111    |  22     ----------------------------<  92     3.5     |  21     |  0.92   11 Apr 2017 05:52    146    |  111    |  39     ----------------------------<  104    3.7     |  23     |  1.04   10 Apr 2017 22:11    144    |  108    |  46     ----------------------------<  122    3.8     |  23     |  1.39     Ca    8.4        12 Apr 2017 06:47                            12.6   7.0   )-----------( 287      ( 13 Apr 2017 09:20 )             36.4 ,                       11.4   6.3   )-----------( 246      ( 12 Apr 2017 06:47 )             31.6 ,                       10.3   6.8   )-----------( 223      ( 11 Apr 2017 05:52 )             30.2 ,                       10.9   6.9   )-----------( 237      ( 10 Apr 2017 22:11 )             31.9     TSH: Thyroid Stimulating Hormone, Serum: 0.655 uIU/mL (04-11 @ 14:09) Patient is a 89y old  Male who presents with  mental status change. (11 Apr 2017 10:52)      PAST MEDICAL & SURGICAL HISTORY:  Dementia  No significant past surgical history      INTERVAL HISTORY: Resting in bed, not in any acute distress, not agitated at present, denies any chest pain or SOB  	  MEDICATIONS:  MEDICATIONS  (STANDING):  aspirin enteric coated 81milliGRAM(s) Oral daily  heparin  Injectable 5000Unit(s) SubCutaneous every 12 hours  OLANZapine 5milliGRAM(s) Oral every 12 hours  sodium chloride 0.9%. 1000milliLiter(s) IV Continuous <Continuous>    MEDICATIONS  (PRN):  haloperidol    Injectable 5milliGRAM(s) IntraMuscular every 6 hours PRN Agitation  acetaminophen   Tablet 650milliGRAM(s) Oral every 6 hours PRN For Temp greater than 38 C (100.4 F)      Vitals:  T(F): 99.2, Max: 100.6 (04-12 @ 17:45)  HR: 79 (74 - 99)  BP: 164/92 (164/92 - 190/102)  RR: 18 (18 - 18)  SpO2: 96% (96% - 98%)    I & Os for current day (as of 04-13 @ 17:06)  =============================================  IN:    Oral Fluid: 120 ml    Total IN: 120 ml  ---------------------------------------------  OUT:    Total OUT: 0 ml  ---------------------------------------------  Total NET: 120 ml    Weight (kg): 65.3 (04-11 @ 10:20)  BMI (kg/m2): 23.2 (04-11 @ 10:20)      PHYSICAL EXAM:  Neuro: Awake, responsive  CV: S1 S2 RRR, 1/6 ISAÍAS +  Lungs: diminished to bases  GI: Soft, BS +, ND, NT  Extremities: No edema    TELEMETRY: RSR with few PVCs  	    RADIOLOGY: CHEST SINGLE VIEW                        Pulmonary vascular congestion.  Increasing right lower and left upper lung opacities.        DIAGNOSTIC TESTING:    [x ] Echocardiogram:     LABS:	 	    CARDIAC MARKERS:  Troponin I, Serum: 4.670 ng/mL (04-12 @ 06:47)  Troponin I, Serum: 4.490 ng/mL (04-11 @ 23:43)  Troponin I, Serum: 3.170 ng/mL (04-11 @ 14:09)  Troponin I, Serum: 2.100 ng/mL (04-11 @ 05:52)  Troponin I, Serum: .612 ng/mL (04-10 @ 22:11)          12 Apr 2017 06:47    145    |  111    |  22     ----------------------------<  92     3.5     |  21     |  0.92   11 Apr 2017 05:52    146    |  111    |  39     ----------------------------<  104    3.7     |  23     |  1.04   10 Apr 2017 22:11    144    |  108    |  46     ----------------------------<  122    3.8     |  23     |  1.39     Ca    8.4        12 Apr 2017 06:47                            12.6   7.0   )-----------( 287      ( 13 Apr 2017 09:20 )             36.4 ,                       11.4   6.3   )-----------( 246      ( 12 Apr 2017 06:47 )             31.6 ,                       10.3   6.8   )-----------( 223      ( 11 Apr 2017 05:52 )             30.2 ,                       10.9   6.9   )-----------( 237      ( 10 Apr 2017 22:11 )             31.9     TSH: Thyroid Stimulating Hormone, Serum: 0.655 uIU/mL (04-11 @ 14:09)

## 2017-04-13 NOTE — PROGRESS NOTE ADULT - SUBJECTIVE AND OBJECTIVE BOX
Patient is a 89y old  Male who presents with a chief complaint of mental status change. (2017 10:52)    HPI:  89 y/o Man withf dementia, b/l hip surgery brought in for worsening agitation accompanied by son. Son states for past 2-3 days his dementia/confusion has gotten worse to the point he is not making sense and unable to ambulate well. The son heard a thump upstairs and found pt on the floor attempting to get up; he had a mild head contusion, but no active bleeding. No history of seizures.  History is limited due to dementia, pt required sedation for agitation in ED. (2017 05:15)       INTERVAL HPI/OVERNIGHT EVENTS:        Allergies    No Known Allergies    Intolerances        ROS: all systems reviewed and wnl, except    Vital Signs Last 24 Hrs  T(C): 37.3, Max: 38.1 ( @ 17:45)  T(F): 99.2, Max: 100.6 ( @ 17:45)  HR: 79 (74 - 99)  BP: 164/92 (164/92 - 190/102)  BP(mean): --  RR: 18 (18 - 18)  SpO2: 96% (96% - 98%)  CAPILLARY BLOOD GLUCOSE      I & Os for current day (as of  @ 15:27)  =============================================  IN: 120 ml / OUT: 0 ml / NET: 120 ml          PHYSICAL EXAMINATION:  PHYSICAL EXAM:  GENERAL: NAD, well-groomed, well-developed  HEAD:  Atraumatic, Normocephalic  EYES: EOMI, PERRLA, conjunctiva and sclera clear  ENMT: No tonsillar erythema, exudates, or enlargement; Moist mucous membranes, Good dentition, No lesions  NECK: Supple, No JVD, Normal thyroid  NERVOUS SYSTEM:  Alert & Oriented X3, Good concentration; Motor Strength 5/5 B/L upper and lower extremities; DTRs 2+ intact and symmetric  CHEST/LUNG: Clear to percussion bilaterally; No rales, rhonchi, wheezing, or rubs  HEART: Regular rate and rhythm; No murmurs, rubs, or gallops  ABDOMEN: Soft, Nontender, Nondistended; Bowel sounds present  EXTREMITIES:  2+ Peripheral Pulses, No clubbing, cyanosis, or edema  LYMPH: No lymphadenopathy noted  SKIN: No rashes or lesions          LABS:                        12.6   7.0   )-----------( 287      ( 2017 09:20 )             36.4     04-12    145  |  111<H>  |  22  ----------------------------<  92  3.5   |  21<L>  |  0.92    Ca    8.4<L>      2017 06:47        Urinalysis Basic - ( 2017 11:52 )    Color: Yellow / Appearance: Clear / S.010 / pH: x  Gluc: x / Ketone: Moderate  / Bili: Negative / Urobili: Negative mg/dL   Blood: x / Protein: 100 mg/dL / Nitrite: Negative   Leuk Esterase: Negative / RBC: 0-2 /HPF / WBC 0-2   Sq Epi: x / Non Sq Epi: x / Bacteria: x      CARDIAC MARKERS ( 2017 06:47 )  4.670 ng/mL / x     / 653 U/L / x     / 9.7 ng/mL  CARDIAC MARKERS ( 2017 23:43 )  4.490 ng/mL / x     / x     / x     / x                    RADIOLOGY & ADDITIONAL TESTS:        MEDICATIONS  (STANDING):  aspirin enteric coated 81milliGRAM(s) Oral daily  heparin  Injectable 5000Unit(s) SubCutaneous every 12 hours  OLANZapine 5milliGRAM(s) Oral every 12 hours  sodium chloride 0.9%. 1000milliLiter(s) IV Continuous <Continuous>    MEDICATIONS  (PRN):  haloperidol    Injectable 5milliGRAM(s) IntraMuscular every 6 hours PRN Agitation  acetaminophen   Tablet 650milliGRAM(s) Oral every 6 hours PRN For Temp greater than 38 C (100.4 F)        ASSESSMENT AND PLAN:  MIGUEL Mederos  NON-ST-ELEVATION MYOCARDIAL INFARCTION, AGITATION: NON-ST-ELEVATION MYOCARDIAL INFARCTION, AGITATION  FELL LACERATION TO BACK OF HEAD: FELL LACERATION TO BACK OF HEAD  Dementia  NSTEMI (non-ST elevated myocardial infarction)  Fall at home, initial encounter: Fall at home, initial encounter  NSTEMI (non-ST elevated myocardial infarction): NSTEMI (non-ST elevated myocardial infarction)  Dementia with behavioral disturbance, unspecified dementia type: Dementia with behavioral disturbance, unspecified dementia type  Agitation  Dementia        Complete Blood Count + Automated Diff  Comprehensive Metabolic Panel  Troponin I, Serum  Culture - Urine  Urinalysis  12 Lead ECG  Xray Chest 1 View AP/PA.  CT Head No Cont  CT Cervical Spine No Cont  Xray Pelvis AP only  LORazepam   Injectable [completed]  haloperidol    Injectable [completed]  sodium chloride 0.9% Bolus [completed]  (Floorstock) [completed]  (Floorstock) [completed]  Manual Differential  LORazepam   Injectable [completed]  (Floorstock) [completed]  aspirin [discontinued]  aspirin Suppository [completed]  (ADM OVERRIDE) [completed]  Urine Microscopic-Add On (NC)  aspirin enteric coated  Diet, NPO [discontinued]  Troponin I, Serum [completed]  Troponin I, Serum  Basic Metabolic Panel  Complete Blood Count + Automated Diff  Troponin I, Serum  Troponin I, Serum  TTE Echo Doppler w/o Cont  heparin  Injectable  TTE Echo Doppler w/o Cont  (ADM OVERRIDE) [completed]  hydrALAZINE Injectable [completed]  (ADM OVERRIDE) [completed]  LORazepam   Injectable [completed]  (ADM OVERRIDE) [completed]  haloperidol    Injectable  OLANZapine Injectable [completed]  Complete Blood Count  Basic Metabolic Panel  Troponin I, Serum  Creatine Kinase, Serum  CKMB Mass Assay  Triiodothyronine, Total (T3 Total)  T4, Serum  Thyroid Stimulating Hormone, Serum  Vitamin B12, Serum  Syphilis Screen  sodium chloride 0.9%. [discontinued]  Diet, DASH/TLC  OLANZapine  acetaminophen   Tablet  Complete Blood Count + Automated Diff  Xray Chest 1 View AP/PA.  sodium chloride 0.9%.  diphenhydrAMINE   Capsule [completed]  hydrALAZINE Injectable [completed]  Culture - Urine  Urinalysis  Urine Microscopic-Add On (NC) Patient is a 89y old  Male who presents with a chief complaint of mental status change. (2017 10:52)    HPI:  89 y/o Man withf dementia, b/l hip surgery brought in for worsening agitation accompanied by son. Son states for past 2-3 days his dementia/confusion has gotten worse to the point he is not making sense and unable to ambulate well. The son heard a thump upstairs and found pt on the floor attempting to get up; he had a mild head contusion, but no active bleeding. No history of seizures.  History is limited due to dementia, pt required sedation for agitation in ED. (2017 05:15)       INTERVAL HPI/OVERNIGHT EVENTS:        Allergies    No Known Allergies    Intolerances        ROS: all systems reviewed and wnl, except    Vital Signs Last 24 Hrs  T(C): 37.3, Max: 38.1 ( @ 17:45)  T(F): 99.2, Max: 100.6 ( @ 17:45)  HR: 79 (74 - 99)  BP: 164/92 (164/92 - 190/102)  BP(mean): --  RR: 18 (18 - 18)  SpO2: 96% (96% - 98%)  CAPILLARY BLOOD GLUCOSE      I & Os for current day (as of  @ 15:27)  =============================================  IN: 120 ml / OUT: 0 ml / NET: 120 ml          PHYSICAL EXAMINATION:  PHYSICAL EXAM:  GENERAL: NAD, well-groomed, well-developed  HEAD: laceration occipital area  EYES: EOMI, PERRLA, conjunctiva and sclera clear  ENMT: No tonsillar erythema, exudates, or enlargement; Moist mucous membranes, Good dentition, No lesions  NECK: Supple, No JVD, Normal thyroid  NERVOUS SYSTEM:  Alert & Oriented X3, Good concentration; Motor Strength 5/5 B/L upper and lower extremities; DTRs 2+ intact and symmetric  CHEST/LUNG: Clear to percussion bilaterally; No rales, rhonchi, wheezing, or rubs  HEART: Regular rate and rhythm; No murmurs, rubs, or gallops  ABDOMEN: Soft, Nontender, Nondistended; Bowel sounds present  EXTREMITIES:  2+ Peripheral Pulses, No clubbing, cyanosis, or edema  LYMPH: No lymphadenopathy noted  SKIN: No rashes or lesions          LABS:                        12.6   7.0   )-----------( 287      ( 2017 09:20 )             36.4     04-12    145  |  111<H>  |  22  ----------------------------<  92  3.5   |  21<L>  |  0.92    Ca    8.4<L>      2017 06:47        Urinalysis Basic - ( 2017 11:52 )    Color: Yellow / Appearance: Clear / S.010 / pH: x  Gluc: x / Ketone: Moderate  / Bili: Negative / Urobili: Negative mg/dL   Blood: x / Protein: 100 mg/dL / Nitrite: Negative   Leuk Esterase: Negative / RBC: 0-2 /HPF / WBC 0-2   Sq Epi: x / Non Sq Epi: x / Bacteria: x      CARDIAC MARKERS ( 2017 06:47 )  4.670 ng/mL / x     / 653 U/L / x     / 9.7 ng/mL  CARDIAC MARKERS ( 2017 23:43 )  4.490 ng/mL / x     / x     / x     / x                    RADIOLOGY & ADDITIONAL TESTS:        MEDICATIONS  (STANDING):  aspirin enteric coated 81milliGRAM(s) Oral daily  heparin  Injectable 5000Unit(s) SubCutaneous every 12 hours  OLANZapine 5milliGRAM(s) Oral every 12 hours  sodium chloride 0.9%. 1000milliLiter(s) IV Continuous <Continuous>    MEDICATIONS  (PRN):  haloperidol    Injectable 5milliGRAM(s) IntraMuscular every 6 hours PRN Agitation  acetaminophen   Tablet 650milliGRAM(s) Oral every 6 hours PRN For Temp greater than 38 C (100.4 F)        ASSESSMENT AND PLAN:  MIGUEL RANGELyMale  NON-ST-ELEVATION MYOCARDIAL INFARCTION, AGITATION: NON-ST-ELEVATION MYOCARDIAL INFARCTION, AGITATION  FELL LACERATION TO BACK OF HEAD: FELL LACERATION TO BACK OF HEAD  Dementia  NSTEMI (non-ST elevated myocardial infarction)  Fall at home, initial encounter: Fall at home, initial encounter  NSTEMI (non-ST elevated myocardial infarction): NSTEMI (non-ST elevated myocardial infarction)  Dementia with behavioral disturbance, unspecified dementia type: Dementia with behavioral disturbance, unspecified dementia type  Agitation  Dementia        Complete Blood Count + Automated Diff  Comprehensive Metabolic Panel  Troponin I, Serum  Culture - Urine  Urinalysis  12 Lead ECG  Xray Chest 1 View AP/PA.  CT Head No Cont  CT Cervical Spine No Cont  Xray Pelvis AP only  LORazepam   Injectable [completed]  haloperidol    Injectable [completed]  sodium chloride 0.9% Bolus [completed]  (Floorstock) [completed]  (Floorstock) [completed]  Manual Differential  LORazepam   Injectable [completed]  (Floorstock) [completed]  aspirin [discontinued]  aspirin Suppository [completed]  (ADM OVERRIDE) [completed]  Urine Microscopic-Add On (NC)  aspirin enteric coated  Diet, NPO [discontinued]  Troponin I, Serum [completed]  Troponin I, Serum  Basic Metabolic Panel  Complete Blood Count + Automated Diff  Troponin I, Serum  Troponin I, Serum  TTE Echo Doppler w/o Cont  heparin  Injectable  TTE Echo Doppler w/o Cont  (ADM OVERRIDE) [completed]  hydrALAZINE Injectable [completed]  (ADM OVERRIDE) [completed]  LORazepam   Injectable [completed]  (ADM OVERRIDE) [completed]  haloperidol    Injectable  OLANZapine Injectable [completed]  Complete Blood Count  Basic Metabolic Panel  Troponin I, Serum  Creatine Kinase, Serum  CKMB Mass Assay  Triiodothyronine, Total (T3 Total)  T4, Serum  Thyroid Stimulating Hormone, Serum  Vitamin B12, Serum  Syphilis Screen  sodium chloride 0.9%. [discontinued]  Diet, DASH/TLC  OLANZapine  acetaminophen   Tablet  Complete Blood Count + Automated Diff  Xray Chest 1 View AP/PA.  sodium chloride 0.9%.  diphenhydrAMINE   Capsule [completed]  hydrALAZINE Injectable [completed]  Culture - Urine  Urinalysis  Urine Microscopic-Add On (NC) Patient is a 89y old  Male who presents with a chief complaint of mental status change. (2017 10:52)    HPI:  89 y/o Man withf dementia, b/l hip surgery brought in for worsening agitation accompanied by son. Son states for past 2-3 days his dementia/confusion has gotten worse to the point he is not making sense and unable to ambulate well. The son heard a thump upstairs and found pt on the floor attempting to get up; he had a mild head contusion, but no active bleeding. No history of seizures.  History is limited due to dementia, pt required sedation for agitation in ED. (2017 05:15)       INTERVAL HPI/OVERNIGHT EVENTS:        Allergies    No Known Allergies    Intolerances        ROS: all systems reviewed and wnl, except    Vital Signs Last 24 Hrs  T(C): 37.3, Max: 38.1 ( @ 17:45)  T(F): 99.2, Max: 100.6 ( @ 17:45)  HR: 79 (74 - 99)  BP: 164/92 (164/92 - 190/102)  BP(mean): --  RR: 18 (18 - 18)  SpO2: 96% (96% - 98%)  CAPILLARY BLOOD GLUCOSE      I & Os for current day (as of  @ 15:27)  =============================================  IN: 120 ml / OUT: 0 ml / NET: 120 ml          PHYSICAL EXAMINATION:  PHYSICAL EXAM:  GENERAL: NAD,  HEAD: atraumatic, no lacerations or lesions  EYES: EOMI, conjunctiva and sclera clear  ENMT: Moist mucous membranes,   NECK: Supple, No JVD, Normal thyroid  NERVOUS SYSTEM:  confused, moves all extremities  CHEST/LUNG: Clear  bilaterally; No rales, rhonchi, wheezing, or rubs  HEART: Regular rate and rhythm; No murmurs, rubs, or gallops  ABDOMEN: Soft, Nontender, Nondistended; Bowel sounds present  EXTREMITIES:   No clubbing, cyanosis, or edema   Rodgers catheter in place, examined with  RN at bedside          LABS:                        12.6   7.0   )-----------( 287      ( 2017 09:20 )             36.4     04-12    145  |  111<H>  |  22  ----------------------------<  92  3.5   |  21<L>  |  0.92    Ca    8.4<L>      2017 06:47        Urinalysis Basic - ( 2017 11:52 )    Color: Yellow / Appearance: Clear / S.010 / pH: x  Gluc: x / Ketone: Moderate  / Bili: Negative / Urobili: Negative mg/dL   Blood: x / Protein: 100 mg/dL / Nitrite: Negative   Leuk Esterase: Negative / RBC: 0-2 /HPF / WBC 0-2   Sq Epi: x / Non Sq Epi: x / Bacteria: x      CARDIAC MARKERS ( 2017 06:47 )  4.670 ng/mL / x     / 653 U/L / x     / 9.7 ng/mL  CARDIAC MARKERS ( 2017 23:43 )  4.490 ng/mL / x     / x     / x     / x                    RADIOLOGY & ADDITIONAL TESTS:        MEDICATIONS  (STANDING):  aspirin enteric coated 81milliGRAM(s) Oral daily  heparin  Injectable 5000Unit(s) SubCutaneous every 12 hours  OLANZapine 5milliGRAM(s) Oral every 12 hours  sodium chloride 0.9%. 1000milliLiter(s) IV Continuous <Continuous>    MEDICATIONS  (PRN):  haloperidol    Injectable 5milliGRAM(s) IntraMuscular every 6 hours PRN Agitation  acetaminophen   Tablet 650milliGRAM(s) Oral every 6 hours PRN For Temp greater than 38 C (100.4 F)        ASSESSMENT AND PLAN:  MIGUEL Mederos  NON-ST-ELEVATION MYOCARDIAL INFARCTION, AGITATION: NON-ST-ELEVATION MYOCARDIAL INFARCTION, AGITATION  FELL LACERATION TO BACK OF HEAD: FELL LACERATION TO BACK OF HEAD  Dementia  NSTEMI (non-ST elevated myocardial infarction)  Fall at home, initial encounter: Fall at home, initial encounter  NSTEMI (non-ST elevated myocardial infarction): NSTEMI (non-ST elevated myocardial infarction)  Dementia with behavioral disturbance, unspecified dementia type: Dementia with behavioral disturbance, unspecified dementia type  Agitation  Dementia        Complete Blood Count + Automated Diff  Comprehensive Metabolic Panel  Troponin I, Serum  Culture - Urine  Urinalysis  12 Lead ECG  Xray Chest 1 View AP/PA.  CT Head No Cont  CT Cervical Spine No Cont  Xray Pelvis AP only  LORazepam   Injectable [completed]  haloperidol    Injectable [completed]  sodium chloride 0.9% Bolus [completed]  (Floorstock) [completed]  (Floorstock) [completed]  Manual Differential  LORazepam   Injectable [completed]  (Floorstock) [completed]  aspirin [discontinued]  aspirin Suppository [completed]  (ADM OVERRIDE) [completed]  Urine Microscopic-Add On (NC)  aspirin enteric coated  Diet, NPO [discontinued]  Troponin I, Serum [completed]  Troponin I, Serum  Basic Metabolic Panel  Complete Blood Count + Automated Diff  Troponin I, Serum  Troponin I, Serum  TTE Echo Doppler w/o Cont  heparin  Injectable  TTE Echo Doppler w/o Cont  (ADM OVERRIDE) [completed]  hydrALAZINE Injectable [completed]  (ADM OVERRIDE) [completed]  LORazepam   Injectable [completed]  (ADM OVERRIDE) [completed]  haloperidol    Injectable  OLANZapine Injectable [completed]  Complete Blood Count  Basic Metabolic Panel  Troponin I, Serum  Creatine Kinase, Serum  CKMB Mass Assay  Triiodothyronine, Total (T3 Total)  T4, Serum  Thyroid Stimulating Hormone, Serum  Vitamin B12, Serum  Syphilis Screen  sodium chloride 0.9%. [discontinued]  Diet, DASH/TLC  OLANZapine  acetaminophen   Tablet  Complete Blood Count + Automated Diff  Xray Chest 1 View AP/PA.  sodium chloride 0.9%.  diphenhydrAMINE   Capsule [completed]  hydrALAZINE Injectable [completed]  Culture - Urine  Urinalysis  Urine Microscopic-Add On (NC)

## 2017-04-13 NOTE — PROGRESS NOTE ADULT - SUBJECTIVE AND OBJECTIVE BOX
Initial Consultaion Note  Requested by Name:  Date/ Time:  Reason for referral/ Consultation:    HPI:  89 y/o Man withf dementia, b/l hip surgery brought in for worsening agitation accompanied by son. Son states for past 2-3 days his dementia/confusion has gotten worse to the point he is not making sense and unable to ambulate well. The son heard a thump upstairs and found pt on the floor attempting to get up; he had a mild head contusion, but no active bleeding. No history of seizures.  History is limited due to dementia, pt required sedation for agitation in ED. (11 Apr 2017 05:15)      PAST MEDICAL & SURGICAL HISTORY:  Dementia  No significant past surgical history      SOCIAL HISTORY:                                 Admitted from: home [ ] SNF [ ] [facility] [ ] [facility] [ ]    )    ADVANCE DIRECTIVES:  [ ] YES [xx ] NO     DNR [ ] YES [ x] NO  Completed on:                       MOLST  [ ] YES [x ] NO   Completed on  :  Living Will  [ ] YES [x ] NO   Completed on:    Allergies    No Known Allergies    Intolerances        Review of Systems: lethargic and confused    PAIN : (Y/N) (0-10)          DYSPNEA:     NAUSEA/VOMITING:   DEPRESSION:        SECRETIONS:(    FRAILTY SYNDROM       FAILURE TO THRIVE     DIBILITY   OTHER SYMPTOMS :    UNABLE TO OBTAIN DUE TO POOR MENTATION (   )    PHYSICAL EXAM:  T(F): 99.2, Max: 100.6 (04-12 @ 17:45)  HR: 79 (74 - 99)  BP: 164/92 (164/92 - 190/102)  RR: 18 (18 - 18)  SpO2: 96% (96% - 98%)  Wt(kg): --   WEIGHT :    [id number]                  BMI:  I&O's Summary    I & Os for current day (as of 13 Apr 2017 12:04)  =============================================  IN: 120 ml / OUT: 0 ml / NET: 120 ml    CAPILLARY BLOOD GLUCOSE      GENERAL: alert [ ] oriented x ______[ x] lethargic        [ ] cachexia     [x ] nonverbal [ ] coma [ ]    HEENT: normal [ ] dry mouth [ x] ET tube/trach [ ]   LUNGS: ]  CV :  normal [x  GI : normal [ x]  PEG /NG tube [ ]   : normal [ ] incontinent [ ] oliguria/anuria [ ] spence [xx ]  MSK : normal [ ] weakness [ ] edema [ ]              ambulatory [ ] bebbound/wheelchair bound [ ]   SKIN : normal [x pressure ulcer (Y/N) Stage ______________ rash (Y/N)    Functional Assessment:Karnofsky Performance Score  low  Palliative Performance Status Version 2:  poor    LABS:                        12.6   7.0   )-----------( 287      ( 13 Apr 2017 09:20 )             36.4     04-12    145  |  111<H>  |  22  ----------------------------<  92  3.5   |  21<L>  |  0.92    Ca    8.4<L>      12 Apr 2017 06:47          I&O's Detail    I & Os for current day (as of 13 Apr 2017 12:04)  =============================================  IN:    Oral Fluid: 120 ml    Total IN: 120 ml  ---------------------------------------------  OUT:    Total OUT: 0 ml  ---------------------------------------------  Total NET: 120 ml      Assessment:   89y Male admitted with NON-ST-ELEVATION MYOCARDIAL INFARCTION, AGITATION  FELL LACERATION TO BACK OF HEAD      PROBLEM LIST :  PROBLEM/RECOMMENDATION: 1  Problem : Goals of care, counseling/ discussion.  Recommendation: met with patient/ family and discussed GOC & Advanced care planning                                    Palliative care info/counseling provided (Y/N)                                      Family meeting                                   Advanced Directives addressed (Y/N)  PROBLEM/ RECOMMENDATION:2  Problem :Resuscitation/ DNR/ DNI,   Recommendation: DNR/ DNI    PROBLEM/ RECOMMENDATION :3  Problem : Medical order for life sustaning treatment  Recommendation : MOLST Form ( initiated/ completed)    PROBLEM/RECOMMENDATION :4  Problem : Advanced care planning  Recommendation : Family meeting.(Y/N)     PLAN:  REFFERALS:                           Unit SW/Case Mgmt (Y/N)                          (Y/N)                         Speech/Swallow (Y/N)                           nutrition                                              Ethics (Y/N)                         PT/OT (Y/N)

## 2017-04-13 NOTE — PROGRESS NOTE ADULT - ASSESSMENT
adv age, adl compromised, KPS low   PPS poor  prognosis poor   sx controlled  spoke w dr miller wants to cont all care for now

## 2017-04-14 DIAGNOSIS — E86.0 DEHYDRATION: ICD-10-CM

## 2017-04-14 LAB
CULTURE RESULTS: NO GROWTH — SIGNIFICANT CHANGE UP
SPECIMEN SOURCE: SIGNIFICANT CHANGE UP

## 2017-04-14 PROCEDURE — 99232 SBSQ HOSP IP/OBS MODERATE 35: CPT

## 2017-04-14 RX ORDER — SODIUM CHLORIDE 9 MG/ML
1000 INJECTION INTRAMUSCULAR; INTRAVENOUS; SUBCUTANEOUS
Qty: 0 | Refills: 0 | Status: DISCONTINUED | OUTPATIENT
Start: 2017-04-14 | End: 2017-04-17

## 2017-04-14 RX ORDER — SODIUM CHLORIDE 9 MG/ML
300 INJECTION INTRAMUSCULAR; INTRAVENOUS; SUBCUTANEOUS ONCE
Qty: 0 | Refills: 0 | Status: COMPLETED | OUTPATIENT
Start: 2017-04-14 | End: 2017-04-14

## 2017-04-14 RX ADMIN — OLANZAPINE 5 MILLIGRAM(S): 15 TABLET, FILM COATED ORAL at 17:23

## 2017-04-14 RX ADMIN — Medication 81 MILLIGRAM(S): at 14:12

## 2017-04-14 RX ADMIN — HALOPERIDOL DECANOATE 5 MILLIGRAM(S): 100 INJECTION INTRAMUSCULAR at 05:00

## 2017-04-14 RX ADMIN — HEPARIN SODIUM 5000 UNIT(S): 5000 INJECTION INTRAVENOUS; SUBCUTANEOUS at 17:23

## 2017-04-14 RX ADMIN — HEPARIN SODIUM 5000 UNIT(S): 5000 INJECTION INTRAVENOUS; SUBCUTANEOUS at 05:17

## 2017-04-14 RX ADMIN — HALOPERIDOL DECANOATE 5 MILLIGRAM(S): 100 INJECTION INTRAMUSCULAR at 17:23

## 2017-04-14 RX ADMIN — Medication 25 MILLIGRAM(S): at 05:17

## 2017-04-14 RX ADMIN — SODIUM CHLORIDE 150 MILLILITER(S): 9 INJECTION INTRAMUSCULAR; INTRAVENOUS; SUBCUTANEOUS at 15:33

## 2017-04-14 RX ADMIN — SODIUM CHLORIDE 50 MILLILITER(S): 9 INJECTION INTRAMUSCULAR; INTRAVENOUS; SUBCUTANEOUS at 18:27

## 2017-04-14 RX ADMIN — OLANZAPINE 5 MILLIGRAM(S): 15 TABLET, FILM COATED ORAL at 05:17

## 2017-04-14 NOTE — PROGRESS NOTE ADULT - PROBLEM SELECTOR PLAN 4
likely R/T agitation  c/w low dose beta blocker likely R/T agitation  c/w low dose beta blocker  could add another antihypertensive med if needed for better control of BP, echo pending

## 2017-04-14 NOTE — PROGRESS NOTE ADULT - ASSESSMENT
89 y/o man with advanced dementia, agitation and unsteady gait, s/p probable mechanical fall. Has + troponin,

## 2017-04-14 NOTE — PROGRESS NOTE ADULT - PROBLEM SELECTOR PLAN 4
Resolved. On 4/13, 450 ml urine on bladder scan.   Rodgers removed today, bladder scan 40 ml this afternoon, 60 ml after 300 ml bolus.

## 2017-04-14 NOTE — PROGRESS NOTE ADULT - PROBLEM SELECTOR PLAN 2
c/w ASA   hemodynamically stable and would continue to treat the patient conservatively.  Echo result pending

## 2017-04-14 NOTE — PROGRESS NOTE ADULT - SUBJECTIVE AND OBJECTIVE BOX
Patient is a 89y old  Male who presents with a chief complaint of mental status change. (11 Apr 2017 10:52)      PAST MEDICAL & SURGICAL HISTORY:  Dementia  No significant past surgical history      INTERVAL HISTORY: Resting in bed, not in any acute distress  	  MEDICATIONS:  MEDICATIONS  (STANDING):  aspirin enteric coated 81milliGRAM(s) Oral daily  heparin  Injectable 5000Unit(s) SubCutaneous every 12 hours  OLANZapine 5milliGRAM(s) Oral every 12 hours  sodium chloride 0.9%. 1000milliLiter(s) IV Continuous <Continuous>  metoprolol 25milliGRAM(s) Oral two times a day    MEDICATIONS  (PRN):  haloperidol    Injectable 5milliGRAM(s) IntraMuscular every 6 hours PRN Agitation  acetaminophen   Tablet 650milliGRAM(s) Oral every 6 hours PRN For Temp greater than 38 C (100.4 F)      Vitals:  T(F): 98.1, Max: 98.1 (04-14 @ 12:32)  HR: 60 (60 - 75)  BP: 163/71 (115/64 - 195/104)  RR: 16 (14 - 16)  SpO2: 96% (94% - 97%)  I & Os for 24h ending 04-14 @ 07:00  =============================================  IN:    Oral Fluid: 120 ml    Total IN: 120 ml  ---------------------------------------------  OUT:    Total OUT: 0 ml  ---------------------------------------------  Total NET: 120 ml    I & Os for current day (as of 04-14 @ 13:40)  =============================================  IN:    Oral Fluid: 60 ml    Total IN: 60 ml  ---------------------------------------------  OUT:    Total OUT: 0 ml  ---------------------------------------------  Total NET: 60 ml    PHYSICAL EXAM:  Neuro: Awake, responsive  CV: S1 S2 RRR  Lungs: CTABL  GI: Soft, BS +, ND, NT  Extremities: No edema     RADIOLOGY: CHEST SINGLE VIEW              DIAGNOSTIC TESTING:  Pulmonary vascular congestion.  Increasing right lower and left upper lung opacities.    [x ] Echocardiogram:    LABS:	 	    CARDIAC MARKERS:  Troponin I, Serum: 4.670 ng/mL (04-12 @ 06:47)  Troponin I, Serum: 4.490 ng/mL (04-11 @ 23:43)  Troponin I, Serum: 3.170 ng/mL (04-11 @ 14:09)      12 Apr 2017 06:47    145    |  111    |  22     ----------------------------<  92     3.5     |  21     |  0.92                          12.6   7.0   )-----------( 287      ( 13 Apr 2017 09:20 )             36.4 ,                       11.4   6.3   )-----------( 246      ( 12 Apr 2017 06:47 )             31.6     TSH: Thyroid Stimulating Hormone, Serum: 0.655 uIU/mL (04-11 @ 14:09) Patient is a 89y old  Male who presents with a chief complaint of mental status change. (11 Apr 2017 10:52)      PAST MEDICAL & SURGICAL HISTORY:  Dementia  No significant past surgical history      INTERVAL HISTORY: Resting in bed, not in any acute distress, calm at present, on enhanced observation, denies any chest pain or sob.  	  MEDICATIONS:  MEDICATIONS  (STANDING):  aspirin enteric coated 81milliGRAM(s) Oral daily  heparin  Injectable 5000Unit(s) SubCutaneous every 12 hours  OLANZapine 5milliGRAM(s) Oral every 12 hours  sodium chloride 0.9%. 1000milliLiter(s) IV Continuous <Continuous>  metoprolol 25milliGRAM(s) Oral two times a day    MEDICATIONS  (PRN):  haloperidol    Injectable 5milliGRAM(s) IntraMuscular every 6 hours PRN Agitation  acetaminophen   Tablet 650milliGRAM(s) Oral every 6 hours PRN For Temp greater than 38 C (100.4 F)      Vitals:  T(F): 98.1, Max: 98.1 (04-14 @ 12:32)  HR: 60 (60 - 75)  BP: 163/71 (115/64 - 195/104)  RR: 16 (14 - 16)  SpO2: 96% (94% - 97%)  I & Os for 24h ending 04-14 @ 07:00  =============================================  IN:    Oral Fluid: 120 ml    Total IN: 120 ml  ---------------------------------------------  OUT:    Total OUT: 0 ml  ---------------------------------------------  Total NET: 120 ml    I & Os for current day (as of 04-14 @ 13:40)  =============================================  IN:    Oral Fluid: 60 ml    Total IN: 60 ml  ---------------------------------------------  OUT:    Total OUT: 0 ml  ---------------------------------------------  Total NET: 60 ml    PHYSICAL EXAM:  Neuro: Awake, responsive, calm  CV: S1 S2 RRR  Lungs: diminished to bases  GI: Soft, BS +, ND, NT  Extremities: No edema     RADIOLOGY: CHEST SINGLE VIEW              DIAGNOSTIC TESTING:  Pulmonary vascular congestion.  Increasing right lower and left upper lung opacities.    [x ] Echocardiogram:    LABS:	 	    CARDIAC MARKERS:  Troponin I, Serum: 4.670 ng/mL (04-12 @ 06:47)  Troponin I, Serum: 4.490 ng/mL (04-11 @ 23:43)  Troponin I, Serum: 3.170 ng/mL (04-11 @ 14:09)      12 Apr 2017 06:47    145    |  111    |  22     ----------------------------<  92     3.5     |  21     |  0.92                          12.6   7.0   )-----------( 287      ( 13 Apr 2017 09:20 )             36.4 ,                       11.4   6.3   )-----------( 246      ( 12 Apr 2017 06:47 )             31.6     TSH: Thyroid Stimulating Hormone, Serum: 0.655 uIU/mL (04-11 @ 14:09)

## 2017-04-14 NOTE — PROGRESS NOTE ADULT - ASSESSMENT
89 y/o man with advanced dementia, agitation and unsteady gait, s/p probable mechanical fall. Has + troponin, but no acute ST T changes.

## 2017-04-14 NOTE — PROGRESS NOTE ADULT - SUBJECTIVE AND OBJECTIVE BOX
Patient is a 89y old  Male who presents with a chief complaint of mental status change. (2017 10:52)    HPI:  89 y/o Man withf dementia, b/l hip surgery brought in for worsening agitation accompanied by son. Son states for past 2-3 days his dementia/confusion has gotten worse to the point he is not making sense and unable to ambulate well. The son heard a thump upstairs and found pt on the floor attempting to get up; he had a mild head contusion, but no active bleeding. No history of seizures.  History is limited due to dementia, pt required sedation for agitation in ED. (2017 05:15)       INTERVAL HPI/OVERNIGHT EVENTS:        Allergies    No Known Allergies    Intolerances        ROS: pt was combative today    Vital Signs Last 24 Hrs  T(C): 37.2, Max: 37.2 ( @ 17:25)  T(F): 99, Max: 99 ( @ 17:25)  HR: 78 (60 - 78)  BP: 115/71 (115/64 - 186/91)  BP(mean): --  RR: 16 (14 - 16)  SpO2: 98% (94% - 98%)  CAPILLARY BLOOD GLUCOSE    I & Os for 24h ending  @ 07:00  =============================================  IN: 120 ml / OUT: 0 ml / NET: 120 ml    I & Os for current day (as of  @ 18:59)  =============================================  IN: 650 ml / OUT: 0 ml / NET: 650 ml        GENERAL: NAD,  HEAD: atraumatic, no lacerations or lesions  EYES: EOMI, conjunctiva and sclera clear  ENMT: Moist mucous membranes,   NECK: Supple, No JVD, Normal thyroid  NERVOUS SYSTEM:  confused, moves all extremities  CHEST/LUNG: Clear  bilaterally; No rales, rhonchi, wheezing, or rubs  HEART: Regular rate and rhythm; No murmurs, rubs, or gallops  ABDOMEN: Soft, Nontender, Nondistended; Bowel sounds present  EXTREMITIES:   No clubbing, cyanosis, or edema                LABS:                        12.6   7.0   )-----------( 287      ( 2017 09:20 )             36.4             Urinalysis Basic - ( 2017 11:52 )    Color: Yellow / Appearance: Clear / S.010 / pH: x  Gluc: x / Ketone: Moderate  / Bili: Negative / Urobili: Negative mg/dL   Blood: x / Protein: 100 mg/dL / Nitrite: Negative   Leuk Esterase: Negative / RBC: 0-2 /HPF / WBC 0-2   Sq Epi: x / Non Sq Epi: x / Bacteria: x                    RADIOLOGY & ADDITIONAL TESTS:        MEDICATIONS  (STANDING):  aspirin enteric coated 81milliGRAM(s) Oral daily  heparin  Injectable 5000Unit(s) SubCutaneous every 12 hours  OLANZapine 5milliGRAM(s) Oral every 12 hours  sodium chloride 0.9%. 1000milliLiter(s) IV Continuous <Continuous>  metoprolol 25milliGRAM(s) Oral two times a day  sodium chloride 0.9%. 1000milliLiter(s) IV Continuous <Continuous>    MEDICATIONS  (PRN):  haloperidol    Injectable 5milliGRAM(s) IntraMuscular every 6 hours PRN Agitation  acetaminophen   Tablet 650milliGRAM(s) Oral every 6 hours PRN For Temp greater than 38 C (100.4 F)        ASSESSMENT AND PLAN:  MIGUEL RANGELyMale  NON-ST-ELEVATION MYOCARDIAL INFARCTION, AGITATION: NON-ST-ELEVATION MYOCARDIAL INFARCTION, AGITATION  FELL LACERATION TO BACK OF HEAD: FELL LACERATION TO BACK OF HEAD  Dementia  NSTEMI (non-ST elevated myocardial infarction)  Acute urinary retention: Acute urinary retention  HTN (hypertension): HTN (hypertension)  Laceration of scalp, subsequent encounter: Laceration of scalp, subsequent encounter  Fall at home, initial encounter: Fall at home, initial encounter  NSTEMI (non-ST elevated myocardial infarction): NSTEMI (non-ST elevated myocardial infarction)  Dementia with behavioral disturbance, unspecified dementia type: Dementia with behavioral disturbance, unspecified dementia type  Agitation  Dementia        acetaminophen   Tablet  Complete Blood Count + Automated Diff  Xray Chest 1 View AP/PA.  sodium chloride 0.9%.  diphenhydrAMINE   Capsule [completed]  hydrALAZINE Injectable [completed]  Culture - Urine  Urinalysis  Urine Microscopic-Add On (NC)  metoprolol  sodium chloride 0.9% Bolus [completed]  sodium chloride 0.9%.

## 2017-04-15 PROCEDURE — 99232 SBSQ HOSP IP/OBS MODERATE 35: CPT

## 2017-04-15 RX ADMIN — HEPARIN SODIUM 5000 UNIT(S): 5000 INJECTION INTRAVENOUS; SUBCUTANEOUS at 18:39

## 2017-04-15 RX ADMIN — HEPARIN SODIUM 5000 UNIT(S): 5000 INJECTION INTRAVENOUS; SUBCUTANEOUS at 05:44

## 2017-04-15 RX ADMIN — SODIUM CHLORIDE 50 MILLILITER(S): 9 INJECTION INTRAMUSCULAR; INTRAVENOUS; SUBCUTANEOUS at 21:59

## 2017-04-15 RX ADMIN — OLANZAPINE 5 MILLIGRAM(S): 15 TABLET, FILM COATED ORAL at 05:44

## 2017-04-15 RX ADMIN — OLANZAPINE 5 MILLIGRAM(S): 15 TABLET, FILM COATED ORAL at 18:39

## 2017-04-15 RX ADMIN — Medication 25 MILLIGRAM(S): at 05:44

## 2017-04-15 RX ADMIN — Medication 81 MILLIGRAM(S): at 12:07

## 2017-04-15 NOTE — PROGRESS NOTE ADULT - SUBJECTIVE AND OBJECTIVE BOX
CHIEF COMPLAINT/INTERVAL HISTORY:    Patient is a 89y old  Male who presents with a chief complaint of mental status change. (11 Apr 2017 10:52)      HPI:  89 y/o Man withf dementia, b/l hip surgery brought in for worsening agitation accompanied by son. Son states for past 2-3 days his dementia/confusion has gotten worse to the point he is not making sense and unable to ambulate well. The son heard a thump upstairs and found pt on the floor attempting to get up; he had a mild head contusion, but no active bleeding. No history of seizures.  History is limited due to dementia, pt required sedation for agitation in ED. (11 Apr 2017 05:15)    Overnight issues        SUBJECTIVE & OBJECTIVE: Pt seen and examined at bedside.   ROS:  CONSTITUTIONAL: No fever, weight loss, or fatigue  EYES: No eye pain, visual disturbances, or discharge  ENMT:  No difficulty hearing, tinnitus, vertigo; No sinus or throat pain  NECK: No pain or stiffness  RESPIRATORY: No cough, wheezing, chills or hemoptysis; No shortness of breath  CARDIOVASCULAR: No chest pain, palpitations, dizziness, or leg swelling  GASTROINTESTINAL: No abdominal or epigastric pain. No nausea, vomiting, or hematemesis; No diarrhea or constipation. No melena or hematochezia.  GENITOURINARY: No dysuria, frequency, hematuria, or incontinence  NEUROLOGICAL: No headaches, memory loss, loss of strength, numbness, or tremors  SKIN: No itching, burning, rashes, or lesions   LYMPH NODES: No enlarged glands  ENDOCRINE: No heat or cold intolerance; No hair loss  MUSCULOSKELETAL: No joint pain or swelling; No muscle, back, or extremity pain  PSYCHIATRIC: dementia, pt required sedation for agitation in ED.   continued confusion due to dementia    HEME/LYMPH: No easy bruising, or bleeding gums  ALLERGY AND IMMUNOLOGIC: No hives or eczema      ICU Vital Signs Last 24 Hrs  T(C): 36.6, Max: 37.2 (04-14 @ 17:25)  T(F): 97.8, Max: 99 (04-14 @ 17:25)  HR: 76 (76 - 101)  BP: 162/95 (115/71 - 162/95)  BP(mean): --  ABP: --  ABP(mean): --  RR: 17 (16 - 17)  SpO2: 92% (92% - 98%)        MEDICATIONS  (STANDING):  aspirin enteric coated 81milliGRAM(s) Oral daily  heparin  Injectable 5000Unit(s) SubCutaneous every 12 hours  OLANZapine 5milliGRAM(s) Oral every 12 hours  metoprolol 25milliGRAM(s) Oral two times a day  sodium chloride 0.9%. 1000milliLiter(s) IV Continuous <Continuous>    MEDICATIONS  (PRN):  haloperidol    Injectable 5milliGRAM(s) IntraMuscular every 6 hours PRN Agitation  acetaminophen   Tablet 650milliGRAM(s) Oral every 6 hours PRN For Temp greater than 38 C (100.4 F)        PHYSICAL EXAM:    GENERAL: NAD, well-groomed, well-developed  HEAD:  Atraumatic, Normocephalic  EYES: EOMI, PERRLA, conjunctiva and sclera clear  ENMT: Moist mucous membranes  NECK: Supple, No JVD  NERVOUS SYSTEM:  Alert & Oriented X3, Motor Strength 5/5 B/L upper and lower extremities; DTRs 2+ intact and symmetric  CHEST/LUNG: Clear to auscultation bilaterally; No rales, rhonchi, wheezing, or rubs  HEART: Regular rate and rhythm; No murmurs, rubs, or gallops  ABDOMEN: Soft, Nontender, Nondistended; Bowel sounds present  EXTREMITIES:  2+ Peripheral Pulses, No clubbing, cyanosis, or edema    LABS:  none today      RECENT CULTURES: urine neg      RADIOLOGY & ADDITIONAL TESTS:  Imaging Personally Reviewed:  [ ] YES      Consultant(s) Notes Reviewed:  [X ] YES     Care Discussed with [ ] Consultants [ ] Patient [ ] Family  [ ]    [ ]  Other; RN  HEALTH ISSUES - PROBLEM Dx:  Mild dehydration: Mild dehydration  Acute urinary retention: Acute urinary retention  HTN (hypertension): HTN (hypertension)  Laceration of scalp, subsequent encounter: Laceration of scalp, subsequent encounter  Fall at home, initial encounter: Fall at home, initial encounter  NSTEMI (non-ST elevated myocardial infarction): NSTEMI (non-ST elevated myocardial infarction)  Dementia with behavioral disturbance, unspecified dementia type: Dementia with behavioral disturbance, unspecified dementia type        DVT/GI ppx heparin     Discussed with pt @ bedside    Code Status CHIEF COMPLAINT/INTERVAL HISTORY:    Patient is a 89y old  Male who presents with a chief complaint of mental status change. (11 Apr 2017 10:52)      HPI:  89 y/o Man withf dementia, b/l hip surgery brought in for worsening agitation accompanied by son. Son states for past 2-3 days his dementia/confusion has gotten worse to the point he is not making sense and unable to ambulate well. The son heard a thump upstairs and found pt on the floor attempting to get up; he had a mild head contusion, but no active bleeding. No history of seizures.  History is limited due to dementia, pt required sedation for agitation in ED. (11 Apr 2017 05:15)    Overnight issues        SUBJECTIVE & OBJECTIVE: Pt seen and examined at bedside.   ROS:  CONSTITUTIONAL: No fever, weight loss, or fatigue  EYES: No eye pain, visual disturbances, or discharge  ENMT:  No difficulty hearing, tinnitus, vertigo; No sinus or throat pain  NECK: No pain or stiffness  RESPIRATORY: No cough, wheezing, chills or hemoptysis; No shortness of breath  CARDIOVASCULAR: No chest pain, palpitations, dizziness, or leg swelling  GASTROINTESTINAL: No abdominal or epigastric pain. No nausea, vomiting, or hematemesis; No diarrhea or constipation. No melena or hematochezia.  GENITOURINARY: No dysuria, frequency, hematuria, or incontinence  NEUROLOGICAL: No headaches, memory loss, loss of strength, numbness, or tremors  SKIN: No itching, burning, rashes, or lesions   LYMPH NODES: No enlarged glands  ENDOCRINE: No heat or cold intolerance; No hair loss  MUSCULOSKELETAL: No joint pain or swelling; No muscle, back, or extremity pain  PSYCHIATRIC: dementia, pt required sedation for agitation in ED.   continued confusion due to dementia    HEME/LYMPH: No easy bruising, or bleeding gums  ALLERGY AND IMMUNOLOGIC: No hives or eczema      ICU Vital Signs Last 24 Hrs  T(F): 97.8, Max: 99 (04-14 @ 17:25)    HR: 76 (76 - 101)    BP: 162/95 (115/71 - 162/95)    RR: 17 (16 - 17)  SpO2: 92%         MEDICATIONS  (STANDING):  aspirin enteric coated 81milliGRAM(s) Oral daily  heparin  Injectable 5000Unit(s) SubCutaneous every 12 hours  OLANZapine 5milliGRAM(s) Oral every 12 hours  metoprolol 25milliGRAM(s) Oral two times a day  sodium chloride 0.9%. 1000milliLiter(s) IV Continuous <Continuous>    MEDICATIONS  (PRN):  haloperidol    Injectable 5milliGRAM(s) IntraMuscular every 6 hours PRN Agitation  acetaminophen   Tablet 650milliGRAM(s) Oral every 6 hours PRN For Temp greater than 38 C (100.4 F)        PHYSICAL EXAM:    GENERAL: NAD, well-groomed, well-developed  HEAD:  Atraumatic, Normocephalic  EYES: EOMI, PERRLA, conjunctiva and sclera clear  ENMT: Moist mucous membranes  NECK: Supple, No JVD  NERVOUS SYSTEM:  Alert & Oriented X3, Motor Strength 5/5 B/L upper and lower extremities; DTRs 2+ intact and symmetric  CHEST/LUNG: Clear to auscultation bilaterally; No rales, rhonchi, wheezing, or rubs  HEART: Regular rate and rhythm; No murmurs, rubs, or gallops  ABDOMEN: Soft, Nontender, Nondistended; Bowel sounds present  EXTREMITIES:  2+ Peripheral Pulses, No clubbing, cyanosis, or edema    LABS:  none today      RECENT CULTURES: urine neg    Consultant(s) Notes Reviewed:  [X ] YES     Care Discussed with [X ] Consultants [X ] Patient [ ] Family  [ ]    [X ]  Other; RN  HEALTH ISSUES - PROBLEM Dx:  Mild dehydration: Mild dehydration  Acute urinary retention: Acute urinary retention  HTN (hypertension): HTN (hypertension)  Laceration of scalp, subsequent encounter: Laceration of scalp, subsequent encounter  Fall at home, initial encounter: Fall at home, initial encounter  NSTEMI (non-ST elevated myocardial infarction): NSTEMI (non-ST elevated myocardial infarction)  Dementia with behavioral disturbance, unspecified dementia type: Dementia with behavioral disturbance, unspecified dementia type        DVT/GI ppx heparin         Code Status

## 2017-04-15 NOTE — PROGRESS NOTE ADULT - PROBLEM SELECTOR PLAN 3
no acute fracture; CT head no bleed or fracture, will require PT  subacute rehab no acute fracture; CT head no bleed or fracture, will require PT  for subacute rehab

## 2017-04-15 NOTE — PROGRESS NOTE ADULT - SUBJECTIVE AND OBJECTIVE BOX
CARDIOLOGY COVERAGE FOR [facility] & [facility]  NO COMPLAINTS;NO JVD;CLEAR LUNGS; REGULAR RATE & RHYTHM, NORMAL S1& S2, NO SIGNIFICANT MURMURS; NON TENDER ABDOMEN; NO EDEMA  STABLE CARDIOVASCULAR STATUS; CONTINUING WITH PRESENT MANAGEMENT.

## 2017-04-16 DIAGNOSIS — E87.6 HYPOKALEMIA: ICD-10-CM

## 2017-04-16 LAB
ANION GAP SERPL CALC-SCNC: 9 MMOL/L — SIGNIFICANT CHANGE UP (ref 5–17)
BUN SERPL-MCNC: 37 MG/DL — HIGH (ref 7–23)
CALCIUM SERPL-MCNC: 8.2 MG/DL — LOW (ref 8.5–10.1)
CHLORIDE SERPL-SCNC: 110 MMOL/L — HIGH (ref 96–108)
CO2 SERPL-SCNC: 26 MMOL/L — SIGNIFICANT CHANGE UP (ref 22–31)
CREAT SERPL-MCNC: 1.04 MG/DL — SIGNIFICANT CHANGE UP (ref 0.5–1.3)
GLUCOSE SERPL-MCNC: 117 MG/DL — HIGH (ref 70–99)
POTASSIUM SERPL-MCNC: 3 MMOL/L — LOW (ref 3.5–5.3)
POTASSIUM SERPL-SCNC: 3 MMOL/L — LOW (ref 3.5–5.3)
SODIUM SERPL-SCNC: 145 MMOL/L — SIGNIFICANT CHANGE UP (ref 135–145)

## 2017-04-16 PROCEDURE — 99233 SBSQ HOSP IP/OBS HIGH 50: CPT

## 2017-04-16 RX ORDER — POTASSIUM CHLORIDE 20 MEQ
40 PACKET (EA) ORAL EVERY 4 HOURS
Qty: 0 | Refills: 0 | Status: COMPLETED | OUTPATIENT
Start: 2017-04-16 | End: 2017-04-16

## 2017-04-16 RX ADMIN — Medication 25 MILLIGRAM(S): at 18:22

## 2017-04-16 RX ADMIN — Medication 650 MILLIGRAM(S): at 00:31

## 2017-04-16 RX ADMIN — OLANZAPINE 5 MILLIGRAM(S): 15 TABLET, FILM COATED ORAL at 05:39

## 2017-04-16 RX ADMIN — Medication 25 MILLIGRAM(S): at 05:38

## 2017-04-16 RX ADMIN — Medication 81 MILLIGRAM(S): at 12:27

## 2017-04-16 RX ADMIN — Medication 40 MILLIEQUIVALENT(S): at 12:27

## 2017-04-16 RX ADMIN — Medication 40 MILLIEQUIVALENT(S): at 18:22

## 2017-04-16 RX ADMIN — HEPARIN SODIUM 5000 UNIT(S): 5000 INJECTION INTRAVENOUS; SUBCUTANEOUS at 18:21

## 2017-04-16 RX ADMIN — HALOPERIDOL DECANOATE 5 MILLIGRAM(S): 100 INJECTION INTRAMUSCULAR at 05:38

## 2017-04-16 RX ADMIN — SODIUM CHLORIDE 50 MILLILITER(S): 9 INJECTION INTRAMUSCULAR; INTRAVENOUS; SUBCUTANEOUS at 15:11

## 2017-04-16 RX ADMIN — OLANZAPINE 5 MILLIGRAM(S): 15 TABLET, FILM COATED ORAL at 18:22

## 2017-04-16 RX ADMIN — Medication 40 MILLIEQUIVALENT(S): at 15:10

## 2017-04-16 RX ADMIN — HEPARIN SODIUM 5000 UNIT(S): 5000 INJECTION INTRAVENOUS; SUBCUTANEOUS at 05:38

## 2017-04-16 NOTE — PROGRESS NOTE ADULT - ASSESSMENT
89-year-old male admitted with agitation with advanced dementia, status post fall. Elevated troponins likely due to non-ST elevation MI either prior or after the fall. Patient appears more confused than baseline but does not appear to be in any significant distress. Given his age, dementia and overall debility there is likely little point in pursuing any diagnostic testing or revascularization procedures. Patient appears hemodynamically stable and would continue to treat the patient conservatively.

## 2017-04-16 NOTE — PROGRESS NOTE ADULT - SUBJECTIVE AND OBJECTIVE BOX
Patient is a 89y old  Male who presents with a chief complaint of mental status change. (11 Apr 2017 10:52)      OVERNIGHT EVENTS: none    REVIEW OF SYSTEMS: Poor historian, no chest pain/SOB, no diaphoresis, no F/C, no cough, no headache, no vomiting    MEDICATIONS  (STANDING):  aspirin enteric coated 81milliGRAM(s) Oral daily  heparin  Injectable 5000Unit(s) SubCutaneous every 12 hours  OLANZapine 5milliGRAM(s) Oral every 12 hours  metoprolol 25milliGRAM(s) Oral two times a day  sodium chloride 0.9%. 1000milliLiter(s) IV Continuous <Continuous>    MEDICATIONS  (PRN):  haloperidol    Injectable 5milliGRAM(s) IntraMuscular every 6 hours PRN Agitation  acetaminophen   Tablet 650milliGRAM(s) Oral every 6 hours PRN For Temp greater than 38 C (100.4 F)      Allergies    No Known Allergies    Intolerances        T(F): 97.5, Max: 100.5 (04-15 @ 23:30)  HR: 50 (50 - 85)  BP: 141/80 (105/49 - 165/77)  RR: 16 (16 - 18)  SpO2: 92% (91% - 94%)  Wt(kg): --    PHYSICAL EXAM:  GENERAL: NAD, well-groomed, well-developed, elderly male  HEAD:  Atraumatic, Normocephalic  EYES: EOMI, PERRLA, conjunctiva and sclera clear  ENMT: No tonsillar erythema, exudates, or enlargement; Moist mucous membranes, Good dentition, No lesions  NECK: Supple, No JVD, Normal thyroid  NERVOUS SYSTEM:  awake, Alert, Good concentration; Motor Strength 5/5 B/L upper and lower extremities; DTRs 2+ intact and symmetric  CHEST/LUNG: Clear to percussion bilaterally; No rales, rhonchi, wheezing, or rubs BL  HEART: Regular rate and rhythm; No murmurs, rubs, or gallops  ABDOMEN: Soft, Nontender, Nondistended; Bowel sounds present  EXTREMITIES:  2+ Peripheral Pulses, No clubbing, cyanosis, or edema BL LE  LYMPH: No lymphadenopathy noted  SKIN: No rashes or lesions    LABS:    04-16    145  |  110<H>  |  37<H>  ----------------------------<  117<H>  3.0<L>   |  26  |  1.04    Ca    8.2<L>      16 Apr 2017 07:15          Cultures;   CAPILLARY BLOOD GLUCOSE    Lipid panel:       RADIOLOGY & ADDITIONAL TESTS:    Imaging Personally Reviewed:  [x ] YES    TTE-  1. Left ventricular ejection fraction, by visual estimation, is 50 to   55%.   2. Mildly increased LV wall thickness.   3. Spectral Doppler shows pseudonormal pattern of left ventricular   myocardial filling (Grade II diastolic dysfunction).   4. There is mild concentric left ventricular hypertrophy.   5. Normal right ventricular size and function.   6. The left atrium is normal in size.   7. The right atrium is normal in size.   8. Thickening of the anterior mitral valve leaflet.   9. Structurally normal tricuspid valve, with normal leaflet excursion.  10. Mild aortic regurgitation.  11. Sclerotic aortic valve with normal opening.  12. Structurally normal pulmonic valve, with normal leaflet excursion.      Consultant(s) Notes Reviewed:  [x ] YES     Care Discussed with [x ] Consultants [X ] Patient [ ] Family  [x ]    [x ]  Other; RN

## 2017-04-17 DIAGNOSIS — E44.1 MILD PROTEIN-CALORIE MALNUTRITION: ICD-10-CM

## 2017-04-17 LAB
ANION GAP SERPL CALC-SCNC: 11 MMOL/L — SIGNIFICANT CHANGE UP (ref 5–17)
BUN SERPL-MCNC: 33 MG/DL — HIGH (ref 7–23)
CALCIUM SERPL-MCNC: 8.4 MG/DL — LOW (ref 8.5–10.1)
CHLORIDE SERPL-SCNC: 115 MMOL/L — HIGH (ref 96–108)
CHOLEST SERPL-MCNC: 168 MG/DL — SIGNIFICANT CHANGE UP (ref 10–199)
CO2 SERPL-SCNC: 21 MMOL/L — LOW (ref 22–31)
CREAT SERPL-MCNC: 0.92 MG/DL — SIGNIFICANT CHANGE UP (ref 0.5–1.3)
GLUCOSE SERPL-MCNC: 96 MG/DL — SIGNIFICANT CHANGE UP (ref 70–99)
HDLC SERPL-MCNC: 25 MG/DL — LOW (ref 40–125)
LIPID PNL WITH DIRECT LDL SERPL: 111 MG/DL — SIGNIFICANT CHANGE UP
MAGNESIUM SERPL-MCNC: 2.1 MG/DL — SIGNIFICANT CHANGE UP (ref 1.8–2.4)
POTASSIUM SERPL-MCNC: 4.2 MMOL/L — SIGNIFICANT CHANGE UP (ref 3.5–5.3)
POTASSIUM SERPL-SCNC: 4.2 MMOL/L — SIGNIFICANT CHANGE UP (ref 3.5–5.3)
SODIUM SERPL-SCNC: 147 MMOL/L — HIGH (ref 135–145)
TOTAL CHOLESTEROL/HDL RATIO MEASUREMENT: 6.7 RATIO — SIGNIFICANT CHANGE UP (ref 3.4–9.6)
TRIGL SERPL-MCNC: 160 MG/DL — HIGH (ref 10–149)

## 2017-04-17 PROCEDURE — 99231 SBSQ HOSP IP/OBS SF/LOW 25: CPT

## 2017-04-17 PROCEDURE — 99233 SBSQ HOSP IP/OBS HIGH 50: CPT

## 2017-04-17 RX ORDER — SODIUM CHLORIDE 9 MG/ML
1000 INJECTION, SOLUTION INTRAVENOUS
Qty: 0 | Refills: 0 | Status: DISCONTINUED | OUTPATIENT
Start: 2017-04-17 | End: 2017-04-20

## 2017-04-17 RX ADMIN — HEPARIN SODIUM 5000 UNIT(S): 5000 INJECTION INTRAVENOUS; SUBCUTANEOUS at 17:48

## 2017-04-17 RX ADMIN — SODIUM CHLORIDE 50 MILLILITER(S): 9 INJECTION, SOLUTION INTRAVENOUS at 13:00

## 2017-04-17 RX ADMIN — HALOPERIDOL DECANOATE 5 MILLIGRAM(S): 100 INJECTION INTRAMUSCULAR at 03:38

## 2017-04-17 RX ADMIN — HEPARIN SODIUM 5000 UNIT(S): 5000 INJECTION INTRAVENOUS; SUBCUTANEOUS at 05:29

## 2017-04-17 RX ADMIN — OLANZAPINE 5 MILLIGRAM(S): 15 TABLET, FILM COATED ORAL at 17:48

## 2017-04-17 RX ADMIN — Medication 25 MILLIGRAM(S): at 05:29

## 2017-04-17 RX ADMIN — Medication 25 MILLIGRAM(S): at 17:48

## 2017-04-17 RX ADMIN — OLANZAPINE 5 MILLIGRAM(S): 15 TABLET, FILM COATED ORAL at 05:29

## 2017-04-17 RX ADMIN — Medication 81 MILLIGRAM(S): at 12:53

## 2017-04-17 NOTE — PROGRESS NOTE ADULT - ASSESSMENT
89 y/o man with advanced dementia, agitation and unsteady gait, s/p probable mechanical fall. Has + troponin, Echo normal EF 89 y/o man with advanced dementia, agitation and unsteady gait, s/p probable mechanical fall. Has + troponin, Echo normal EF. Remains confused. Now with poor intake, palliative f/u, Calorie count.

## 2017-04-17 NOTE — PROGRESS NOTE ADULT - SUBJECTIVE AND OBJECTIVE BOX
CHIEF COMPLAINT/INTERVAL HISTORY:    Patient is a 89y old  Male who presents with a chief complaint of mental status change. (11 Apr 2017 10:52)      HPI:  89 y/o Man withf dementia, b/l hip surgery brought in for worsening agitation accompanied by son. Son states for past 2-3 days his dementia/confusion has gotten worse to the point he is not making sense and unable to ambulate well. The son heard a thump upstairs and found pt on the floor attempting to get up; he had a mild head contusion, but no active bleeding. No history of seizures.  History is limited due to dementia, pt required sedation for agitation in ED. (11 Apr 2017 05:15)      SUBJECTIVE & OBJECTIVE: Pt seen and examined at bedside.  Confused, alert/awake.  unable to get ROS.   Poor oral intake  as per RN    Vital Signs Last 24 Hrs  T(C): 37.1, Max: 37.1 (04-17 @ 12:15)  T(F): 98.7, Max: 98.7 (04-17 @ 12:15)  HR: 69 (69 - 81)  BP: 141/91 (126/79 - 156/83)  BP(mean): --  ABP: --  ABP(mean): --  RR: 16 (15 - 20)  SpO2: 93% (91% - 98%)        MEDICATIONS  (STANDING):  aspirin enteric coated 81milliGRAM(s) Oral daily  heparin  Injectable 5000Unit(s) SubCutaneous every 12 hours  OLANZapine 5milliGRAM(s) Oral every 12 hours  metoprolol 25milliGRAM(s) Oral two times a day  dextrose 5% + sodium chloride 0.45%. 1000milliLiter(s) IV Continuous <Continuous>    MEDICATIONS  (PRN):  haloperidol    Injectable 5milliGRAM(s) IntraMuscular every 6 hours PRN Agitation  acetaminophen   Tablet 650milliGRAM(s) Oral every 6 hours PRN For Temp greater than 38 C (100.4 F)        PHYSICAL EXAM:    GENERAL: NAD, alert/awake/confused.  HEAD:  Atraumatic, Normocephalic  EYES: EOMI, PERRLA, conjunctiva and sclera clear  ENMT: Moist mucous membranes  NECK: Supple, No JVD  NERVOUS SYSTEM:  confused, alert/awake, does not follow commands.  CHEST/LUNG: Clear to auscultation bilaterally; No rales, rhonchi, wheezing, or rubs  HEART: Regular rate and rhythm;  ABDOMEN: Soft, Nontender, Nondistended; Bowel sounds present  EXTREMITIES: no  cyanosis, or edema    LABS:    04-17    147<H>  |  115<H>  |  33<H>  ----------------------------<  96  4.2   |  21<L>  |  0.92    Ca    8.4<L>      17 Apr 2017 06:34  Mg     2.1     04-17

## 2017-04-17 NOTE — PROGRESS NOTE ADULT - PROBLEM SELECTOR PLAN 2
c/w ASA   hemodynamically stable and would continue to treat the patient conservatively.  Echo: normal EF, Grade II diastolic dysfunction,  Mild aortic regurgitation.

## 2017-04-17 NOTE — PROGRESS NOTE ADULT - SUBJECTIVE AND OBJECTIVE BOX
Patient is a 89y old  Male who presents with a chief complaint of mental status change. (11 Apr 2017 10:52)      PAST MEDICAL & SURGICAL HISTORY:  Dementia  No significant past surgical history      INTERVAL HISTORY: resting in bed. Not in any acute distress  	  MEDICATIONS:  MEDICATIONS  (STANDING):  aspirin enteric coated 81milliGRAM(s) Oral daily  heparin  Injectable 5000Unit(s) SubCutaneous every 12 hours  OLANZapine 5milliGRAM(s) Oral every 12 hours  metoprolol 25milliGRAM(s) Oral two times a day  dextrose 5% + sodium chloride 0.45%. 1000milliLiter(s) IV Continuous <Continuous>    MEDICATIONS  (PRN):  haloperidol    Injectable 5milliGRAM(s) IntraMuscular every 6 hours PRN Agitation  acetaminophen   Tablet 650milliGRAM(s) Oral every 6 hours PRN For Temp greater than 38 C (100.4 F)      Vitals:  T(F): 98.7, Max: 98.7 (04-17 @ 12:15)  HR: 69 (69 - 81)  BP: 141/91 (126/79 - 156/83)  RR: 16 (15 - 20)  SpO2: 93% (91% - 98%)    I & Os for current day (as of 04-17 @ 15:54)  =============================================  IN:    sodium chloride 0.9%: 1200 ml    Total IN: 1200 ml  ---------------------------------------------  OUT:    Total OUT: 0 ml  ---------------------------------------------  Total NET: 1200 ml    PHYSICAL EXAM:  Neuro: Awake, responsive  CV: S1 S2 RRR  Lungs: CTABL  GI: Soft, BS +, ND, NT  Extremities: No edema    RADIOLOGY: CHEST SINGLE VIEW          04/12/2017    Pulmonary vascular congestion.  Increasing right lower and left upper lung opacities.      DIAGNOSTIC TESTING:    [x ] Echocardiogram:04/14/2017     1. Left ventricular ejection fraction, by visual estimation, is 50 to   55%.   2. Mildly increased LV wall thickness.   3. Spectral Doppler shows pseudonormal pattern of left ventricular   myocardial filling (Grade II diastolic dysfunction).   4. There is mild concentric left ventricular hypertrophy.   5. Normal right ventricular size and function.   6. The left atrium is normal in size.   7. The right atrium is normal in size.   8. Thickening of the anterior mitral valve leaflet.   9. Structurally normal tricuspid valve, with normal leaflet excursion.  10. Mild aortic regurgitation.  11. Sclerotic aortic valve with normal opening.  12. Structurally normal pulmonic valve, with normal leaflet excursion.    LABS:	 	    17 Apr 2017 06:34    147    |  115    |  33     ----------------------------<  96     4.2     |  21     |  0.92   16 Apr 2017 07:15    145    |  110    |  37     ----------------------------<  117    3.0     |  26     |  1.04     Ca    8.4        17 Apr 2017 06:34  Mg     2.1       17 Apr 2017 06:34    Lipid Profile: 04-17 @ 13:56  HDL/Total Cholesterol: 6.7 RATIO  HDL Chol:              25 mg/dL  Serum Chol:            168 mg/dL  Direct LDL:            111 mg/dL  Triglycerides:         160 mg/dL Patient is a 89y old  Male who presents with a chief complaint of mental status change. (11 Apr 2017 10:52)      PAST MEDICAL & SURGICAL HISTORY:  Dementia  No significant past surgical history      INTERVAL HISTORY: resting in bed. Not in any acute distress, confused, wants to come out of bed. denies any sob or chest pain.  	  MEDICATIONS:  MEDICATIONS  (STANDING):  aspirin enteric coated 81milliGRAM(s) Oral daily  heparin  Injectable 5000Unit(s) SubCutaneous every 12 hours  OLANZapine 5milliGRAM(s) Oral every 12 hours  metoprolol 25milliGRAM(s) Oral two times a day  dextrose 5% + sodium chloride 0.45%. 1000milliLiter(s) IV Continuous <Continuous>    MEDICATIONS  (PRN):  haloperidol    Injectable 5milliGRAM(s) IntraMuscular every 6 hours PRN Agitation  acetaminophen   Tablet 650milliGRAM(s) Oral every 6 hours PRN For Temp greater than 38 C (100.4 F)      Vitals:  T(F): 98.7, Max: 98.7 (04-17 @ 12:15)  HR: 69 (69 - 81)  BP: 141/91 (126/79 - 156/83)  RR: 16 (15 - 20)  SpO2: 93% (91% - 98%)    I & Os for current day (as of 04-17 @ 15:54)  =============================================  IN:    sodium chloride 0.9%: 1200 ml    Total IN: 1200 ml  ---------------------------------------------  OUT:    Total OUT: 0 ml  ---------------------------------------------  Total NET: 1200 ml    PHYSICAL EXAM:  Neuro: Awake, responsive, anxious, confused  CV: S1 S2 RRR  Lungs: few rales to Rt base  GI: Soft, BS +, ND, NT  Extremities: No edema    RADIOLOGY: CHEST SINGLE VIEW          04/12/2017    Pulmonary vascular congestion.  Increasing right lower and left upper lung opacities.      DIAGNOSTIC TESTING:    [x ] Echocardiogram:04/14/2017     1. Left ventricular ejection fraction, by visual estimation, is 50 to   55%.   2. Mildly increased LV wall thickness.   3. Spectral Doppler shows pseudonormal pattern of left ventricular   myocardial filling (Grade II diastolic dysfunction).   4. There is mild concentric left ventricular hypertrophy.   5. Normal right ventricular size and function.   6. The left atrium is normal in size.   7. The right atrium is normal in size.   8. Thickening of the anterior mitral valve leaflet.   9. Structurally normal tricuspid valve, with normal leaflet excursion.  10. Mild aortic regurgitation.  11. Sclerotic aortic valve with normal opening.  12. Structurally normal pulmonic valve, with normal leaflet excursion.    LABS:	 	    17 Apr 2017 06:34    147    |  115    |  33     ----------------------------<  96     4.2     |  21     |  0.92   16 Apr 2017 07:15    145    |  110    |  37     ----------------------------<  117    3.0     |  26     |  1.04     Ca    8.4        17 Apr 2017 06:34  Mg     2.1       17 Apr 2017 06:34    Lipid Profile: 04-17 @ 13:56  HDL/Total Cholesterol: 6.7 RATIO  HDL Chol:              25 mg/dL  Serum Chol:            168 mg/dL  Direct LDL:            111 mg/dL  Triglycerides:         160 mg/dL

## 2017-04-17 NOTE — PROGRESS NOTE ADULT - ASSESSMENT
lethargic arousable open eyes  does not follows commands restless in bed  dementia  for sub acute  rehab .

## 2017-04-17 NOTE — PROGRESS NOTE ADULT - PROBLEM SELECTOR PLAN 4
c/w low dose beta blocker  could add another antihypertensive med if needed for better control of BP, echo pending

## 2017-04-18 LAB
ANION GAP SERPL CALC-SCNC: 14 MMOL/L — SIGNIFICANT CHANGE UP (ref 5–17)
BUN SERPL-MCNC: 27 MG/DL — HIGH (ref 7–23)
CALCIUM SERPL-MCNC: 9.1 MG/DL — SIGNIFICANT CHANGE UP (ref 8.5–10.1)
CHLORIDE SERPL-SCNC: 109 MMOL/L — HIGH (ref 96–108)
CO2 SERPL-SCNC: 21 MMOL/L — LOW (ref 22–31)
CREAT SERPL-MCNC: 0.97 MG/DL — SIGNIFICANT CHANGE UP (ref 0.5–1.3)
GLUCOSE SERPL-MCNC: 145 MG/DL — HIGH (ref 70–99)
POTASSIUM SERPL-MCNC: 3.7 MMOL/L — SIGNIFICANT CHANGE UP (ref 3.5–5.3)
POTASSIUM SERPL-SCNC: 3.7 MMOL/L — SIGNIFICANT CHANGE UP (ref 3.5–5.3)
PSA FLD-MCNC: 0.52 NG/ML — SIGNIFICANT CHANGE UP (ref 0–4)
SODIUM SERPL-SCNC: 144 MMOL/L — SIGNIFICANT CHANGE UP (ref 135–145)

## 2017-04-18 PROCEDURE — 99233 SBSQ HOSP IP/OBS HIGH 50: CPT

## 2017-04-18 PROCEDURE — 99231 SBSQ HOSP IP/OBS SF/LOW 25: CPT

## 2017-04-18 PROCEDURE — 76857 US EXAM PELVIC LIMITED: CPT | Mod: 26

## 2017-04-18 RX ORDER — TAMSULOSIN HYDROCHLORIDE 0.4 MG/1
0.4 CAPSULE ORAL AT BEDTIME
Qty: 0 | Refills: 0 | Status: DISCONTINUED | OUTPATIENT
Start: 2017-04-18 | End: 2017-04-20

## 2017-04-18 RX ADMIN — TAMSULOSIN HYDROCHLORIDE 0.4 MILLIGRAM(S): 0.4 CAPSULE ORAL at 22:18

## 2017-04-18 RX ADMIN — SODIUM CHLORIDE 50 MILLILITER(S): 9 INJECTION, SOLUTION INTRAVENOUS at 12:18

## 2017-04-18 RX ADMIN — HEPARIN SODIUM 5000 UNIT(S): 5000 INJECTION INTRAVENOUS; SUBCUTANEOUS at 05:18

## 2017-04-18 RX ADMIN — OLANZAPINE 5 MILLIGRAM(S): 15 TABLET, FILM COATED ORAL at 17:19

## 2017-04-18 RX ADMIN — Medication 81 MILLIGRAM(S): at 12:35

## 2017-04-18 RX ADMIN — Medication 25 MILLIGRAM(S): at 05:18

## 2017-04-18 RX ADMIN — OLANZAPINE 5 MILLIGRAM(S): 15 TABLET, FILM COATED ORAL at 05:18

## 2017-04-18 RX ADMIN — Medication 25 MILLIGRAM(S): at 17:19

## 2017-04-18 RX ADMIN — HEPARIN SODIUM 5000 UNIT(S): 5000 INJECTION INTRAVENOUS; SUBCUTANEOUS at 17:19

## 2017-04-18 NOTE — PROGRESS NOTE ADULT - SUBJECTIVE AND OBJECTIVE BOX
Patient is a 89y old  Male who presents with a chief complaint of mental status change. (11 Apr 2017 10:52)      PAST MEDICAL & SURGICAL HISTORY:  Dementia  No significant past surgical history      INTERVAL HISTORY: Resting in bed, not in any acute distress, remains confused  	  MEDICATIONS:  MEDICATIONS  (STANDING):  aspirin enteric coated 81milliGRAM(s) Oral daily  heparin  Injectable 5000Unit(s) SubCutaneous every 12 hours  OLANZapine 5milliGRAM(s) Oral every 12 hours  metoprolol 25milliGRAM(s) Oral two times a day  dextrose 5% + sodium chloride 0.45%. 1000milliLiter(s) IV Continuous <Continuous>    MEDICATIONS  (PRN):  haloperidol    Injectable 5milliGRAM(s) IntraMuscular every 6 hours PRN Agitation  acetaminophen   Tablet 650milliGRAM(s) Oral every 6 hours PRN For Temp greater than 38 C (100.4 F)      Vitals:  T(F): 99.4, Max: 99.4 (04-18 @ 11:47)  HR: 67 (67 - 86)  BP: 128/68 (128/68 - 182/108)  RR: 16 (16 - 18)  SpO2: 98% (94% - 98%)    I & Os for current day (as of 04-18 @ 15:02)  =============================================  IN:    dextrose 5% + sodium chloride 0.45%.: 900 ml    Oral Fluid: 280 ml    Total IN: 1180 ml  ---------------------------------------------  OUT:    Total OUT: 0 ml  ---------------------------------------------  Total NET: 1180 ml    PHYSICAL EXAM:  Neuro: Awake, confused anxious  CV: S1 S2 RRR  Lungs: diminished to bases  GI: Soft, BS +, ND, NT  Extremities: No edema    RADIOLOGY: CHEST SINGLE VIEW                        Pulmonary vascular congestion.  Increasing right lower and left upper lung opacities.    DIAGNOSTIC TESTING:     [x ] Echocardiogram: 04/14/2017    1. Left ventricular ejection fraction, by visual estimation, is 50 to   55%.   2. Mildly increased LV wall thickness.   3. Spectral Doppler shows pseudonormal pattern of left ventricular   myocardial filling (Grade II diastolic dysfunction).   4. There is mild concentric left ventricular hypertrophy.   5. Normal right ventricular size and function.   6. The left atrium is normal in size.   7. The right atrium is normal in size.   8. Thickening of the anterior mitral valve leaflet.   9. Structurally normal tricuspid valve, with normal leaflet excursion.  10. Mild aortic regurgitation.  11. Sclerotic aortic valve with normal opening.  12. Structurally normal pulmonic valve, with normal leaflet excursion.      LABS:	 	    18 Apr 2017 06:30    144    |  109    |  27     ----------------------------<  145    3.7     |  21     |  0.97   17 Apr 2017 06:34    147    |  115    |  33     ----------------------------<  96     4.2     |  21     |  0.92   16 Apr 2017 07:15    145    |  110    |  37     ----------------------------<  117    3.0     |  26     |  1.04     Ca    9.1        18 Apr 2017 06:30  Mg     2.1       17 Apr 2017 06:34      Lipid Profile: 04-17 @ 13:56  HDL/Total Cholesterol: 6.7 RATIO  HDL Chol:              25 mg/dL  Serum Chol:            168 mg/dL  Direct LDL:            111 mg/dL  Triglycerides:         160 mg/dL Patient is a 89y old  Male who presents with a chief complaint of mental status change. (11 Apr 2017 10:52)      PAST MEDICAL & SURGICAL HISTORY:  Dementia  No significant past surgical history      INTERVAL HISTORY: Resting in bed, not in any acute distress, lethargic, easily arousable, remains confused  	  MEDICATIONS:  MEDICATIONS  (STANDING):  aspirin enteric coated 81milliGRAM(s) Oral daily  heparin  Injectable 5000Unit(s) SubCutaneous every 12 hours  OLANZapine 5milliGRAM(s) Oral every 12 hours  metoprolol 25milliGRAM(s) Oral two times a day  dextrose 5% + sodium chloride 0.45%. 1000milliLiter(s) IV Continuous <Continuous>    MEDICATIONS  (PRN):  haloperidol    Injectable 5milliGRAM(s) IntraMuscular every 6 hours PRN Agitation  acetaminophen   Tablet 650milliGRAM(s) Oral every 6 hours PRN For Temp greater than 38 C (100.4 F)      Vitals:  T(F): 99.4, Max: 99.4 (04-18 @ 11:47)  HR: 67 (67 - 86)  BP: 128/68 (128/68 - 182/108)  RR: 16 (16 - 18)  SpO2: 98% (94% - 98%)    I & Os for current day (as of 04-18 @ 15:02)  =============================================  IN:    dextrose 5% + sodium chloride 0.45%.: 900 ml    Oral Fluid: 280 ml    Total IN: 1180 ml  ---------------------------------------------  OUT:    Total OUT: 0 ml  ---------------------------------------------  Total NET: 1180 ml    PHYSICAL EXAM:  Neuro: lethargic, easily arousable,  confused   CV: S1 S2 RRR  Lungs: diminished to bases  GI: Soft, BS +, ND, NT  Extremities: No edema    RADIOLOGY: CHEST SINGLE VIEW                        Pulmonary vascular congestion.  Increasing right lower and left upper lung opacities.    DIAGNOSTIC TESTING:     [x ] Echocardiogram: 04/14/2017    1. Left ventricular ejection fraction, by visual estimation, is 50 to   55%.   2. Mildly increased LV wall thickness.   3. Spectral Doppler shows pseudonormal pattern of left ventricular   myocardial filling (Grade II diastolic dysfunction).   4. There is mild concentric left ventricular hypertrophy.   5. Normal right ventricular size and function.   6. The left atrium is normal in size.   7. The right atrium is normal in size.   8. Thickening of the anterior mitral valve leaflet.   9. Structurally normal tricuspid valve, with normal leaflet excursion.  10. Mild aortic regurgitation.  11. Sclerotic aortic valve with normal opening.  12. Structurally normal pulmonic valve, with normal leaflet excursion.      LABS:	 	    18 Apr 2017 06:30    144    |  109    |  27     ----------------------------<  145    3.7     |  21     |  0.97   17 Apr 2017 06:34    147    |  115    |  33     ----------------------------<  96     4.2     |  21     |  0.92   16 Apr 2017 07:15    145    |  110    |  37     ----------------------------<  117    3.0     |  26     |  1.04     Ca    9.1        18 Apr 2017 06:30  Mg     2.1       17 Apr 2017 06:34      Lipid Profile: 04-17 @ 13:56  HDL/Total Cholesterol: 6.7 RATIO  HDL Chol:              25 mg/dL  Serum Chol:            168 mg/dL  Direct LDL:            111 mg/dL  Triglycerides:         160 mg/dL

## 2017-04-18 NOTE — PROGRESS NOTE ADULT - ASSESSMENT
89 y/o man with advanced dementia, agitation and unsteady gait, s/p probable mechanical fall. Has + troponin, Echo normal EF. Remains confused. Now with poor intake, palliative f/u, Calorie count. Rodgers catheter reinserted for urinary retention

## 2017-04-18 NOTE — GOALS OF CARE CONVERSATION - PERSONAL ADVANCE DIRECTIVE - TREATMENT GUIDELINE COMMENT
full code for now.  Will discus with next of kin.
Wife Sapna & son Kush returned good understanding of above, Wife signed MOLST for DNR/DNI, no Peg/Do Not re-hospitalize, comfort measures only, Hospice referral given to OSEAS Melgoza RN & faxed face sheet to [facility] [phone].

## 2017-04-18 NOTE — CHART NOTE - NSCHARTNOTEFT_GEN_A_CORE
Upon Nutritional Assessment by the Registered Dietitian your patient was determined to meet criteria / has evidence of the following diagnosis/diagnoses:          [X ]  Mild Protein Calorie Malnutrition        [ ]  Moderate Protein Calorie Malnutrition        [ ] Severe Protein Calorie Malnutrition        [ ] Unspecified Protein Calorie Malnutrition        [ ] Underweight / BMI <19        [ ] Morbid Obesity / BMI > 40      Findings as based on:  •  Comprehensive nutrition assessment and consultation  •  Calorie counts (nutrient intake analysis)  •  Food acceptance and intake status from observations by staff  •  Follow up  •  Patient education  •  Intervention secondary to interdisciplinary rounds  •   concerns      Treatment:    The following diet has been recommended:  Dysphagia 2 Mechanical soft with thin liquids c Ensure Enlive 8 oz 3x/day (1050 kcals, 60 gm pro)  Provide assistance & encouragement c all meals, encourage PO fluid intake    PROVIDER Section:     By signing this assessment you are acknowledging and agree with the diagnosis/diagnoses assigned by the Registered Dietitian    Comments:

## 2017-04-18 NOTE — PROGRESS NOTE ADULT - PROBLEM SELECTOR PLAN 5
-Resolved. Rodgers removed, tehna gain with acute U. retention this am  PSA wnl  Await Urology eval Dr. roland  -monitor urine output

## 2017-04-18 NOTE — GOALS OF CARE CONVERSATION - PERSONAL ADVANCE DIRECTIVE - WHAT MATTERS MOST
Wife informed me that she cannot take care of pt at home & wishes for him to go to SNF for LTC. Wife completed MOLST, & requested Hospice referral.  Referral to BRUNO Melgoza RN, and face sheet faxed to [facility] [phone].

## 2017-04-18 NOTE — DIETITIAN INITIAL EVALUATION ADULT. - PHYSICAL APPEARANCE
underweight/No edema. BMI 23.2. NFPE attempted, limited due to pt mental status did not comply c exam. Nutrition focused physical exam conducted ; found signs of malnutrition [ ]absent [x ]present.  Subcutaneous fat loss: [ ] Orbital fat pads region, [ ]Buccal fat region, [ ]Triceps region,  [ ]Ribs region.  Muscle wasting: [ ]Temples region, [X mild]Clavicle region, [ ]Shoulder region, [ ]Scapula region, [ ]Interosseous region,  [X mild ]thigh region, [ ]Calf region

## 2017-04-18 NOTE — PROGRESS NOTE ADULT - ASSESSMENT
89-year-old male admitted with agitation with advanced dementia, status post fall. Elevated troponins likely due to non-ST elevation MI either prior or after the fall.  Given his age, dementia and overall debility there is likely little point in pursuing any diagnostic testing or revascularization procedures. Patient appears hemodynamically stable and would continue to treat the patient conservatively. pt had urinary retention earlier later spence was d/c'd and again had U. retention , had FC placed this am, for RV of 959ml on bladder scan, await urology eval.

## 2017-04-18 NOTE — PROGRESS NOTE ADULT - SUBJECTIVE AND OBJECTIVE BOX
CHIEF COMPLAINT/INTERVAL HISTORY:    Patient is a 89y old  Male who presents with a chief complaint of mental status change. (11 Apr 2017 10:52)      HPI:  89 y/o Man withf dementia, b/l hip surgery brought in for worsening agitation accompanied by son. Son states for past 2-3 days his dementia/confusion has gotten worse to the point he is not making sense and unable to ambulate well. The son heard a thump upstairs and found pt on the floor attempting to get up; he had a mild head contusion, but no active bleeding. No history of seizures.  History is limited due to dementia, pt required sedation for agitation in ED. (11 Apr 2017 05:15)      SUBJECTIVE & OBJECTIVE: Pt seen and examined at bedside.     ICU Vital Signs Last 24 Hrs  T(C): 37.4, Max: 37.4 (04-18 @ 11:47)  T(F): 99.4, Max: 99.4 (04-18 @ 11:47)  HR: 67 (67 - 86)  BP: 128/68 (128/68 - 182/108)  BP(mean): --  ABP: --  ABP(mean): --  RR: 16 (16 - 18)  SpO2: 98% (94% - 98%)        MEDICATIONS  (STANDING):  aspirin enteric coated 81milliGRAM(s) Oral daily  heparin  Injectable 5000Unit(s) SubCutaneous every 12 hours  OLANZapine 5milliGRAM(s) Oral every 12 hours  metoprolol 25milliGRAM(s) Oral two times a day  dextrose 5% + sodium chloride 0.45%. 1000milliLiter(s) IV Continuous <Continuous>    MEDICATIONS  (PRN):  haloperidol    Injectable 5milliGRAM(s) IntraMuscular every 6 hours PRN Agitation  acetaminophen   Tablet 650milliGRAM(s) Oral every 6 hours PRN For Temp greater than 38 C (100.4 F)        PHYSICAL EXAM:    GENERAL: NAD, well-groomed, well-developed  HEAD:  Atraumatic, Normocephalic  EYES: EOMI, PERRLA, conjunctiva and sclera clear  ENMT: Moist mucous membranes  NECK: Supple, No JVD  NERVOUS SYSTEM:  Alert & Oriented X3, Motor Strength 5/5 B/L upper and lower extremities; DTRs 2+ intact and symmetric  CHEST/LUNG: Clear to auscultation bilaterally; No rales, rhonchi, wheezing, or rubs  HEART: Regular rate and rhythm; No murmurs, rubs, or gallops  ABDOMEN: Soft, Nontender, Nondistended; Bowel sounds present  EXTREMITIES:  2+ Peripheral Pulses, No clubbing, cyanosis, or edema    LABS:    04-18    144  |  109<H>  |  27<H>  ----------------------------<  145<H>  3.7   |  21<L>  |  0.97    Ca    9.1      18 Apr 2017 06:30  Mg     2.1     04-17 CHIEF COMPLAINT/INTERVAL HISTORY:    Patient is a 89y old  Male who presents with a chief complaint of mental status change. (11 Apr 2017 10:52)      HPI:  89 y/o Man withf dementia, b/l hip surgery brought in for worsening agitation accompanied by son. Son states for past 2-3 days his dementia/confusion has gotten worse to the point he is not making sense and unable to ambulate well. The son heard a thump upstairs and found pt on the floor attempting to get up; he had a mild head contusion, but no active bleeding. No history of seizures.  History is limited due to dementia, pt required sedation for agitation in ED. (11 Apr 2017 05:15)      SUBJECTIVE & OBJECTIVE: Pt seen and examined at bedside.     ICU Vital Signs Last 24 Hrs  T(C): 37.4, Max: 37.4 (04-18 @ 11:47)  T(F): 99.4, Max: 99.4 (04-18 @ 11:47)  HR: 67 (67 - 86)  BP: 128/68 (128/68 - 182/108)  BP(mean): --  ABP: --  ABP(mean): --  RR: 16 (16 - 18)  SpO2: 98% (94% - 98%)        MEDICATIONS  (STANDING):  aspirin enteric coated 81milliGRAM(s) Oral daily  heparin  Injectable 5000Unit(s) SubCutaneous every 12 hours  OLANZapine 5milliGRAM(s) Oral every 12 hours  metoprolol 25milliGRAM(s) Oral two times a day  dextrose 5% + sodium chloride 0.45%. 1000milliLiter(s) IV Continuous <Continuous>    MEDICATIONS  (PRN):  haloperidol    Injectable 5milliGRAM(s) IntraMuscular every 6 hours PRN Agitation  acetaminophen   Tablet 650milliGRAM(s) Oral every 6 hours PRN For Temp greater than 38 C (100.4 F)        PHYSICAL EXAM:      GENERAL: NAD, malnourished  HEAD:  Atraumatic, Normocephalic  EYES: EOMI, PERRLA, conjunctiva and sclera clear  ENMT: dry mucous membranes  NECK: Supple, No JVD  NERVOUS SYSTEM:  alert/awake/confused, moves all 4 extremities.  CHEST/LUNG: Clear to auscultation bilaterally; No rales, rhonchi, wheezing, or rubs  HEART: Regular rate and rhythm;   ABDOMEN: Soft, Nontender, Nondistended; Bowel sounds present  EXTREMITIES:  no cyanosis, or edema    LABS:      144  |  109<H>  |  27<H>  ----------------------------<  145<H>  3.7   |  21<L>  |  0.97    Ca    9.1      18 Apr 2017 06:30  Mg     2.1     04-17

## 2017-04-18 NOTE — DIETITIAN INITIAL EVALUATION ADULT. - OTHER INFO
89 yo M seen for consult 2 day [place] ct ordered, also due for LOS. Pt with dementia unable to obtain history. Observed pt's breakfast tray c a few spoonfuls of applesauce consumed, drank the OJ. Per PCA Syeda pt tolerated diet, does not eat well for her but reports pt eats better in the afternoon. 50% intake documented in flowsheets yesterday afternoon. Per PCA pt likes sweets & drinks liquids well. Requested diet office to send vanilla Ensure c lunch, will request MD order. Last BM this am. Per NP note 4/17- still full code, awaiting goals of care conversation, MD note 4/17- palliative follow up

## 2017-04-18 NOTE — CONSULT NOTE ADULT - SUBJECTIVE AND OBJECTIVE BOX
HPI:  89 y/o Man withf dementia, b/l hip surgery brought in for worsening agitation accompanied by son. Son states for past 2-3 days his dementia/confusion has gotten worse to the point he is not making sense and unable to ambulate well. The son heard a thump upstairs and found pt on the floor attempting to get up; he had a mild head contusion, but no active bleeding. No history of seizures.  History is limited due to dementia, pt required sedation for agitation in ED. (2017 05:15)    at the time of admission, Rodgers Catheter was inserted for urinary retention , subsequently removed. Post void residual more than 900 cc, Rodgers catheter was inserted, consult was sought.  Pt is confused , history obtained from the chart.      PAST MEDICAL & SURGICAL HISTORY:  Dementia    Bilateral hip surgery      Allergies    No Known Allergies            SOCIAL HISTORY; lives at home  FAMILY HISTORY:  No pertinent family history in first degree relatives      MEDICATIONS  (STANDING):  aspirin enteric coated 81milliGRAM(s) Oral daily  heparin  Injectable 5000Unit(s) SubCutaneous every 12 hours  OLANZapine 5milliGRAM(s) Oral every 12 hours  metoprolol 25milliGRAM(s) Oral two times a day  dextrose 5% + sodium chloride 0.45%. 1000milliLiter(s) IV Continuous <Continuous>    MEDICATIONS  (PRN):  haloperidol    Injectable 5milliGRAM(s) IntraMuscular every 6 hours PRN Agitation  acetaminophen   Tablet 650milliGRAM(s) Oral every 6 hours PRN For Temp greater than 38 C (100.4 F)      ROS:    no history could be obtained      Physical Exam:    Vital Signs:  Vital Signs Last 24 Hrs  T(C): 37.2, Max: 37.4 ( @ 11:47)  T(F): 99, Max: 99.4 (- @ 11:47)  HR: 81 (67 - 81)  BP: 124/82 (124/82 - 158/95)  BP(mean): --  RR: 16 (16 - 18)  SpO2: 94% (94% - 98%)  Daily     Daily Weight in k.3 (2017 09:13)  I&O's Summary  I & Os for 24h ending 2017 07:00  =============================================  IN: 1180 ml / OUT: 0 ml / NET: 1180 ml    I & Os for current day (as of 2017 20:01)  =============================================  IN: 260 ml / OUT: 1000 ml / NET: -740 ml      General:  Appears stated age,  well-nourished, no distress  HEENT:  NC/AT, patent nares w/ pink mucosa, OP moist and pink,  PERRL, EOMI, conjunctivae clear, no thyromegaly, nodules, adenopathy, no JVD  Chest:  Full & symmetric excursion, no increased effort.   Cardiovascular:  Regular rhythm, S1, S2,   Abdomen:  Soft, non-tender, non-distended, normoactive bowel sounds.  Genitalia: Circumcised Phallus, Adequate meatus, no hypospadias. Both testicles descended, non tender, no mass. No epididymitis or epididymal mass. No hydrocele. Rodgers Catheter draining clear urine  Rectal Examination: Deferred.  Extremities:  no edema, pedal pulsation are present, no calf tenderness.  Skin:  No rash/erythema/ecchymoses/petechiae/wounds/abscess/warm/dry  Musculoskeletal:  Full ROM in all joints w/o swelling/tenderness/effusion  Neuro/Psych:  Dementia confused  LABS:        144  |  109<H>  |  27<H>  ----------------------------<  145<H>  3.7   |  21<L>  |  0.97    Ca    9.1      2017 06:30  Mg     2.1                 RADIOLOGY & ADDITIONAL STUDIES:    EXAM:  US PELVIC LIMITED OR FOLLOW UP                            PROCEDURE DATE:  2017        INTERPRETATION:  CLINICAL INFORMATION: Urinary retention    COMPARISON: No prior exams are available for comparison.    TECHNIQUE:    Ultrasound evaluation of the urinary bladder.    FINDINGS:    The urinary bladder is morphologically normal. No free fluid is present   in the pelvis.    Before voiding, the bladder measures 13.9 x 10.9 x 10.9 cm, which   corresponds to an estimated volume of 1173 ml.    The patient was unable to follow instructions for voiding.    IMPRESSION:    Bladder volume 1173 ml.  Patient unable to follow instructions for voiding.              MARIA ESTHER CARO M.D., ATTENDING RADIOLOGIST

## 2017-04-18 NOTE — CONSULT NOTE ADULT - ASSESSMENT
consult dict events noted confused s/p fall ct head no acute path c spine no fx hx of hip surg  for tsh b12 for sub acute rehab supportive care .
89 yrs old male has dementia and on psychotropic drugs has chronic urinary retention, requiring Rodgers Catheter , will start him on Flomax and give TOV

## 2017-04-18 NOTE — DIETITIAN INITIAL EVALUATION ADULT. - NS AS NUTRI INTERV MEALS SNACK
Composition of meals/snacks/Liberalize diet to Dysphagia 2 [facility] soft c thin liquids. Provide support & encouragement c eating, encourage PO fluid intake/Other (specify)

## 2017-04-18 NOTE — PROGRESS NOTE ADULT - PROBLEM SELECTOR PLAN 4
c/w low dose beta blocker  could add another antihypertensive med if needed for better control of BP, echo pending, management of agitation

## 2017-04-18 NOTE — PROGRESS NOTE ADULT - PROBLEM SELECTOR PLAN 1
-medical mgmt with ASA, and B blocker  -cardiology follow up appreciated  -echo result noted  -not a candidate fro aggressive Rx given advanced age/dementia, family also requesting palliative care.

## 2017-04-18 NOTE — DIETITIAN INITIAL EVALUATION ADULT. - PERTINENT LABORATORY DATA
04-18 Na144 mmol/L Glu 145 mg/dL<H> K+ 3.7 mmol/L Cr  0.97 mg/dL BUN 27 mg/dL<H> Phos n/a   Alb n/a   PAB n/a.  4/17- Chol 168, TG's 160H, HDL 25L, Mg++ 2.1. 4/12- Vit B12 508

## 2017-04-18 NOTE — GOALS OF CARE CONVERSATION - PERSONAL ADVANCE DIRECTIVE - CONVERSATION/DISCUSSION
ordered by PMD/Treatment Options/MOLST Discussed/Hospice Referral/Prognosis/Palliative Care Referral

## 2017-04-18 NOTE — PROGRESS NOTE ADULT - PROBLEM SELECTOR PLAN 2
c/w ASA   hemodynamically stable and would continue to treat the patient conservatively.  Echo: normal EF, Grade II diastolic dysfunction,  Mild aortic regurgitation.  c/w low dose metoprolol

## 2017-04-18 NOTE — PROGRESS NOTE ADULT - ATTENDING COMMENTS
Left message with family to call back, awaiting callback from family.
Ciro: Pt assigned to my care at 2:30 pm on 4/13/17
Nutritional and chronic care management. Will sign off. Please call back if needed.
Ciro: Pt assigned to my care at 2:30 pm on 4/13/17

## 2017-04-19 LAB
ALBUMIN SERPL ELPH-MCNC: 2.5 G/DL — LOW (ref 3.3–5)
ALP SERPL-CCNC: 90 U/L — SIGNIFICANT CHANGE UP (ref 40–120)
ALT FLD-CCNC: 31 U/L — SIGNIFICANT CHANGE UP (ref 12–78)
ANION GAP SERPL CALC-SCNC: 8 MMOL/L — SIGNIFICANT CHANGE UP (ref 5–17)
AST SERPL-CCNC: 36 U/L — SIGNIFICANT CHANGE UP (ref 15–37)
BILIRUB SERPL-MCNC: 0.4 MG/DL — SIGNIFICANT CHANGE UP (ref 0.2–1.2)
BUN SERPL-MCNC: 29 MG/DL — HIGH (ref 7–23)
CALCIUM SERPL-MCNC: 8.5 MG/DL — SIGNIFICANT CHANGE UP (ref 8.5–10.1)
CHLORIDE SERPL-SCNC: 111 MMOL/L — HIGH (ref 96–108)
CO2 SERPL-SCNC: 26 MMOL/L — SIGNIFICANT CHANGE UP (ref 22–31)
CREAT SERPL-MCNC: 1.1 MG/DL — SIGNIFICANT CHANGE UP (ref 0.5–1.3)
GLUCOSE SERPL-MCNC: 148 MG/DL — HIGH (ref 70–99)
HCT VFR BLD CALC: 34.2 % — LOW (ref 39–50)
HGB BLD-MCNC: 11.6 G/DL — LOW (ref 13–17)
MCHC RBC-ENTMCNC: 29.7 PG — SIGNIFICANT CHANGE UP (ref 27–34)
MCHC RBC-ENTMCNC: 33.8 GM/DL — SIGNIFICANT CHANGE UP (ref 32–36)
MCV RBC AUTO: 87.9 FL — SIGNIFICANT CHANGE UP (ref 80–100)
PLATELET # BLD AUTO: 298 K/UL — SIGNIFICANT CHANGE UP (ref 150–400)
POTASSIUM SERPL-MCNC: 3.5 MMOL/L — SIGNIFICANT CHANGE UP (ref 3.5–5.3)
POTASSIUM SERPL-SCNC: 3.5 MMOL/L — SIGNIFICANT CHANGE UP (ref 3.5–5.3)
PROT SERPL-MCNC: 6 GM/DL — SIGNIFICANT CHANGE UP (ref 6–8.3)
RBC # BLD: 3.89 M/UL — LOW (ref 4.2–5.8)
RBC # FLD: 13.7 % — SIGNIFICANT CHANGE UP (ref 11–15)
SODIUM SERPL-SCNC: 145 MMOL/L — SIGNIFICANT CHANGE UP (ref 135–145)
WBC # BLD: 11.8 K/UL — HIGH (ref 3.8–10.5)
WBC # FLD AUTO: 11.8 K/UL — HIGH (ref 3.8–10.5)

## 2017-04-19 PROCEDURE — 99233 SBSQ HOSP IP/OBS HIGH 50: CPT

## 2017-04-19 RX ADMIN — OLANZAPINE 5 MILLIGRAM(S): 15 TABLET, FILM COATED ORAL at 05:30

## 2017-04-19 RX ADMIN — TAMSULOSIN HYDROCHLORIDE 0.4 MILLIGRAM(S): 0.4 CAPSULE ORAL at 21:21

## 2017-04-19 RX ADMIN — SODIUM CHLORIDE 50 MILLILITER(S): 9 INJECTION, SOLUTION INTRAVENOUS at 01:57

## 2017-04-19 RX ADMIN — HALOPERIDOL DECANOATE 5 MILLIGRAM(S): 100 INJECTION INTRAMUSCULAR at 21:21

## 2017-04-19 RX ADMIN — Medication 25 MILLIGRAM(S): at 05:30

## 2017-04-19 RX ADMIN — SODIUM CHLORIDE 50 MILLILITER(S): 9 INJECTION, SOLUTION INTRAVENOUS at 05:13

## 2017-04-19 RX ADMIN — HEPARIN SODIUM 5000 UNIT(S): 5000 INJECTION INTRAVENOUS; SUBCUTANEOUS at 05:30

## 2017-04-19 RX ADMIN — HEPARIN SODIUM 5000 UNIT(S): 5000 INJECTION INTRAVENOUS; SUBCUTANEOUS at 17:30

## 2017-04-19 RX ADMIN — HALOPERIDOL DECANOATE 5 MILLIGRAM(S): 100 INJECTION INTRAMUSCULAR at 01:53

## 2017-04-19 RX ADMIN — Medication 81 MILLIGRAM(S): at 12:49

## 2017-04-19 RX ADMIN — HALOPERIDOL DECANOATE 5 MILLIGRAM(S): 100 INJECTION INTRAMUSCULAR at 14:01

## 2017-04-19 RX ADMIN — Medication 1 MILLIGRAM(S): at 15:10

## 2017-04-19 RX ADMIN — Medication 25 MILLIGRAM(S): at 17:30

## 2017-04-19 RX ADMIN — OLANZAPINE 5 MILLIGRAM(S): 15 TABLET, FILM COATED ORAL at 17:30

## 2017-04-19 NOTE — PROGRESS NOTE ADULT - PROBLEM SELECTOR PROBLEM 1
Dementia with behavioral disturbance, unspecified dementia type
NSTEMI (non-ST elevated myocardial infarction)

## 2017-04-19 NOTE — PROGRESS NOTE ADULT - PROBLEM SELECTOR PLAN 4
-no acute fracture; CT head no bleed or fracture  -PT recs for subacute rehab, but now patient on palliative care, await hospice eval, as per discussion with PT pt. unable to participate in PT,

## 2017-04-19 NOTE — PROGRESS NOTE ADULT - SUBJECTIVE AND OBJECTIVE BOX
Initial Consultaion Note  Requested by Name:  Date/ Time:  Reason for referral/ Consultation:    HPI:  89 y/o Man withf dementia, b/l hip surgery brought in for worsening agitation accompanied by son. Son states for past 2-3 days his dementia/confusion has gotten worse to the point he is not making sense and unable to ambulate well. The son heard a thump upstairs and found pt on the floor attempting to get up; he had a mild head contusion, but no active bleeding. No history of seizures.  History is limited due to dementia, pt required sedation for agitation in ED. (11 Apr 2017 05:15)      PAST MEDICAL & SURGICAL HISTORY:  Dementia  No significant past surgical history      SOCIAL HISTORY:                                 Admitted from: home [ ] SNF [ ] [facility] [ ] AL [ ]    )    ADVANCE DIRECTIVES:  [ ] YES [ ] NO     DNR [ ] YES [ ] NO  Completed on:                       MOLST  [ ] YES [ ] NO   Completed on  :  Living Will  [ ] YES [ ] NO   Completed on:    Allergies    No Known Allergies    Intolerances        Review of Systems:     PAIN : (Y/N) (0-10)          DYSPNEA:     NAUSEA/VOMITING:   DEPRESSION:        SECRETIONS:(    FRAILTY SYNDROM       FAILURE TO THRIVE     DIBILITY   OTHER SYMPTOMS :    UNABLE TO OBTAIN DUE TO POOR MENTATION (   )    PHYSICAL EXAM:  T(F): 98.9, Max: 99 (04-18 @ 17:16)  HR: 79 (77 - 84)  BP: 135/89 (124/82 - 147/87)  RR: 18 (16 - 18)  SpO2: 97% (94% - 99%)  Wt(kg): --   WEIGHT :    [id number]                  BMI:  I&O's Summary    I & Os for current day (as of 19 Apr 2017 17:04)  =============================================  IN: 860 ml / OUT: 1550 ml / NET: -690 ml    CAPILLARY BLOOD GLUCOSE      GENERAL: alert [ ] oriented x ______[ ] lethargic        [ ] cachexia     [ ] nonverbal [ ] coma [ ]    HEENT: normal [ ] dry mouth [ ] ET tube/trach [ ]   LUNGS: ]  CV :  normal [ ]  GI : normal [ ]  PEG /NG tube [ ]   : normal [ ] incontinent [ ] oliguria/anuria [ ] spence [ ]  MSK : normal [ ] weakness [ ] edema [ ]              ambulatory [ ] bebbound/wheelchair bound [ ]   SKIN : normal [ ] pressure ulcer (Y/N) Stage ______________ rash (Y/N)    Functional Assessment:Karnofsky Performance Score :  Palliative Performance Status Version 2:         %    LABS:                        11.6   11.8  )-----------( 298      ( 19 Apr 2017 10:47 )             34.2     04-19    145  |  111<H>  |  29<H>  ----------------------------<  148<H>  3.5   |  26  |  1.10    Ca    8.5      19 Apr 2017 10:47    TPro  6.0  /  Alb  2.5<L>  /  TBili  0.4  /  DBili  x   /  AST  36  /  ALT  31  /  AlkPhos  90  04-19          I&O's Detail    I & Os for current day (as of 19 Apr 2017 17:04)  =============================================  IN:    dextrose 5% + sodium chloride 0.45%.: 600 ml    Oral Fluid: 260 ml    Total IN: 860 ml  ---------------------------------------------  OUT:    Ureteral Catheter: 1550 ml    Total OUT: 1550 ml  ---------------------------------------------  Total NET: -690 ml      Assessment:   89y Male admitted with NON-ST-ELEVATION MYOCARDIAL INFARCTION, AGITATION  FELL LACERATION TO BACK OF HEAD      PROBLEM LIST :  PROBLEM/RECOMMENDATION: 1  Problem : Goals of care, counseling/ discussion.  Recommendation: met with patient/ family and discussed GOC & Advanced care planning                                    Palliative care info/counseling provided (Y/N)                                      Family meeting                                   Advanced Directives addressed (Y/N)  PROBLEM/ RECOMMENDATION:2  Problem :Resuscitation/ DNR/ DNI,   Recommendation: DNR/ DNI    PROBLEM/ RECOMMENDATION :3  Problem : Medical order for life sustaning treatment  Recommendation : MOLST Form ( initiated/ completed)    PROBLEM/RECOMMENDATION :4  Problem : Advanced care planning  Recommendation : Family meeting.(Y/N)     PLAN:  REFFERALS:                           Unit SW/Case Mgmt (Y/N)                          (Y/N)                         Speech/Swallow (Y/N)                           nutrition                                              Ethics (Y/N)                         PT/OT (Y/N)

## 2017-04-19 NOTE — PROGRESS NOTE ADULT - SUBJECTIVE AND OBJECTIVE BOX
CHIEF COMPLAINT/INTERVAL HISTORY:    Patient is a 89y old  Male who presents with a chief complaint of mental status change. (11 Apr 2017 10:52)      HPI:  89 y/o Man withf dementia, b/l hip surgery brought in for worsening agitation accompanied by son. Son states for past 2-3 days his dementia/confusion has gotten worse to the point he is not making sense and unable to ambulate well. The son heard a thump upstairs and found pt on the floor attempting to get up; he had a mild head contusion, but no active bleeding. No history of seizures.  History is limited due to dementia, pt required sedation for agitation in ED. (11 Apr 2017 05:15)      SUBJECTIVE & OBJECTIVE: Pt seen and examined at bedside.   off and on agitation,   Poor oral intake, confused  unable to provide ros     Vital Signs Last 24 Hrs  T(C): 37.2, Max: 37.2 (04-18 @ 17:16)  T(F): 98.9, Max: 99 (04-18 @ 17:16)  HR: 79 (77 - 84)  BP: 135/89 (124/82 - 147/87)  BP(mean): --  ABP: --  ABP(mean): --  RR: 18 (16 - 18)  SpO2: 97% (94% - 99%)        MEDICATIONS  (STANDING):  aspirin enteric coated 81milliGRAM(s) Oral daily  heparin  Injectable 5000Unit(s) SubCutaneous every 12 hours  OLANZapine 5milliGRAM(s) Oral every 12 hours  metoprolol 25milliGRAM(s) Oral two times a day  dextrose 5% + sodium chloride 0.45%. 1000milliLiter(s) IV Continuous <Continuous>  tamsulosin 0.4milliGRAM(s) Oral at bedtime    MEDICATIONS  (PRN):  haloperidol    Injectable 5milliGRAM(s) IntraMuscular every 6 hours PRN Agitation  acetaminophen   Tablet 650milliGRAM(s) Oral every 6 hours PRN For Temp greater than 38 C (100.4 F)        PHYSICAL EXAM:    GENERAL: NAD, malnourished  HEAD:  Atraumatic, Normocephalic  EYES: EOMI, PERRLA, conjunctiva and sclera clear, + pallor  ENMT: dry mucous membranes  NECK: Supple, No JVD  NERVOUS SYSTEM:  alert/awake/confused, moves all 4 extremities.  CHEST/LUNG: Clear to auscultation bilaterally; No rales, rhonchi, wheezing, or rubs  HEART: Regular rate and rhythm;   ABDOMEN: Soft, Nontender, Nondistended; Bowel sounds present  EXTREMITIES:  no cyanosis, or edema      LABS:                        11.6   11.8  )-----------( 298      ( 19 Apr 2017 10:47 )             34.2     04-19    145  |  111<H>  |  29<H>  ----------------------------<  148<H>  3.5   |  26  |  1.10    Ca    8.5      19 Apr 2017 10:47    TPro  6.0  /  Alb  2.5<L>  /  TBili  0.4  /  DBili  x   /  AST  36  /  ALT  31  /  AlkPhos  90  04-19

## 2017-04-19 NOTE — PROGRESS NOTE ADULT - PROBLEM SELECTOR PLAN 1
-medical mgmt with ASA, and B blocker  -cardiology follow up appreciated  -echo result noted  -not a candidate fro aggressive Rx given advanced age/dementia, family also requesting palliative care/comfort care.

## 2017-04-19 NOTE — PROGRESS NOTE ADULT - PROBLEM SELECTOR PLAN 7
with Poor oral intake, assist with feeding, RD consult, calorie count, palliative care follow up.  family wishes for palliative care. For palliative meeting today
with Poor oral intake, assist with feeding, patient for comfort care,   await hospice eval. (Hospice notified)
with Poor oral intake, assist with feeding, RD consult, calorie count, palliative care follow up.

## 2017-04-19 NOTE — PROGRESS NOTE ADULT - NSHPATTENDINGPLANDISCUSS_GEN_ALL_CORE
RN
Internist
eloisa Currie
patient son Kush at length.
RN and PCT at bedside
RN on duty
[facility] jim Alejo / [phone]) to call back.
RN at bedside

## 2017-04-19 NOTE — PROGRESS NOTE ADULT - PROBLEM SELECTOR PLAN 6
encourage PO fluids
encourage PO fluids
gentle hydration   - repeat labs for AM
gentle hydration   - repeat labs for AM

## 2017-04-19 NOTE — PROGRESS NOTE ADULT - PROBLEM SELECTOR PROBLEM 2
NSTEMI (non-ST elevated myocardial infarction)
Hypokalemia
NSTEMI (non-ST elevated myocardial infarction)

## 2017-04-19 NOTE — PROGRESS NOTE ADULT - ASSESSMENT
89-year-old male with advanced dementia, p/w agitation and status post fall. found to have elevated troponins likely due to non-ST elevation MI either prior or after the fall.  As per cardio given his age, dementia and overall debility there is likely little point in pursuing any diagnostic testing or revascularization procedures. Patient appears hemodynamically stable and  conservatively treatment. Pt had urinary retention earlier later spence was d/c'd and again had U. retention , s/p FC reinsertion for RV of 959ml on bladder scan, s/p urology eval, started on flomax and pt to be d/c'd home with FC, after palliative discussion, family completed MOLST form and request only palliative/comfort care, await hospice evaluation.

## 2017-04-19 NOTE — PROGRESS NOTE ADULT - PROBLEM SELECTOR PROBLEM 3
Fall at home, initial encounter
Dementia with behavioral disturbance, unspecified dementia type
Fall at home, initial encounter

## 2017-04-19 NOTE — PROGRESS NOTE ADULT - SUBJECTIVE AND OBJECTIVE BOX
Patient seen and examined bedside resting comfortably.       T(F): 98.2, Max: 99.4 (04-18 @ 11:47)  HR: 77 (67 - 84)  BP: 130/92 (124/82 - 147/87)  RR: 17 (16 - 18)  SpO2: 96% (94% - 99%)        PHYSICAL EXAM:  General: NAD, WDWN  Neuro: Lethargic. Arousable. Confused.  Abdomen: soft, NTND. Normactive BS  Rodgers Catheter in place draining clear yellow urine.    LABS:    04-18    144  |  109<H>  |  27<H>  ----------------------------<  145<H>  3.7   |  21<L>  |  0.97    Ca    9.1      18 Apr 2017 06:30        I&O's Detail    I & Os for current day (as of 19 Apr 2017 09:07)  =============================================  IN:    dextrose 5% + sodium chloride 0.45%.: 600 ml    Oral Fluid: 260 ml    Total IN: 860 ml  ---------------------------------------------  OUT:    Ureteral Catheter: 1550 ml    Total OUT: 1550 ml  ---------------------------------------------  Total NET: -690 ml      Impression: 89y Male admitted with NON-ST-ELEVATION MYOCARDIAL INFARCTION, AGITATION  FELL LACERATION TO BACK OF HEAD  urinary retntion  elevated BUN/CR - resolved after insertion of Rodgers      Plan:  Pt was discussed with Dr. Laura, via phone.   He said to leave Rodgers in place. He will probably need to be discharged with catheter in.  Continue medical care.

## 2017-04-20 VITALS
SYSTOLIC BLOOD PRESSURE: 146 MMHG | TEMPERATURE: 99 F | DIASTOLIC BLOOD PRESSURE: 87 MMHG | OXYGEN SATURATION: 96 % | RESPIRATION RATE: 16 BRPM | HEART RATE: 82 BPM

## 2017-04-20 PROCEDURE — 99239 HOSP IP/OBS DSCHRG MGMT >30: CPT

## 2017-04-20 RX ORDER — HALOPERIDOL DECANOATE 100 MG/ML
1 INJECTION INTRAMUSCULAR
Qty: 0 | Refills: 0 | Status: DISCONTINUED | OUTPATIENT
Start: 2017-04-20 | End: 2017-04-20

## 2017-04-20 RX ORDER — TAMSULOSIN HYDROCHLORIDE 0.4 MG/1
1 CAPSULE ORAL
Qty: 0 | Refills: 0 | COMMUNITY
Start: 2017-04-20

## 2017-04-20 RX ORDER — OLANZAPINE 15 MG/1
1 TABLET, FILM COATED ORAL
Qty: 0 | Refills: 0 | COMMUNITY
Start: 2017-04-20

## 2017-04-20 RX ORDER — ASPIRIN/CALCIUM CARB/MAGNESIUM 324 MG
1 TABLET ORAL
Qty: 0 | Refills: 0 | COMMUNITY
Start: 2017-04-20

## 2017-04-20 RX ORDER — ACETAMINOPHEN 500 MG
2 TABLET ORAL
Qty: 0 | Refills: 0 | COMMUNITY
Start: 2017-04-20

## 2017-04-20 RX ORDER — METOPROLOL TARTRATE 50 MG
1 TABLET ORAL
Qty: 0 | Refills: 0 | COMMUNITY
Start: 2017-04-20

## 2017-04-20 RX ORDER — HALOPERIDOL DECANOATE 100 MG/ML
1 INJECTION INTRAMUSCULAR
Qty: 0 | Refills: 0 | COMMUNITY
Start: 2017-04-20

## 2017-04-20 RX ORDER — ATORVASTATIN CALCIUM 80 MG/1
1 TABLET, FILM COATED ORAL
Qty: 0 | Refills: 0 | COMMUNITY

## 2017-04-20 RX ADMIN — Medication 25 MILLIGRAM(S): at 05:26

## 2017-04-20 RX ADMIN — HEPARIN SODIUM 5000 UNIT(S): 5000 INJECTION INTRAVENOUS; SUBCUTANEOUS at 05:26

## 2017-04-20 RX ADMIN — Medication 81 MILLIGRAM(S): at 12:05

## 2017-04-20 RX ADMIN — OLANZAPINE 5 MILLIGRAM(S): 15 TABLET, FILM COATED ORAL at 05:26

## 2017-04-20 NOTE — DISCHARGE NOTE ADULT - CONDITIONS AT DISCHARGE
Patient is stable: tolerating diet, FC intact  75 minutes spent in direct patient care including physical examination, discharge instructions , medication instructions and follow up, family verbalized understanding and agreed.

## 2017-04-20 NOTE — DISCHARGE NOTE ADULT - PATIENT PORTAL LINK FT
“You can access the [facility] Patient Portal, offered by [facility], by registering with the following website: [email or web address]”

## 2017-04-20 NOTE — DISCHARGE NOTE ADULT - CARE PROVIDER_API CALL
PCP,   follow with PCp at [facility]  Phone: (   )    -  Fax: (   )    -    Omar Laura), Urology  [address] [place] [id number]  Phone: [phone]  Fax: [phone]

## 2017-04-20 NOTE — PROGRESS NOTE ADULT - PROVIDER SPECIALTY LIST ADULT
Cardiology
Cardiology
Hospitalist
Internal Medicine
Neurology
Palliative Care
Palliative Care
Urology
Urology
Cardiology

## 2017-04-20 NOTE — PROGRESS NOTE ADULT - SUBJECTIVE AND OBJECTIVE BOX
Patient seen and examined bedside resting comfortably. Confused  Voiding spontaenously without difficulty.    T(F): 98, Max: 98.9 (04-19 @ 13:34)  HR: 78 (65 - 92)  BP: 159/98 (135/89 - 165/89)  RR: 18 (16 - 18)  SpO2: 97% (95% - 98%)        PHYSICAL EXAM:  General: NAD, WDWN  Neuro:  confused  Abdomen: soft, NTND. Normactive BS  :  Spence in place. Draining clear urine.     LABS:                        11.6   11.8  )-----------( 298      ( 19 Apr 2017 10:47 )             34.2   04-19    145  |  111<H>  |  29<H>  ----------------------------<  148<H>  3.5   |  26  |  1.10    Ca    8.5      19 Apr 2017 10:47    TPro  6.0  /  Alb  2.5<L>  /  TBili  0.4  /  DBili  x   /  AST  36  /  ALT  31  /  AlkPhos  90  04-19    I&O's Detail    I & Os for current day (as of 20 Apr 2017 11:15)  =============================================  IN:    dextrose 5% + sodium chloride 0.45%.: 1100 ml    Oral Fluid: 400 ml    Total IN: 1500 ml  ---------------------------------------------  OUT:    Ureteral Catheter: 1100 ml    Total OUT: 1100 ml  ---------------------------------------------  Total NET: 400 ml      Impression: 89y Male admitted with NON-ST-ELEVATION MYOCARDIAL INFARCTION, AGITATION  -urinary retention    [facility]   Dementia      Plan:  -continue spence catheter and urine output monitoring.  Per Dr. Laura, pt can be discharged with catheter.  -continue VTE prophylaxis  -Have discussed with Dr Laura.

## 2017-04-20 NOTE — DISCHARGE NOTE ADULT - CARE PLAN
Principal Discharge DX:	NSTEMI (non-ST elevated myocardial infarction)  Goal:	comfort care  Instructions for follow-up, activity and diet:	take medications as directed  Not a candidate for aggressive measures, and family also request comfort care  Secondary Diagnosis:	Acute urinary retention  Instructions for follow-up, activity and diet:	continue with Rodgers catheter , give voiding trial at SNF after few days.  Flomax daily  Secondary Diagnosis:	Dementia with behavioral disturbance, unspecified dementia type  Instructions for follow-up, activity and diet:	supportive/comfort care  Secondary Diagnosis:	Fall at home, initial encounter  Instructions for follow-up, activity and diet:	ruled out any intracranial injury  Secondary Diagnosis:	Essential hypertension  Instructions for follow-up, activity and diet:	take medications as directed

## 2017-04-20 NOTE — DISCHARGE NOTE ADULT - SECONDARY DIAGNOSIS.
Acute urinary retention Dementia with behavioral disturbance, unspecified dementia type Fall at home, initial encounter Essential hypertension

## 2017-04-20 NOTE — DISCHARGE NOTE ADULT - PROVIDER TOKENS
FREE:[LAST:[PCP],PHONE:[(   )    -],FAX:[(   )    -],ADDRESS:[follow with PCp at [facility]]],TOKEN:'[id number]'

## 2017-04-20 NOTE — DISCHARGE NOTE ADULT - PLAN OF CARE
comfort care take medications as directed  Not a candidate for aggressive measures, and family also request comfort care continue with Rodgers catheter , give voiding trial at SNF after few days.  Flomax daily supportive/comfort care ruled out any intracranial injury take medications as directed

## 2017-04-20 NOTE — DISCHARGE NOTE ADULT - MEDICATION SUMMARY - MEDICATIONS TO TAKE
I will START or STAY ON the medications listed below when I get home from the hospital:    aspirin 81 mg oral delayed release tablet  -- 1 tab(s) by mouth once a day  -- Indication: For NSTEMI (non-ST elevated myocardial infarction)    acetaminophen 325 mg oral tablet  -- 2 tab(s) by mouth every 6 hours, As needed, For Temp greater than 38 C (100.4 F)  -- Indication: For Fever    enalapril 5 mg oral tablet  -- 1 tab(s) by mouth once a day  -- Indication: For HTN (hypertension)    tamsulosin 0.4 mg oral capsule  -- 1 cap(s) by mouth once a day (at bedtime)  -- Indication: For bph    Lipitor 10 mg oral tablet  -- 1 tab(s) by mouth once a day  -- Indication: For NON-ST-ELEVATION MYOCARDIAL INFARCTION, AGITATION    haloperidol 1 mg oral tablet  -- 1 tab(s) by mouth 2 times a day as needed for agitation  -- Indication: For Dementia with behavioral disturbance, unspecified dementia type    OLANZapine 5 mg oral tablet  -- 1 tab(s) by mouth every 12 hours  -- Indication: For Dementia with behavioral disturbance, unspecified dementia type    metoprolol tartrate 25 mg oral tablet  -- 1 tab(s) by mouth 2 times a day  -- Indication: For HTN (hypertension)

## 2017-04-20 NOTE — DISCHARGE NOTE ADULT - HOSPITAL COURSE
89-year-old male with advanced dementia, p/w agitation and status post fall. found to have elevated troponins likely due to non-ST elevation MI either prior or after the fall.  As per cardio given his age, dementia and overall debility there is likely little point in pursuing any diagnostic testing or revascularization procedures. Patient appears hemodynamically stable and  conservatively treated. Pt had urinary retention earlier later spence was d/c'd and again had U. retention , s/p FC reinsertion for RV of 959ml on bladder scan, s/p urology eval, started on flomax and pt to be d/c'd home with FC, and TOV to be given few days after taking flomax,  palliative care consulted and after discussion, family completed MOLST form and request only palliative/comfort care, dnr/dni/comfort care.

## 2017-04-25 DIAGNOSIS — Z51.5 ENCOUNTER FOR PALLIATIVE CARE: ICD-10-CM

## 2017-04-25 DIAGNOSIS — Z66 DO NOT RESUSCITATE: ICD-10-CM

## 2017-04-25 DIAGNOSIS — R45.1 RESTLESSNESS AND AGITATION: ICD-10-CM

## 2017-04-25 DIAGNOSIS — E78.5 HYPERLIPIDEMIA, UNSPECIFIED: ICD-10-CM

## 2017-04-25 DIAGNOSIS — I21.4 NON-ST ELEVATION (NSTEMI) MYOCARDIAL INFARCTION: ICD-10-CM

## 2017-04-25 DIAGNOSIS — I08.1 RHEUMATIC DISORDERS OF BOTH MITRAL AND TRICUSPID VALVES: ICD-10-CM

## 2017-04-25 DIAGNOSIS — I44.0 ATRIOVENTRICULAR BLOCK, FIRST DEGREE: ICD-10-CM

## 2017-04-25 DIAGNOSIS — Z96.649 PRESENCE OF UNSPECIFIED ARTIFICIAL HIP JOINT: ICD-10-CM
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
## 2017-04-25 DIAGNOSIS — F03.91 UNSPECIFIED DEMENTIA WITH BEHAVIORAL DISTURBANCE: ICD-10-CM

## 2017-04-25 DIAGNOSIS — E87.6 HYPOKALEMIA: ICD-10-CM
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
## 2017-04-25 DIAGNOSIS — I11.0 HYPERTENSIVE HEART DISEASE WITH HEART FAILURE: ICD-10-CM

## 2017-04-25 DIAGNOSIS — R13.10 DYSPHAGIA, UNSPECIFIED: ICD-10-CM

## 2017-04-25 DIAGNOSIS — I35.0 NONRHEUMATIC AORTIC (VALVE) STENOSIS: ICD-10-CM
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
## 2017-04-25 DIAGNOSIS — I27.2 OTHER SECONDARY PULMONARY HYPERTENSION: ICD-10-CM

## 2017-04-25 DIAGNOSIS — Z87.891 PERSONAL HISTORY OF NICOTINE DEPENDENCE: ICD-10-CM

## 2017-04-25 DIAGNOSIS — E44.1 MILD PROTEIN-CALORIE MALNUTRITION: ICD-10-CM

## 2017-04-25 DIAGNOSIS — N40.0 BENIGN PROSTATIC HYPERPLASIA WITHOUT LOWER URINARY TRACT SYMPTOMS: ICD-10-CM

## 2017-04-25 DIAGNOSIS — H91.93 UNSPECIFIED HEARING LOSS, BILATERAL: ICD-10-CM

## 2017-04-25 DIAGNOSIS — R33.9 RETENTION OF URINE, UNSPECIFIED: ICD-10-CM

## 2017-04-25 DIAGNOSIS — E86.0 DEHYDRATION: ICD-10-CM

## 2017-04-25 DIAGNOSIS — I50.32 CHRONIC DIASTOLIC (CONGESTIVE) HEART FAILURE: ICD-10-CM

## 2017-04-25 DIAGNOSIS — Z79.82 LONG TERM (CURRENT) USE OF ASPIRIN: ICD-10-CM

## 2017-04-25 DIAGNOSIS — S01.01XA LACERATION WITHOUT FOREIGN BODY OF SCALP, INITIAL ENCOUNTER: ICD-10-CM

## 2017-04-25 DIAGNOSIS — R01.1 CARDIAC MURMUR, UNSPECIFIED: ICD-10-CM

## 2017-04-25 DIAGNOSIS — R26.81 UNSTEADINESS ON FEET: ICD-10-CM

## 2017-05-04 ENCOUNTER — APPOINTMENT (OUTPATIENT)
Dept: CARDIOLOGY | Facility: CLINIC | Age: 82
End: 2017-05-04

## 2017-10-31 NOTE — PATIENT PROFILE ADULT. - HEALTHCARE QUESTIONS, PROFILE
CALLED DR. PIZARRO. INFORMED HIM OF DR. ROBBINS NURSING MESSAGE REGARDING
CANCELING PTS SURGERY DUE TO PT HAVING INVOLUNTARY MOVEMENTS. DR. PIZARRO STATES
HE IS TOO BUSY THURSDAY SO IT WILL HAVE TO BE POSSIBLY TUESDAY NEXT WEEK. none

## 2018-05-10 NOTE — ED ADULT NURSE NOTE - NS ED NOTE ABUSE SUSPICION NEGLECT YN
Progress Note





- Progress Note


Date of Service: 05/10/18


Note: 





Time spent on discharge 45 minutes. unable to assess

## 2019-11-12 NOTE — PHYSICAL THERAPY INITIAL EVALUATION ADULT - PHYSICAL ASSIST/NONPHYSICAL ASSIST, REHAB EVAL
Refill Routing Note    Medication(s) are appropriate for refill Outside of protocol      Requested Prescriptions   Pending Prescriptions Disp Refills    orphenadrine (NORFLEX) 100 mg tablet [Pharmacy Med Name: ORPHENADRINE  MG TABLET] 60 tablet 8     Sig: TAKE 1 TABLET BY MOUTH TWICE DAILY AS NEEDED       There is no refill protocol information for this order               2 person assist

## 2020-02-01 NOTE — GOALS OF CARE CONVERSATION - PERSONAL ADVANCE DIRECTIVE - CONVERSATION DETAILS
89 y/o Man with dementia, b/l hip surgery brought in for worsening agitation accompanied by son. Son states for past 2-3 days his dementia/confusion has gotten worse to the point he is not making sense and unable to ambulate well. The son heard a thump upstairs and found pt on the floor attempting to get up; he had a mild head contusion. Called  and left message to call back. for goals of care and advanced care planning conversation.
01-Feb-2020 12:17
Met with wife Sapna, & son Kush today 4/18/17.  They had just spoken to PMD Dr Blood, & wish to discuss GOC, & Advance Care Planning.  Per wife's wishes a MOLST was completed for DNR/DNI/no Peg/ Hospice eval.

## 2020-02-04 NOTE — PHYSICAL THERAPY INITIAL EVALUATION ADULT - LEVEL OF INDEPENDENCE: STAIR NEGOTIATION, REHAB EVAL
unable to perform Finasteride Pregnancy And Lactation Text: This medication is absolutely contraindicated during pregnancy. It is unknown if it is excreted in breast milk.

## 2022-03-11 NOTE — H&P ADULT. - OTHER CARE PROVIDERS
Dr Oh (cardiology)
[facility]  [id number] [place] [address]  Phone: [phone]  Fax: ()-  Follow Up Time: 1-3 Days
